# Patient Record
Sex: FEMALE | Race: WHITE | NOT HISPANIC OR LATINO | Employment: OTHER | URBAN - NONMETROPOLITAN AREA
[De-identification: names, ages, dates, MRNs, and addresses within clinical notes are randomized per-mention and may not be internally consistent; named-entity substitution may affect disease eponyms.]

---

## 2018-02-02 ENCOUNTER — DOCUMENTATION ONLY (OUTPATIENT)
Dept: FAMILY MEDICINE | Facility: OTHER | Age: 33
End: 2018-02-02

## 2018-02-02 RX ORDER — LORATADINE 10 MG/1
10 TABLET ORAL DAILY PRN
Status: ON HOLD | COMMUNITY
Start: 2014-10-14 | End: 2022-11-28

## 2018-02-02 RX ORDER — ALPRAZOLAM 0.5 MG
1 TABLET ORAL 2 TIMES DAILY PRN
COMMUNITY
Start: 2016-12-20 | End: 2018-04-06

## 2018-02-02 RX ORDER — BUPROPION HYDROCHLORIDE 150 MG/1
150 TABLET ORAL EVERY MORNING
COMMUNITY
Start: 2016-09-06 | End: 2018-04-06

## 2018-03-25 ENCOUNTER — HEALTH MAINTENANCE LETTER (OUTPATIENT)
Age: 33
End: 2018-03-25

## 2018-04-06 ENCOUNTER — OFFICE VISIT (OUTPATIENT)
Dept: FAMILY MEDICINE | Facility: OTHER | Age: 33
End: 2018-04-06
Attending: NURSE PRACTITIONER
Payer: COMMERCIAL

## 2018-04-06 VITALS
WEIGHT: 185.1 LBS | TEMPERATURE: 97.3 F | HEIGHT: 65 IN | BODY MASS INDEX: 30.84 KG/M2 | SYSTOLIC BLOOD PRESSURE: 112 MMHG | RESPIRATION RATE: 18 BRPM | DIASTOLIC BLOOD PRESSURE: 66 MMHG | HEART RATE: 70 BPM

## 2018-04-06 DIAGNOSIS — H60.393 INFECTIVE OTITIS EXTERNA, BILATERAL: Primary | ICD-10-CM

## 2018-04-06 PROCEDURE — G0463 HOSPITAL OUTPT CLINIC VISIT: HCPCS

## 2018-04-06 PROCEDURE — 99213 OFFICE O/P EST LOW 20 MIN: CPT | Performed by: NURSE PRACTITIONER

## 2018-04-06 RX ORDER — AZITHROMYCIN 250 MG/1
TABLET, FILM COATED ORAL
Qty: 6 TABLET | Refills: 0 | Status: SHIPPED | OUTPATIENT
Start: 2018-04-06 | End: 2019-06-09

## 2018-04-06 RX ORDER — OFLOXACIN 3 MG/ML
5 SOLUTION AURICULAR (OTIC) 2 TIMES DAILY
Qty: 4 ML | Refills: 0 | Status: SHIPPED | OUTPATIENT
Start: 2018-04-06 | End: 2018-04-13

## 2018-04-06 RX ORDER — DEXTROAMPHETAMINE SACCHARATE, AMPHETAMINE ASPARTATE, DEXTROAMPHETAMINE SULFATE AND AMPHETAMINE SULFATE 5; 5; 5; 5 MG/1; MG/1; MG/1; MG/1
TABLET ORAL
Refills: 0 | COMMUNITY
Start: 2018-03-11 | End: 2021-06-22 | Stop reason: DRUGHIGH

## 2018-04-06 ASSESSMENT — PAIN SCALES - GENERAL: PAINLEVEL: NO PAIN (0)

## 2018-04-06 NOTE — NURSING NOTE
Patient presents to the clinic for bilat ear pain. Started about 2 weeks ago and had greenish drainage from ears. Now, drainage is clear but patient states she feels like she is underwater. Has taken ibuprofen and hot packs for pain.   Katelyn Irizarry LPN............. April 6, 2018 12:23 PM

## 2018-04-06 NOTE — MR AVS SNAPSHOT
After Visit Summary   4/6/2018    Ilana Aguilar    MRN: [id number]           Patient Information     Date Of Birth          1985        Visit Information        Provider Department      4/6/2018 12:30 PM Meli Ye NP [facility]        Today's Diagnoses     Infective otitis externa, bilateral    -  1      Care Instructions      Otitis Media (Middle-Ear Infection) in Adults  Otitis media is another name for a middle-ear infection. It means an infection behind your eardrum. This kind of ear infection can happen after any condition that keeps fluid from draining from the middle ear. These conditions include allergies, a cold, a sore throat, or a respiratory infection.  Middle-ear infections are common in children, but they can also happen in adults. An ear infection in an adult may mean a more serious problem than in a child. So you may need additional tests. If you have an ear infection, you should see your health care provider for treatment.  What are the types of middle-ear infections?  Infections can affect the middle ear in several ways. They are:    Acute otitis media. This middle-ear infection occurs suddenly. It causes swelling and redness. Fluid and mucus become trapped inside the ear. You can have a fever and ear pain.    Otitis media with effusion. Fluid (effusion) and mucus build up in the middle ear after the infection goes away. You may feel like your middle ear is full. This can continue for months and may affect your hearing.    Chronic otitis media with effusion. Fluid (effusion) remains in the middle ear for a long time. Or it builds up again and again, even though there is no infection. This type of middle-ear infection may be hard to treat. It may also affect your hearing.  Who is more likely to get a middle-ear infection?  You are more likely to get an ear infection if you:    Smoke or are around someone who smokes    Have seasonal or year-round  allergy symptoms    Have a cold or other upper respiratory infection  What causes a middle-ear infection?  The middle ear connects to the throat by a canal called the eustachian tube. This tube helps even out the pressure between the outer ear and the inner ear. A cold or allergy can irritate the tube or cause the area around it to swell. This can keep fluid from draining from the middle ear. The fluid builds up behind the eardrum. Bacteria and viruses can grow in this fluid. The bacteria and viruses cause the middle-ear infection.  What are the symptoms of a middle-ear infection?  Common symptoms of a middle-ear infection in adults are:    Pain in 1 or both ears    Drainage from the ear    Muffled hearing    Sore throat   You may also have a fever. Rarely, your balance can be affected.  These symptoms may be the same as for other conditions. It s important to talk with your health care provider if you think you have a middle-ear infection. If you have a high fever, severe pain behind your ear, or paralysis in your face, see your provider as soon as you can.  How is a middle-ear infection diagnosed?  Your health care provider will take a medical history and do a physical exam. He or she will look at the outer ear and eardrum with an otoscope. The otoscope is a lighted tool that lets your provider see inside the ear. A pneumatic otoscope blows a puff of air into the ear to check how well your eardrum moves. If you eardrum doesn t move well, it may mean you have fluid behind it.  Your provider may also do a test called tympanometry. This test tells how well the middle ear is working. It can find any changes in pressure in the middle ear. Your provider may test your hearing with a tuning fork.  How is a middle-ear infection treated?  A middle-ear infection may be treated with:    Antibiotics, taken by mouth or as ear drops    Medication for pain    Decongestants, antihistamines, or nasal steroids  Your health care  provider may also have you try autoinsufflation. This helps adjust the air pressure in your ear. For this, you pinch your nose and gently exhale. This forces air back through the eustachian tube.  The exact treatment for your ear infection will depend on the type of infection you have. In general, if your symptoms don t get better in 48 to 72 hours, contact your health care provider.  Middle-ear infections can cause long-term problems if not treated. They can lead to:    Infection in other parts of the head    Permanent hearing loss    Paralysis of a nerve in your face  If you have a middle-ear infection that doesn t get better, you may need to see an ear, nose, and throat specialist (otolaryngologist). You may need a CT scan or MRI to check for head and neck cancer.  Ear tubes  Sometimes fluid stays in the middle ear even after you take antibiotics and the infection goes away. In this case, your health care provider may suggest that a small tube be placed in your ear. The tube is put at the opening of the eardrum. The tube keeps fluid from building up and relieves pressure in the middle ear. It can also help you hear better. This surgery is called myringotomy. It is not often done in adults.  The tubes usually fall out on their own after 6 months to a year.    4490-4158 [employer]. [address] [place] [address]. All rights reserved. This information is not intended as a substitute for professional medical care. Always follow your healthcare professional's instructions.                Follow-ups after your visit        Who to contact     If you have questions or need follow up information about today's clinic visit or your schedule please contact [facility] directly at [phone].  Normal or non-critical lab and imaging results will be communicated to you by MyChart, letter or phone within 4 business days after the clinic has received the results. If you do not hear  "from us within 7 days, please contact the clinic through "[employer] or phone. If you have a critical or abnormal lab result, we will notify you by phone as soon as possible.  Submit refill requests through "[employer] or call your pharmacy and they will forward the refill request to us. Please allow 3 business days for your refill to be completed.          Additional Information About Your Visit        [employer] Information     "[employer] lets you send messages to your doctor, view your test results, renew your prescriptions, schedule appointments and more. To sign up, go to [email or web address] . Click on \"Log in\" on the left side of the screen, which will take you to the Welcome page. Then click on \"Sign up Now\" on the right side of the page.     You will be asked to enter the access code listed below, as well as some personal information. Please follow the directions to create your username and password.     Your access code is: [email or web address]  Expires: 2018  1:07 PM     Your access code will  in 90 days. If you need help or a new code, please call your [place] clinic or [phone].        Care EveryWhere ID     This is your Care EveryWhere ID. This could be used by other organizations to access your [place] medical records  [id number]        Your Vitals Were     Pulse Temperature Respirations Height Last Period Breastfeeding?    70 97.3  F (36.3  C) (Tympanic) 18 5' 5\" (1.651 m) 2018 No    BMI (Body Mass Index)                   30.8 kg/m2            Blood Pressure from Last 3 Encounters:   18 112/66   16 110/80   16 118/78    Weight from Last 3 Encounters:   18 185 lb 1.6 oz (84 kg)   16 217 lb (98.4 kg)   16 215 lb (97.5 kg)              Today, you had the following     No orders found for display         Today's Medication Changes          These changes are accurate as of 18  1:07 PM.  If you have any questions, ask your nurse or doctor.               Start " taking these medicines.        Dose/Directions    azithromycin 250 MG tablet   Commonly known as:  ZITHROMAX   Used for:  Infective otitis externa, bilateral   Started by:  Meli Ye NP        Two tablets first day, then one tablet daily for four days.   Quantity:  6 tablet   Refills:  0       ofloxacin 0.3 % otic solution   Commonly known as:  FLOXIN   Used for:  Infective otitis externa, bilateral   Started by:  Meli Ye NP        Dose:  5 drop   Place 5 drops into both ears 2 times daily for 7 days   Quantity:  4 mL   Refills:  0            Where to get your medicines      These medications were sent to [id number] - [place], [place] - [address]  [address], [facility] [address]     Phone:  [phone]     azithromycin 250 MG tablet    ofloxacin 0.3 % otic solution                Primary Care Provider Office Phone # Fax #    Swati Miller [phone]       [address] [facility] [address]        Equal Access to Services     [facility]: Hadii aad ku hadasho Soomaali, waaxda luqadaha, qaybta kaalmada adeegyada, waxay idiin hayfredyn jamar ramirez . So [facility] [phone].    ATENCIÓN: Si habla español, tiene a huddleston disposición servicios gratuitos de asistencia lingüística. Llame al [phone].    We comply with applicable federal civil rights laws and [place] laws. We do not discriminate on the basis of race, color, national origin, age, disability, sex, sexual orientation, or gender identity.            Thank you!     Thank you for choosing [facility]  for your care. Our goal is always to provide you with excellent care. Hearing back from our patients is one way we can continue to improve our services. Please take a few minutes to complete the written survey that you may receive in the mail after your visit with us. Thank you!             Your Updated Medication List - Protect others  around you: Learn how to safely use, store and throw away your medicines at www.disposemymeds.org.          This list is accurate as of 4/6/18  1:07 PM.  Always use your most recent med list.                   Brand Name Dispense Instructions for use Diagnosis    amphetamine-dextroamphetamine 20 MG per tablet    ADDERALL     TK 1 T PO TID        azithromycin 250 MG tablet    ZITHROMAX    6 tablet    Two tablets first day, then one tablet daily for four days.    Infective otitis externa, bilateral       loratadine 10 MG tablet    CLARITIN     Take 10 mg by mouth daily        ofloxacin 0.3 % otic solution    FLOXIN    4 mL    Place 5 drops into both ears 2 times daily for 7 days    Infective otitis externa, bilateral

## 2018-04-06 NOTE — PATIENT INSTRUCTIONS
Otitis Media (Middle-Ear Infection) in Adults  Otitis media is another name for a middle-ear infection. It means an infection behind your eardrum. This kind of ear infection can happen after any condition that keeps fluid from draining from the middle ear. These conditions include allergies, a cold, a sore throat, or a respiratory infection.  Middle-ear infections are common in children, but they can also happen in adults. An ear infection in an adult may mean a more serious problem than in a child. So you may need additional tests. If you have an ear infection, you should see your health care provider for treatment.  What are the types of middle-ear infections?  Infections can affect the middle ear in several ways. They are:    Acute otitis media. This middle-ear infection occurs suddenly. It causes swelling and redness. Fluid and mucus become trapped inside the ear. You can have a fever and ear pain.    Otitis media with effusion. Fluid (effusion) and mucus build up in the middle ear after the infection goes away. You may feel like your middle ear is full. This can continue for months and may affect your hearing.    Chronic otitis media with effusion. Fluid (effusion) remains in the middle ear for a long time. Or it builds up again and again, even though there is no infection. This type of middle-ear infection may be hard to treat. It may also affect your hearing.  Who is more likely to get a middle-ear infection?  You are more likely to get an ear infection if you:    Smoke or are around someone who smokes    Have seasonal or year-round allergy symptoms    Have a cold or other upper respiratory infection  What causes a middle-ear infection?  The middle ear connects to the throat by a canal called the eustachian tube. This tube helps even out the pressure between the outer ear and the inner ear. A cold or allergy can irritate the tube or cause the area around it to swell. This can keep fluid from draining from  the middle ear. The fluid builds up behind the eardrum. Bacteria and viruses can grow in this fluid. The bacteria and viruses cause the middle-ear infection.  What are the symptoms of a middle-ear infection?  Common symptoms of a middle-ear infection in adults are:    Pain in 1 or both ears    Drainage from the ear    Muffled hearing    Sore throat   You may also have a fever. Rarely, your balance can be affected.  These symptoms may be the same as for other conditions. It s important to talk with your health care provider if you think you have a middle-ear infection. If you have a high fever, severe pain behind your ear, or paralysis in your face, see your provider as soon as you can.  How is a middle-ear infection diagnosed?  Your health care provider will take a medical history and do a physical exam. He or she will look at the outer ear and eardrum with an otoscope. The otoscope is a lighted tool that lets your provider see inside the ear. A pneumatic otoscope blows a puff of air into the ear to check how well your eardrum moves. If you eardrum doesn t move well, it may mean you have fluid behind it.  Your provider may also do a test called tympanometry. This test tells how well the middle ear is working. It can find any changes in pressure in the middle ear. Your provider may test your hearing with a tuning fork.  How is a middle-ear infection treated?  A middle-ear infection may be treated with:    Antibiotics, taken by mouth or as ear drops    Medication for pain    Decongestants, antihistamines, or nasal steroids  Your health care provider may also have you try autoinsufflation. This helps adjust the air pressure in your ear. For this, you pinch your nose and gently exhale. This forces air back through the eustachian tube.  The exact treatment for your ear infection will depend on the type of infection you have. In general, if your symptoms don t get better in 48 to 72 hours, contact your health care  provider.  Middle-ear infections can cause long-term problems if not treated. They can lead to:    Infection in other parts of the head    Permanent hearing loss    Paralysis of a nerve in your face  If you have a middle-ear infection that doesn t get better, you may need to see an ear, nose, and throat specialist (otolaryngologist). You may need a CT scan or MRI to check for head and neck cancer.  Ear tubes  Sometimes fluid stays in the middle ear even after you take antibiotics and the infection goes away. In this case, your health care provider may suggest that a small tube be placed in your ear. The tube is put at the opening of the eardrum. The tube keeps fluid from building up and relieves pressure in the middle ear. It can also help you hear better. This surgery is called myringotomy. It is not often done in adults.  The tubes usually fall out on their own after 6 months to a year.    9933-9310 [employer]. [address] [place] [address], [place] [address]. All rights reserved. This information is not intended as a substitute for professional medical care. Always follow your healthcare professional's instructions.

## 2018-04-06 NOTE — PROGRESS NOTES
"Nursing Notes:   Katelyn Irizarry, LPN  4/6/2018  1:05 PM  Signed  Patient presents to the clinic for bilat ear pain. Started about 2 weeks ago and had greenish drainage from ears. Now, drainage is clear but patient states she feels like she is underwater. Has taken ibuprofen and hot packs for pain.   Katelyn Irizarry LPN............. April 6, 2018 12:23 PM     SUBJECTIVE:   Ilana Aguilar is a 32 year old female who presents to clinic today for the following health issues:    Ear Concern:       Duration: 2 weeks    Description  ear pain bilateral, ear drainage, no fevers, chills, no cough, no Nasal congestion, no sore throat    Severity: severe    Accompanying signs and symptoms: None    History (predisposing factors):  history of recurrent otitis media    Precipitating or alleviating factors: None    Therapies tried and outcome:  rest and fluids        Problem list and histories reviewed & adjusted, as indicated.  Additional history: as documented    Current Outpatient Prescriptions   Medication Sig Dispense Refill     ofloxacin (FLOXIN) 0.3 % otic solution Place 5 drops into both ears 2 times daily for 7 days 4 mL 0     azithromycin (ZITHROMAX) 250 MG tablet Two tablets first day, then one tablet daily for four days. 6 tablet 0     loratadine (CLARITIN) 10 MG tablet Take 10 mg by mouth daily       amphetamine-dextroamphetamine (ADDERALL) 20 MG per tablet TK 1 T PO TID  0     No Known Allergies    Reviewed and updated as needed this visit by clinical staff  Tobacco  Allergies  Meds       Reviewed and updated as needed this visit by Provider         ROS:  A comprehensive 10 point ROS was obtained and documented for notable findings in the HPI.       OBJECTIVE:     /66 (BP Location: Left arm, Patient Position: Sitting, Cuff Size: Adult Regular)  Pulse 70  Temp 97.3  F (36.3  C) (Tympanic)  Resp 18  Ht 5' 5\" (1.651 m)  Wt 185 lb 1.6 oz (84 kg)  LMP 03/22/2018  Breastfeeding? No  BMI 30.8 " kg/m2  Body mass index is 30.8 kg/(m^2).  GENERAL: healthy, alert and no distress  EYES: Eyes grossly normal to inspection  HENT: normal cephalic/atraumatic, right ear: mucopurulent, Lime green in color effusion, left ear: mucopurulent, Lime green in color effusion, Ear canals not red, boggy, no d/c in the canals. Nose and mouth without ulcers or lesions, oropharynx clear, oral mucous membranes moist and sinuses: not tender  NECK: no adenopathy  RESP: With ease  SKIN: no suspicious lesions or rashes  PSYCH: mentation appears normal, affect normal/bright    Diagnostic Test Results:  none     ASSESSMENT/PLAN:     1. Infective otitis externa, bilateral  - ofloxacin (FLOXIN) 0.3 % otic solution; Place 5 drops into both ears 2 times daily for 7 days  Dispense: 4 mL; Refill: 0  - azithromycin (ZITHROMAX) 250 MG tablet; Two tablets first day, then one tablet daily for four days.  Dispense: 6 tablet; Refill: 0    Medical Decision Making:    Differential Diagnosis:  URI Adult/Peds:  Acute right otitis media and Acute left otitis media    Serious Comorbid Conditions:  Adult:  None    PLAN:    URI Adult:  Tylenol, Ibuprofen, Rest, OTC decongestant/antihistamine, Saline nasal spray and Vaporizer, Ear drops and oral abx. F/U with PCP as needed. Discussed ENT referral she declines at this time.     Followup:    If not improving or if condition worsens, follow up with your Primary Care Provider        Meli Ye NP, 4/6/2018 1:05 PM

## 2019-01-02 ENCOUNTER — OFFICE VISIT (OUTPATIENT)
Dept: FAMILY MEDICINE | Facility: OTHER | Age: 34
End: 2019-01-02
Payer: COMMERCIAL

## 2019-01-02 DIAGNOSIS — Z23 NEED FOR INFLUENZA VACCINATION: Primary | ICD-10-CM

## 2019-01-02 PROCEDURE — 90471 IMMUNIZATION ADMIN: CPT

## 2019-01-02 PROCEDURE — G0463 HOSPITAL OUTPT CLINIC VISIT: HCPCS

## 2019-01-02 PROCEDURE — 90686 IIV4 VACC NO PRSV 0.5 ML IM: CPT

## 2019-01-02 PROCEDURE — 96372 THER/PROPH/DIAG INJ SC/IM: CPT

## 2019-01-03 NOTE — NURSING NOTE
Patient presents to the clinic for influenza immunization.  Catarina POWELL CMA...1/2/2019 6:37 PM

## 2019-01-03 NOTE — PROGRESS NOTES
Patient presents to the clinic for influenza vaccination.  Catarina POWELL CMA...1/2/2019 6:39 PM

## 2019-06-09 ENCOUNTER — OFFICE VISIT (OUTPATIENT)
Dept: FAMILY MEDICINE | Facility: OTHER | Age: 34
End: 2019-06-09
Attending: NURSE PRACTITIONER
Payer: COMMERCIAL

## 2019-06-09 VITALS
DIASTOLIC BLOOD PRESSURE: 70 MMHG | TEMPERATURE: 98.1 F | SYSTOLIC BLOOD PRESSURE: 120 MMHG | BODY MASS INDEX: 32.4 KG/M2 | RESPIRATION RATE: 16 BRPM | OXYGEN SATURATION: 91 % | WEIGHT: 189.8 LBS | HEIGHT: 64 IN | HEART RATE: 88 BPM

## 2019-06-09 DIAGNOSIS — H69.93 DYSFUNCTION OF BOTH EUSTACHIAN TUBES: Primary | ICD-10-CM

## 2019-06-09 PROCEDURE — 99213 OFFICE O/P EST LOW 20 MIN: CPT | Performed by: NURSE PRACTITIONER

## 2019-06-09 PROCEDURE — G0463 HOSPITAL OUTPT CLINIC VISIT: HCPCS

## 2019-06-09 RX ORDER — PREDNISONE 20 MG/1
40 TABLET ORAL DAILY
Qty: 10 TABLET | Refills: 0 | Status: SHIPPED | OUTPATIENT
Start: 2019-06-09 | End: 2019-06-14

## 2019-06-09 ASSESSMENT — PAIN SCALES - GENERAL: PAINLEVEL: NO PAIN (0)

## 2019-06-09 ASSESSMENT — MIFFLIN-ST. JEOR: SCORE: 1560.93

## 2019-06-09 NOTE — PATIENT INSTRUCTIONS
Thank you for choosing [facility] and [facility] for your care.     You are advised to contact our office if there is no improvement or if there is worsening of conditions or symptoms, either come back or follow up with your primary care provider.     You were seen in the [facility]. This is for urgent care needs. If you have other questions or concerns please see your primary care provider.           Meli Ye RN, MSN, FNP  [facility]         Flonase or  Nasonex    [place] pot rinses    Claritin D for decongestant

## 2019-06-09 NOTE — NURSING NOTE
Chief Complaint   Patient presents with     Ear Problem       Medication Reconciliation: complete   Patient presents with bilateral ears feel plugged. Patient was on antibiotic 3 weeks ago and pain went away.  Carri Conrad LPN  ..................6/9/2019   2:01 PM

## 2019-06-09 NOTE — PROGRESS NOTES
Nursing Notes:   Carri Conrad LPN  2019  2:14 PM  Signed  Chief Complaint   Patient presents with     Ear Problem       Medication Reconciliation: complete   Patient presents with bilateral ears feel plugged. Patient was on antibiotic 3 weeks ago and pain went away.  Carri Conrad LPN  ..................2019   2:01 PM     SUBJECTIVE:   Ilana Aguilar is a 33 year old female who presents to clinic today for the following health issues:    Ear concerns:       Duration: 3 weeks    Description  ear pressure bilateral    Severity: moderate    Accompanying signs and symptoms: No fevers, chills, no cough, sob, wheezing, no nasal congestion.     History (predisposing factors):  Was treated for an ear infection 2 weeks ago, remains with ear pressure and sensation of hearing loss.     Precipitating or alleviating factors: None    Therapies tried and outcome:  rest and fluids oral decongestant antihistamine        Problem list and histories reviewed & adjusted, as indicated.  Additional history: as documented    Patient Active Problem List   Diagnosis     Cervical high risk human papillomavirus (HPV) DNA test positive     Generalized anxiety disorder     Panic disorder     Past Surgical History:   Procedure Laterality Date     OTHER SURGICAL HISTORY      [id number],COLPOSCOPY       Social History     Tobacco Use     Smoking status: Never Smoker     Smokeless tobacco: Never Used   Substance Use Topics     Alcohol use: No     Alcohol/week: 0.0 oz     Family History   Problem Relation Age of Onset     Other - See Comments Mother         with traumatic brain injury,     Other - See Comments Mother         Psychiatric illness,depression     Substance Abuse Mother         Alcohol/Drug,chemical dependency     Heart Disease Mother         Heart Disease,  of heart failure at age 40.     Unknown/Adopted Father         Unknown,Unknown to patient     Family History Negative Brother         Good Health      "Genetic Disorder No family hx of         Genetic,Denies any family history of cancer, MI or thyroid disorders.         Current Outpatient Medications   Medication Sig Dispense Refill     amphetamine-dextroamphetamine (ADDERALL) 20 MG per tablet TK 1 T PO TID  0     loratadine (CLARITIN) 10 MG tablet Take 10 mg by mouth daily       predniSONE (DELTASONE) 20 MG tablet Take 2 tablets (40 mg) by mouth daily for 5 days 10 tablet 0     No Known Allergies      ROS:  Notable findings in the HPI.       OBJECTIVE:     /70 (BP Location: Left arm, Patient Position: Sitting, Cuff Size: Adult Regular)   Pulse 88   Temp 98.1  F (36.7  C) (Tympanic)   Resp 16   Ht 1.626 m (5' 4\")   Wt 86.1 kg (189 lb 12.8 oz)   LMP 05/09/2019   SpO2 91%   Breastfeeding? No   BMI 32.58 kg/m    Body mass index is 32.58 kg/m .  GENERAL: healthy, alert and no distress  EYES: Eyes grossly normal to inspection  HENT: normal cephalic/atraumatic, right ear: clear effusion, left ear: clear effusion, nose and mouth without ulcers or lesions, oropharynx clear, oral mucous membranes moist and sinuses: not tender  NECK: no adenopathy  RESP: Without increased work of breathing  CV: regular rates and rhythm and no peripheral edema    Diagnostic Test Results:  none     ASSESSMENT/PLAN:     1. Dysfunction of both eustachian tubes  - predniSONE (DELTASONE) 20 MG tablet; Take 2 tablets (40 mg) by mouth daily for 5 days  Dispense: 10 tablet; Refill: 0      PLAN:    Ear adult:  OTC decongestant/antihistamine, Vaporizer and over-the-counter medications and home cares are discussed.  Follow-up if needed    Followup:    If not improving or if condition worsens, follow up with your Primary Care Provider    I explained my diagnostic considerations and recommendations to the patient, who voiced understanding and agreement with the treatment plan. All questions were answered. We discussed potential side effects of any prescribed or recommended therapies, as " well as expectations for response to treatments.  She was advised to contact our office if there is no improvement or worsening of conditions or symptoms.  If s/s worsen or persist, patient will either come back or follow up with PCP.    Disclaimer:  This note consists of words and symbols derived from keyboarding, dictation, or using voice recognition software. As a result, there may be errors in the script that have gone undetected. Please consider this when interpreting information found in this note.      Meli Ye NP, 6/9/2019 2:15 PM

## 2020-03-11 ENCOUNTER — HEALTH MAINTENANCE LETTER (OUTPATIENT)
Age: 35
End: 2020-03-11

## 2020-12-27 ENCOUNTER — HEALTH MAINTENANCE LETTER (OUTPATIENT)
Age: 35
End: 2020-12-27

## 2021-04-25 ENCOUNTER — HEALTH MAINTENANCE LETTER (OUTPATIENT)
Age: 36
End: 2021-04-25

## 2021-06-22 ENCOUNTER — OFFICE VISIT (OUTPATIENT)
Dept: FAMILY MEDICINE | Facility: OTHER | Age: 36
End: 2021-06-22
Payer: COMMERCIAL

## 2021-06-22 VITALS
HEART RATE: 92 BPM | DIASTOLIC BLOOD PRESSURE: 60 MMHG | SYSTOLIC BLOOD PRESSURE: 112 MMHG | RESPIRATION RATE: 20 BRPM | OXYGEN SATURATION: 99 % | TEMPERATURE: 99.2 F

## 2021-06-22 DIAGNOSIS — H60.393 INFECTIVE OTITIS EXTERNA, BILATERAL: Primary | ICD-10-CM

## 2021-06-22 PROCEDURE — 99213 OFFICE O/P EST LOW 20 MIN: CPT | Performed by: NURSE PRACTITIONER

## 2021-06-22 PROCEDURE — G0463 HOSPITAL OUTPT CLINIC VISIT: HCPCS

## 2021-06-22 RX ORDER — DEXTROAMPHETAMINE SACCHARATE, AMPHETAMINE ASPARTATE MONOHYDRATE, DEXTROAMPHETAMINE SULFATE AND AMPHETAMINE SULFATE 7.5; 7.5; 7.5; 7.5 MG/1; MG/1; MG/1; MG/1
30 CAPSULE, EXTENDED RELEASE ORAL EVERY MORNING
COMMUNITY
Start: 2021-05-27 | End: 2022-08-16

## 2021-06-22 RX ORDER — DEXTROAMPHETAMINE SACCHARATE, AMPHETAMINE ASPARTATE, DEXTROAMPHETAMINE SULFATE AND AMPHETAMINE SULFATE 7.5; 7.5; 7.5; 7.5 MG/1; MG/1; MG/1; MG/1
TABLET ORAL
COMMUNITY
Start: 2021-05-28 | End: 2022-08-16

## 2021-06-22 RX ORDER — OFLOXACIN 3 MG/ML
5 SOLUTION AURICULAR (OTIC) 2 TIMES DAILY
Qty: 4 ML | Refills: 0 | Status: SHIPPED | OUTPATIENT
Start: 2021-06-22 | End: 2021-06-29

## 2021-06-22 ASSESSMENT — PAIN SCALES - GENERAL: PAINLEVEL: MILD PAIN (3)

## 2021-06-22 NOTE — NURSING NOTE
Patient presenting with 1 week history of bilateral ear pain, chest heaviness, sinus pressure, and fever. She reports that her symptoms have gotten worse over the past week.  States that she did take Aleve 2 hours ago  Medication Reconciliation: complete    Mable Vidal LPN  6/22/2021 3:41 PM

## 2021-06-22 NOTE — PATIENT INSTRUCTIONS
Patient Education     External Ear Infection (Adult)    External otitis (also called  swimmer s ear ) is an infection in the ear canal. It's often caused by bacteria or fungus. It can occur a few days after water gets trapped in the ear canal (from swimming or bathing). It can also occur after cleaning too deeply in the ear canal with a cotton swab or other object. Sometimes, hair care products get into the ear canal and cause this problem.   Symptoms can include pain, fever, itching, redness, drainage, or swelling of the ear canal. Temporary hearing loss may also occur.   Home care    Don't try to clean the ear canal. This can push pus and bacteria deeper into the canal.    Use prescribed ear drops as directed. These help reduce swelling and fight the infection. If an ear wick was placed in the ear canal, apply drops right onto the end of the wick. The wick will draw the medicine into the ear canal even if it's swollen closed.    A cotton ball may be loosely placed in the outer ear to absorb any drainage.    You may use over-the-counter medicines to control pain as directed by the healthcare provider, unless another medicine was prescribed. Talk with your provider before using these medicines if you have chronic liver or kidney disease or ever had a stomach ulcer or digestive tract bleeding.    Don't allow water to get into your ear when bathing. Also don't swim until the infection has cleared.    Prevention    Keep your ears dry. This helps lower the risk of infection. Dry your ears with a towel or hair dryer after getting wet. Also, use ear plugs when swimming.    Don't stick any objects in the ear to remove wax.    Talk with your provider about using ear drops to prevent swimmer's ear in case you feel water trapped in your ear canal. You can get these drops over the counter at most drugstores. They work by removing water from the ear canal.    Follow-up care  Follow up with your healthcare provider in 1 week,  or as advised.   When to seek medical advice  Call your healthcare provider right away if any of these occur:     Ear pain becomes worse or doesn t improve after 3 days of treatment    Redness or swelling of the outer ear occurs or gets worse    Headache    Fever of 100.4 F (38 C) or higher, or as directed by your healthcare provider  Call 911  Call 911 or get immediate medical care if any of the following occur:     Seizure    Unusual drowsiness or confusion    Unusual painful or stiff neck    Aj last reviewed this educational content on 8/1/2020 2000-2021 [employer]. All rights reserved. This information is not intended as a substitute for professional medical care. Always follow your healthcare professional's instructions.

## 2021-06-22 NOTE — PROGRESS NOTES
ASSESSMENT/PLAN:  1. Infective otitis externa, bilateral    - ofloxacin (FLOXIN) 0.3 % otic solution; Place 5 drops into both ears 2 times daily for 7 days  Dispense: 4 mL; Refill: 0  - amoxicillin-clavulanate (AUGMENTIN) 875-125 MG tablet; Take 1 tablet by mouth 2 times daily for 10 days  Dispense: 20 tablet; Refill: 0    Discussed with the patient that based on her symptoms and physical exam findings she does appear to have otitis externa of bilateral auditory canals.  Patient states that when she has been treated for otitis externa previously she has been prescribed otic drops and an oral antibiotic.  Due to the fact that unable to visualize the bilateral TMs the patient was prescribed ofloxacin otic drops twice daily x7 days and Augmentin twice daily x10 days.    Avoid submerging the ears or getting water into the ears while showering, this can be avoided by placing cotton to the outer portion of the auditory canal while bathing or showering.     May use over-the-counter Tylenol or ibuprofen PRN    Discussed warning signs/symptoms indicative of need to f/u    Follow up if symptoms persist or worsen or concerns      I explained my diagnostic considerations and recommendations to the patient, who voiced understanding and agreement with the treatment plan. All questions were answered. We discussed potential side effects of any prescribed or recommended therapies, as well as expectations for response to treatments.        HPI:    Ilana Aguilar is a 35 year old female  who presents to [facility] today for ear pain bilaterally, nasal congestion, coughing up clear/white mucous, shortness of breath, chest tightness and sinus pressure. Symptoms started 1 week ago. Denies chest pain. Denies history of asthma or smoking. Temperature of 99.2 during today's visit. Fever of 100.5F, last night. States that she has taken aleve for her symptoms. Reports having increased fatigue. No known sick contacts. Reports a history of  otitis externa, states that this occurs almost every year around this time. Reports that she was swimming within the last couple of weeks.     Past Medical History:   Diagnosis Date     Acute vaginitis     History of bacterial vaginosis     Anogenital (venereal) warts     No Comments Provided     Encounter for insertion of intrauterine contraceptive device     14,Paragard Lot# [id number] Exp 2020     Injury of left ankle     Left ankle - denied per patient     Personal history of other medical treatment (CODE)     ,  ( one AB at age 20)     Personal history of urinary calculi     No Comments Provided     Personal history of urinary infection     No Comments Provided     Scoliosis     No Comments Provided     Social phobia     No Comments Provided     Past Surgical History:   Procedure Laterality Date     OTHER SURGICAL HISTORY      [id number],COLPOSCOPY     Social History     Tobacco Use     Smoking status: Never Smoker     Smokeless tobacco: Never Used   Substance Use Topics     Alcohol use: No     Alcohol/week: 0.0 standard drinks     Current Outpatient Medications   Medication Sig Dispense Refill     amphetamine-dextroamphetamine (ADDERALL XR) 30 MG 24 hr capsule Take 30 mg by mouth every morning       amphetamine-dextroamphetamine (ADDERALL) 30 MG tablet TAKE 1 TABLET BY MOUTH IN THE AFTERNOON       loratadine (CLARITIN) 10 MG tablet Take 10 mg by mouth daily       No Known Allergies      Past medical history, past surgical history, current medications and allergies reviewed and accurate to the best of my knowledge.        ROS:  Refer to HPI    /60   Pulse 92   Temp 99.2  F (37.3  C) (Tympanic)   Resp 20   LMP 2021   SpO2 99%   Breastfeeding No     EXAM:  General Appearance: Well appearing female, appropriate appearance for age. No acute distress  Ears: Left TM unable to visualize.  Right TM unable to visualize.  Left auditory canal erythema, swelling and green/yellow discharge  present.  Right auditory canal erythema, swelling and green/yellow discharge present.  Normal external ears, patient repots tenderness to bilateral external ears.  Orophayrnx: moist mucous membranes, posterior pharynx without erythema, tonsils without hypertrophy, no erythema, no exudates or petechiae, no post nasal drip seen, no trismus, voice clear.    Neck: supple without adenopathy  Respiratory: normal chest wall and respirations.  Normal effort.  Clear to auscultation bilaterally, no wheezing, crackles or rhonchi.  No increased work of breathing.  No cough appreciated.  Cardiac: RRR with no murmurs  Psychological: normal affect, alert, oriented, and pleasant.

## 2021-10-09 ENCOUNTER — HEALTH MAINTENANCE LETTER (OUTPATIENT)
Age: 36
End: 2021-10-09

## 2022-02-09 ENCOUNTER — OFFICE VISIT (OUTPATIENT)
Dept: FAMILY MEDICINE | Facility: OTHER | Age: 37
End: 2022-02-09
Attending: PHYSICIAN ASSISTANT
Payer: COMMERCIAL

## 2022-02-09 VITALS
SYSTOLIC BLOOD PRESSURE: 122 MMHG | TEMPERATURE: 98.3 F | OXYGEN SATURATION: 96 % | DIASTOLIC BLOOD PRESSURE: 83 MMHG | WEIGHT: 216.2 LBS | BODY MASS INDEX: 36.91 KG/M2 | RESPIRATION RATE: 18 BRPM | HEIGHT: 64 IN | HEART RATE: 110 BPM

## 2022-02-09 DIAGNOSIS — Z13.29 SCREENING FOR THYROID DISORDER: ICD-10-CM

## 2022-02-09 DIAGNOSIS — N63.15 BREAST LUMP ON RIGHT SIDE AT 12 O'CLOCK POSITION: ICD-10-CM

## 2022-02-09 DIAGNOSIS — B35.3 ATHLETE'S FOOT ON RIGHT: ICD-10-CM

## 2022-02-09 DIAGNOSIS — Z13.220 SCREENING CHOLESTEROL LEVEL: ICD-10-CM

## 2022-02-09 DIAGNOSIS — Z00.00 ROUTINE HISTORY AND PHYSICAL EXAMINATION OF ADULT: Primary | ICD-10-CM

## 2022-02-09 DIAGNOSIS — Z11.3 SCREEN FOR STD (SEXUALLY TRANSMITTED DISEASE): ICD-10-CM

## 2022-02-09 DIAGNOSIS — Z01.419 PAP TEST, AS PART OF ROUTINE GYNECOLOGICAL EXAMINATION: ICD-10-CM

## 2022-02-09 DIAGNOSIS — R59.0 ENLARGED LYMPH NODE IN NECK: ICD-10-CM

## 2022-02-09 LAB
ALBUMIN SERPL-MCNC: 4.7 G/DL (ref 3.5–5.7)
ALP SERPL-CCNC: 54 U/L (ref 34–104)
ALT SERPL W P-5'-P-CCNC: 19 U/L (ref 7–52)
ANION GAP SERPL CALCULATED.3IONS-SCNC: 11 MMOL/L (ref 3–14)
AST SERPL W P-5'-P-CCNC: 17 U/L (ref 13–39)
BASOPHILS # BLD AUTO: 0 10E3/UL (ref 0–0.2)
BASOPHILS NFR BLD AUTO: 0 %
BILIRUB SERPL-MCNC: 0.5 MG/DL (ref 0.3–1)
BUN SERPL-MCNC: 10 MG/DL (ref 7–25)
C TRACH DNA SPEC QL PROBE+SIG AMP: NEGATIVE
CALCIUM SERPL-MCNC: 9.6 MG/DL (ref 8.6–10.3)
CHLORIDE BLD-SCNC: 104 MMOL/L (ref 98–107)
CHOLEST SERPL-MCNC: 152 MG/DL
CLUE CELLS: PRESENT
CO2 SERPL-SCNC: 25 MMOL/L (ref 21–31)
CREAT SERPL-MCNC: 0.87 MG/DL (ref 0.6–1.2)
CRP SERPL-MCNC: 1.6 MG/L
EOSINOPHIL # BLD AUTO: 0 10E3/UL (ref 0–0.7)
EOSINOPHIL NFR BLD AUTO: 1 %
ERYTHROCYTE [DISTWIDTH] IN BLOOD BY AUTOMATED COUNT: 12.5 % (ref 10–15)
ERYTHROCYTE [SEDIMENTATION RATE] IN BLOOD BY WESTERGREN METHOD: 13 MM/HR (ref 0–20)
GFR SERPL CREATININE-BSD FRML MDRD: 89 ML/MIN/1.73M2
GLUCOSE BLD-MCNC: 96 MG/DL (ref 70–105)
HCT VFR BLD AUTO: 44.5 % (ref 35–47)
HDLC SERPL-MCNC: 49 MG/DL (ref 23–92)
HGB BLD-MCNC: 14.7 G/DL (ref 11.7–15.7)
IMM GRANULOCYTES # BLD: 0 10E3/UL
IMM GRANULOCYTES NFR BLD: 0 %
LDLC SERPL CALC-MCNC: 81 MG/DL
LYMPHOCYTES # BLD AUTO: 2 10E3/UL (ref 0.8–5.3)
LYMPHOCYTES NFR BLD AUTO: 22 %
MCH RBC QN AUTO: 29.6 PG (ref 26.5–33)
MCHC RBC AUTO-ENTMCNC: 33 G/DL (ref 31.5–36.5)
MCV RBC AUTO: 90 FL (ref 78–100)
MONOCYTES # BLD AUTO: 0.6 10E3/UL (ref 0–1.3)
MONOCYTES NFR BLD AUTO: 7 %
N GONORRHOEA DNA SPEC QL NAA+PROBE: NEGATIVE
NEUTROPHILS # BLD AUTO: 6.2 10E3/UL (ref 1.6–8.3)
NEUTROPHILS NFR BLD AUTO: 70 %
NONHDLC SERPL-MCNC: 103 MG/DL
NRBC # BLD AUTO: 0 10E3/UL
NRBC BLD AUTO-RTO: 0 /100
PLATELET # BLD AUTO: 388 10E3/UL (ref 150–450)
POTASSIUM BLD-SCNC: 3.7 MMOL/L (ref 3.5–5.1)
PROT SERPL-MCNC: 7.3 G/DL (ref 6.4–8.9)
RBC # BLD AUTO: 4.97 10E6/UL (ref 3.8–5.2)
SODIUM SERPL-SCNC: 140 MMOL/L (ref 134–144)
TRICHOMONAS, WET PREP: ABNORMAL
TRIGL SERPL-MCNC: 109 MG/DL
TSH SERPL DL<=0.005 MIU/L-ACNC: 1.99 MU/L (ref 0.4–4)
WBC # BLD AUTO: 8.9 10E3/UL (ref 4–11)
WBC'S/HIGH POWER FIELD, WET PREP: ABNORMAL
YEAST, WET PREP: ABNORMAL

## 2022-02-09 PROCEDURE — 87624 HPV HI-RISK TYP POOLED RSLT: CPT | Mod: ZL | Performed by: PHYSICIAN ASSISTANT

## 2022-02-09 PROCEDURE — 80053 COMPREHEN METABOLIC PANEL: CPT | Mod: ZL | Performed by: PHYSICIAN ASSISTANT

## 2022-02-09 PROCEDURE — 87591 N.GONORRHOEAE DNA AMP PROB: CPT | Mod: ZL | Performed by: PHYSICIAN ASSISTANT

## 2022-02-09 PROCEDURE — 87389 HIV-1 AG W/HIV-1&-2 AB AG IA: CPT | Mod: ZL | Performed by: PHYSICIAN ASSISTANT

## 2022-02-09 PROCEDURE — 86803 HEPATITIS C AB TEST: CPT | Mod: ZL | Performed by: PHYSICIAN ASSISTANT

## 2022-02-09 PROCEDURE — G0123 SCREEN CERV/VAG THIN LAYER: HCPCS | Performed by: PHYSICIAN ASSISTANT

## 2022-02-09 PROCEDURE — 86140 C-REACTIVE PROTEIN: CPT | Mod: ZL | Performed by: PHYSICIAN ASSISTANT

## 2022-02-09 PROCEDURE — 84443 ASSAY THYROID STIM HORMONE: CPT | Mod: ZL | Performed by: PHYSICIAN ASSISTANT

## 2022-02-09 PROCEDURE — 85025 COMPLETE CBC W/AUTO DIFF WBC: CPT | Mod: ZL | Performed by: PHYSICIAN ASSISTANT

## 2022-02-09 PROCEDURE — 87210 SMEAR WET MOUNT SALINE/INK: CPT | Mod: ZL | Performed by: PHYSICIAN ASSISTANT

## 2022-02-09 PROCEDURE — 85652 RBC SED RATE AUTOMATED: CPT | Mod: ZL | Performed by: PHYSICIAN ASSISTANT

## 2022-02-09 PROCEDURE — 36415 COLL VENOUS BLD VENIPUNCTURE: CPT | Mod: ZL | Performed by: PHYSICIAN ASSISTANT

## 2022-02-09 PROCEDURE — 87491 CHLMYD TRACH DNA AMP PROBE: CPT | Mod: ZL | Performed by: PHYSICIAN ASSISTANT

## 2022-02-09 PROCEDURE — 86706 HEP B SURFACE ANTIBODY: CPT | Mod: ZL | Performed by: PHYSICIAN ASSISTANT

## 2022-02-09 PROCEDURE — 87340 HEPATITIS B SURFACE AG IA: CPT | Mod: ZL | Performed by: PHYSICIAN ASSISTANT

## 2022-02-09 PROCEDURE — 99395 PREV VISIT EST AGE 18-39: CPT | Performed by: PHYSICIAN ASSISTANT

## 2022-02-09 PROCEDURE — 86780 TREPONEMA PALLIDUM: CPT | Mod: ZL | Performed by: PHYSICIAN ASSISTANT

## 2022-02-09 PROCEDURE — 80061 LIPID PANEL: CPT | Mod: ZL | Performed by: PHYSICIAN ASSISTANT

## 2022-02-09 RX ORDER — CLOTRIMAZOLE 1 %
CREAM (GRAM) TOPICAL 2 TIMES DAILY
Qty: 30 G | Refills: 3 | Status: SHIPPED | OUTPATIENT
Start: 2022-02-09 | End: 2022-08-16

## 2022-02-09 RX ORDER — FLUTICASONE PROPIONATE 50 MCG
SPRAY, SUSPENSION (ML) NASAL
COMMUNITY
Start: 2021-11-08 | End: 2022-08-16

## 2022-02-09 RX ORDER — DEXTROAMPHETAMINE SACCHARATE, AMPHETAMINE ASPARTATE, DEXTROAMPHETAMINE SULFATE AND AMPHETAMINE SULFATE 5; 5; 5; 5 MG/1; MG/1; MG/1; MG/1
20 TABLET ORAL 3 TIMES DAILY
Status: ON HOLD | COMMUNITY
Start: 2022-01-11 | End: 2022-08-22

## 2022-02-09 ASSESSMENT — ANXIETY QUESTIONNAIRES
GAD7 TOTAL SCORE: 7
1. FEELING NERVOUS, ANXIOUS, OR ON EDGE: SEVERAL DAYS
3. WORRYING TOO MUCH ABOUT DIFFERENT THINGS: SEVERAL DAYS
5. BEING SO RESTLESS THAT IT IS HARD TO SIT STILL: NOT AT ALL
6. BECOMING EASILY ANNOYED OR IRRITABLE: SEVERAL DAYS
7. FEELING AFRAID AS IF SOMETHING AWFUL MIGHT HAPPEN: SEVERAL DAYS
GAD7 TOTAL SCORE: 7
2. NOT BEING ABLE TO STOP OR CONTROL WORRYING: MORE THAN HALF THE DAYS
7. FEELING AFRAID AS IF SOMETHING AWFUL MIGHT HAPPEN: SEVERAL DAYS
4. TROUBLE RELAXING: SEVERAL DAYS
GAD7 TOTAL SCORE: 7

## 2022-02-09 ASSESSMENT — PATIENT HEALTH QUESTIONNAIRE - PHQ9
10. IF YOU CHECKED OFF ANY PROBLEMS, HOW DIFFICULT HAVE THESE PROBLEMS MADE IT FOR YOU TO DO YOUR WORK, TAKE CARE OF THINGS AT HOME, OR GET ALONG WITH OTHER PEOPLE: SOMEWHAT DIFFICULT
SUM OF ALL RESPONSES TO PHQ QUESTIONS 1-9: 8
SUM OF ALL RESPONSES TO PHQ QUESTIONS 1-9: 8

## 2022-02-09 ASSESSMENT — PAIN SCALES - GENERAL: PAINLEVEL: NO PAIN (0)

## 2022-02-09 ASSESSMENT — MIFFLIN-ST. JEOR: SCORE: 1657.74

## 2022-02-09 NOTE — NURSING NOTE
Pt presents to clinic today for a physical, pap smear, labs and medication check.   Patient states she just moved back from [place] and is requesting routine lab work.   FOOD SECURITY SCREENING QUESTIONS:    The next two questions are to help us understand your food security.  If you are feeling you need any assistance in this area, we have resources available to support you today.    Hunger Vital Signs:  Within the past 12 months we worried whether our food would run out before we got money to buy more. Never  Within the past 12 months the food we bought just didn't last and we didn't have money to get more. Never    Medication Reconciliation: complete  Sae Cano, DAPHNE,LPN on 2/9/2022 at 3:29 PM

## 2022-02-09 NOTE — PATIENT INSTRUCTIONS
"Healthy Strategies  1. Eat at least 3 meals a day and never skip breakfast.  2. Eat more slowly.  3. Decrease portion size.  4. Provide structure by using meal replacement bars or shakes, and/or low calorie frozen meals.  5. For good nutrition incorporate fruit, vegetables, whole grains, lean protein, and low-fat dairy.  6. Remove trigger foods from yourenvironment to avoid impulse eating.  7. Increase physical activity: get a pedometer and aim for 10,000 steps a day or 30-35 minutes of activity 5 days per week.  8. Weigh yourself daily or at least weekly.  9. Keep a record of what you eat and your activity.  10. Establish a support system such as afriend, group or program.    11. Read Obie Andres's \"Eat to Live\". Remember it is important to have a minimum of 1200 calories a day, okay to use olive oil, 40 grams of fiber daily. No more than two servings (the size of your palm) of red meat a week.     Please consider the following general health recommendations:    Eat a quality diet (generally, low in simple sugars, starches, cholesterol and saturated fat.)    Please get 1200 mg of calcium in divided doses with 800 units vitamin D in your diet daily. Take supplements as needed to obtain full recommended amounts.     Stay physically active. Regular walking or other exercise is one of the best ways to minimize pain of arthritis; maintain independence and mobility; maintain bone strength; maintain conditioning of your heart. Find something you enjoy and a friend to do it with you.    Maintain ideal weight. Your Body mass index is There is no height or weight on file to calculate BMI.. Generally a BMI of 20-25 is considered ideal. Overweight is defined as 25-30, obese is 30-35 and markedly obese is greater than 35.    Apply sun block (SPF 25 or greater) on exposed skin anytime you are out in the sun to prevent skin cancer.     Wear a seatbelt whenever you are in a car.    Obtain a flu shot every fall.    You should have " a tetanus booster at least once every 10 years.    Check blood sugar annually. Cholesterol annually unless you have had a normal level when last checked within 5 years.     I recommend that you have a general physical exam every year. You should have a pap test every 3 years between the ages of 21 and 30 and every 3-5 years between the ages of 30 and 65 depending on your test unless you have had previous abnormal pap smears, (in these cases the exams and PAP's should be done on a schedule as recommended by your primary care provider). If you have had hysterectomy in the past, your future Pap plan may be different.

## 2022-02-09 NOTE — PROGRESS NOTES
Nursing Notes:   Kindra Quevedo LPN  2/9/2022  3:32 PM  Signed  Pt presents to clinic today for a physical, pap smear, labs and medication check.   Patient states she just moved back from [place] and is requesting routine lab work.   FOOD SECURITY SCREENING QUESTIONS:    The next two questions are to help us understand your food security.  If you are feeling you need any assistance in this area, we have resources available to support you today.    Hunger Vital Signs:  Within the past 12 months we worried whether our food would run out before we got money to buy more. Never  Within the past 12 months the food we bought just didn't last and we didn't have money to get more. Never    Medication Reconciliation: complete  Sae Cano LPN,LPN on 2/9/2022 at 3:29 PM       ANNUAL PHYSICAL - FEMALE    HPI: Ilana Aguilar who presents for a yearly exam.  Concerns include: Patient is interested in having STD testing completed.  Her previous ex-boyfriend had hepatitis C.  Last exposure was approximately 6 months ago.  Interested in getting screened.    Patient has been struggling with swollen lymph nodes in her neck since last June 2021.  She has been seen several times for the issue.  To [place] recently and just came back.  Has been on several antibiotics for ear infections and throat concerns that they attributed the lymph nodes to.  Now she feels like she constantly has to clear her throat.  Feels like her lymph nodes are swelling underneath her chin.  Recently had a neck CT 1 month ago that was negative.  ENT recommended having labs completed.  Patient has occasional night sweats.  Patient has noticed possible athletes feet or some other rash on her right foot.  In between her toes.  Itchy at times.    Patient's last menstrual period was 02/02/2022 (exact date).   Contraception: have an IUD - placed 6/2017 - copper IUD  Risk for STI?: no concerns  Last pap: 8/6/2014 - normal pap needs to be repeated  Any  hx of abnormal paps:  none  FH of early CA?: none  Cholesterol/DM concerns/screening: none  Tobacco?: no  Calcium intake: drink almond milk, MTV  DEXA: na  Last mammo: None, no concerns  Colonoscopy: na  HIV/Hepatitis C screening: would like these checked   Immunizations: COVID-19, flu vaccine - declines    Answers for HPI/ROS submitted by the patient on 2022  If you checked off any problems, how difficult have these problems made it for you to do your work, take care of things at home, or get along with other people?: Somewhat difficult  PHQ9 TOTAL SCORE: 8  YASMANI 7 TOTAL SCORE: 7  Frequency of exercise:: None  Getting at least 3 servings of Calcium per day:: Yes  Diet:: Regular (no restrictions)  Taking medications regularly:: Yes  Medication side effects:: None  Bi-annual eye exam:: Yes  Dental care twice a year:: Yes  Sleep apnea or symptoms of sleep apnea:: None  Additional concerns today:: Yes        Patient Active Problem List    Diagnosis Date Noted     Generalized anxiety disorder 10/15/2014     Priority: Medium     Overview:   zoloft and effexor ineffective        Panic disorder 2010     Priority: Medium     Cervical high risk human papillomavirus (HPV) DNA test positive 2010     Priority: Medium       Past Medical History:   Diagnosis Date     Acute vaginitis     History of bacterial vaginosis     Anogenital (venereal) warts     No Comments Provided     Encounter for insertion of intrauterine contraceptive device     14,Paragard Lot# [id number] Exp 2020     Injury of left ankle     Left ankle - denied per patient     Personal history of other medical treatment (CODE)     ,  ( one AB at age 20)     Personal history of urinary calculi     No Comments Provided     Personal history of urinary infection     No Comments Provided     Scoliosis     No Comments Provided     Social phobia     No Comments Provided       Past Surgical History:   Procedure Laterality Date     OTHER SURGICAL  "HISTORY      [id number],COLPOSCOPY       Family History   Problem Relation Age of Onset     Other - See Comments Mother         with traumatic brain injury,/Psychiatric illness,depression     Substance Abuse Mother         Alcohol/Drug,chemical dependency     Heart Disease Mother         Heart Disease,  of heart failure at age 40.     Unknown/Adopted Father         Unknown,Unknown to patient     Family History Negative Brother         Good Health     Genetic Disorder No family hx of         Genetic,Denies any family history of cancer, MI or thyroid disorders.       Social History     Tobacco Use     Smoking status: Never Smoker     Smokeless tobacco: Never Used   Substance Use Topics     Alcohol use: No     Alcohol/week: 0.0 standard drinks     Comment: social       Current Outpatient Medications   Medication Sig Dispense Refill     amphetamine-dextroamphetamine (ADDERALL) 20 MG tablet        clotrimazole (LOTRIMIN) 1 % external cream Apply topically 2 times daily 30 g 3     loratadine (CLARITIN) 10 MG tablet Take 10 mg by mouth daily       amphetamine-dextroamphetamine (ADDERALL XR) 30 MG 24 hr capsule Take 30 mg by mouth every morning (Patient not taking: Reported on 2022)       amphetamine-dextroamphetamine (ADDERALL) 30 MG tablet TAKE 1 TABLET BY MOUTH IN THE AFTERNOON (Patient not taking: Reported on 2022)       fluticasone (FLONASE) 50 MCG/ACT nasal spray  (Patient not taking: Reported on 2022)       metroNIDAZOLE (FLAGYL) 500 MG tablet Take 1 tablet (500 mg) by mouth 2 times daily for 7 days Do not drink alcohol with the medication! 14 tablet 0       No Known Allergies    REVIEW OF SYSTEMS:  Refer to HPI.    PHYSICAL EXAM:  /83 (BP Location: Right arm, Patient Position: Sitting, Cuff Size: Adult Regular)   Pulse 110   Temp 98.3  F (36.8  C) (Tympanic)   Resp 18   Ht 1.613 m (5' 3.5\")   Wt 98.1 kg (216 lb 3.2 oz)   LMP 2022 (Exact Date)   SpO2 96%   Breastfeeding No   BMI " 37.70 kg/m    CONSTITUTIONAL:  Alert,cooperative, NAD.  EYES: No scleral icterus.  PERRLA.  Conjunctiva clear.  ENT/MOUTH: External ears and nose normal.  TMs normal.  Moist mucous membranes. Oropharynx clear.    ENDO: No thyromegaly or thyroidnodules.  LYMPH:  No cervical or supraclavicular LA.    BREASTS: No skin abnormalities, no erythema.  No discrete masses.  No nipple discharge, no axillary, supra- or infraclavicular LA.   Right breast lump and pain at 12:00 approx 3 cm away from nipple, approx 1x1 cm in diameter.   CARDIOVASCULAR: Regular,S1, S2.  No S3 or S4.  No murmur/gallop/rub.  No peripheral edema.  RESPIRATORY: CTA bilaterally, no wheezes, rhonchi or rales.  GI: Bowel sounds wnl.  Soft, nontender, nondistended.  No masses or HSM.  No rebound or guarding.  : Vulva: normal, no lesions or discharge  Urethral meatus: normal size and location, no lesions or discharge  Urethra: no tenderness or masses  Bladder: no fullness or tenderness  Vagina: normal appearance, no abnormal discharge, no lesions.  No evidence of cystocele or rectocele.  Cervix: normal appearance, no lesions, no abnormal discharge, no cervical motion tenderness.  IUD strings appreciated  Uterus: normal size and position, mobile, non-tender  Adnexa: no palpable masses bilaterally. No cervical motion tenderness.  Pap smear obtained: yes  MSKEL: Grossly normal ROM.  No clubbing.  INTEGUMENTARY:  Warm, dry.  No rash noted on exposed skin.  NEUROLOGIC: Facies symmetric.  Grossly normal movement and tone.  No tremor.  PSYCHIATRIC: Affect normal.  Speech fluent.      PHQ Depression Screen  PHQ-9 SCORE 8/6/2014 4/6/2016 2/9/2022   PHQ-9 Total Score MyChart - - 8 (Mild depression)   PHQ-9 Total Score 4 15 8       Labs:  Results for orders placed or performed in visit on 02/09/22   HIV Antigen Antibody Combo     Status: Normal   Result Value Ref Range    HIV Antigen Antibody Combo Nonreactive Nonreactive   Treponema Ab w Reflex to RPR and Titer      Status: Normal   Result Value Ref Range    Treponema Antibody Total Nonreactive Nonreactive   Hepatitis C Screen Reflex to HCV RNA Quant and Genotype     Status: Normal   Result Value Ref Range    Hepatitis C Antibody Nonreactive Nonreactive    Narrative    Assay performance characteristics have not been established for newborns, infants, and children.   Hepatitis B Surface Antibody     Status: Normal   Result Value Ref Range    Hepatitis B Surface Antibody 84.75 >=12.00 m[IU]/mL   Hepatitis B Surface Antigen     Status: Normal   Result Value Ref Range    Hepatitis B Surface Antigen Nonreactive Nonreactive   Comprehensive Metabolic Panel     Status: Normal   Result Value Ref Range    Sodium 140 134 - 144 mmol/L    Potassium 3.7 3.5 - 5.1 mmol/L    Chloride 104 98 - 107 mmol/L    Carbon Dioxide (CO2) 25 21 - 31 mmol/L    Anion Gap 11 3 - 14 mmol/L    Urea Nitrogen 10 7 - 25 mg/dL    Creatinine 0.87 0.60 - 1.20 mg/dL    Calcium 9.6 8.6 - 10.3 mg/dL    Glucose 96 70 - 105 mg/dL    Alkaline Phosphatase 54 34 - 104 U/L    AST 17 13 - 39 U/L    ALT 19 7 - 52 U/L    Protein Total 7.3 6.4 - 8.9 g/dL    Albumin 4.7 3.5 - 5.7 g/dL    Bilirubin Total 0.5 0.3 - 1.0 mg/dL    GFR Estimate 89 >60 mL/min/1.73m2   Sedimentation Rate (ESR)     Status: Normal   Result Value Ref Range    Erythrocyte Sedimentation Rate 13 0 - 20 mm/hr   CRP inflammation     Status: Normal   Result Value Ref Range    CRP Inflammation 1.6 <10.0 mg/L   TSH Reflex GH     Status: Normal   Result Value Ref Range    TSH 1.99 0.40 - 4.00 mU/L   CBC with platelets and differential     Status: None   Result Value Ref Range    WBC Count 8.9 4.0 - 11.0 10e3/uL    RBC Count 4.97 3.80 - 5.20 10e6/uL    Hemoglobin 14.7 11.7 - 15.7 g/dL    Hematocrit 44.5 35.0 - 47.0 %    MCV 90 78 - 100 fL    MCH 29.6 26.5 - 33.0 pg    MCHC 33.0 31.5 - 36.5 g/dL    RDW 12.5 10.0 - 15.0 %    Platelet Count 388 150 - 450 10e3/uL    % Neutrophils 70 %    % Lymphocytes 22 %    % Monocytes  7 %    % Eosinophils 1 %    % Basophils 0 %    % Immature Granulocytes 0 %    NRBCs per 100 WBC 0 <1 /100    Absolute Neutrophils 6.2 1.6 - 8.3 10e3/uL    Absolute Lymphocytes 2.0 0.8 - 5.3 10e3/uL    Absolute Monocytes 0.6 0.0 - 1.3 10e3/uL    Absolute Eosinophils 0.0 0.0 - 0.7 10e3/uL    Absolute Basophils 0.0 0.0 - 0.2 10e3/uL    Absolute Immature Granulocytes 0.0 <=0.4 10e3/uL    Absolute NRBCs 0.0 10e3/uL   Lipid Panel     Status: Normal   Result Value Ref Range    Cholesterol 152 <200 mg/dL    Triglycerides 109 <150 mg/dL    Direct Measure HDL 49 23 - 92 mg/dL    LDL Cholesterol Calculated 81 <=100 mg/dL    Non HDL Cholesterol 103 <130 mg/dL    Narrative    Cholesterol  Desirable:  <200 mg/dL    Triglycerides  Normal:  Less than 150 mg/dL  Borderline High:  150-199 mg/dL  High:  200-499 mg/dL  Very High:  Greater than or equal to 500 mg/dL    Direct Measure HDL  Female:  Greater than or equal to 50 mg/dL   Male:  Greater than or equal to 40 mg/dL    LDL Cholesterol  Desirable:  <100mg/dL  Above Desirable:  100-129 mg/dL   Borderline High:  130-159 mg/dL   High:  160-189 mg/dL   Very High:  >= 190 mg/dL    Non HDL Cholesterol  Desirable:  130 mg/dL  Above Desirable:  130-159 mg/dL  Borderline High:  160-189 mg/dL  High:  190-219 mg/dL  Very High:  Greater than or equal to 220 mg/dL   GC/Chlamydia by PCR     Status: Normal    Specimen: Vagina; Swab   Result Value Ref Range    Chlamydia Trachomatis Negative Negative    Neisseria gonorrhoeae Negative Negative    Narrative    Assay performed using [employer] real-time, reverse-transcriptase PCR.   Wet Prep, Genital     Status: Abnormal    Specimen: Vagina; Swab   Result Value Ref Range    Trichomonas Absent Absent    Yeast Absent Absent    Clue Cells Present (A) Absent    WBCs/high power field 2+ (A) None   CBC and Differential     Status: None    Narrative    The following orders were created for panel order CBC and Differential.  Procedure                                Abnormality         Status                     ---------                               -----------         ------                     CBC with platelets and d...[[id number]]                      Final result                 Please view results for these tests on the individual orders.       ASSESSMENT AND PLAN:      ICD-10-CM    1. Routine history and physical examination of adult  Z00.00    2. Pap test, as part of routine gynecological examination  Z01.419 Pap Screen with HPV - recommended age 30 - 65 years     HPV High Risk Types DNA Cervical   3. Screen for STD (sexually transmitted disease)  Z11.3 HIV Antigen Antibody Combo     Treponema Ab w Reflex to RPR and Titer     Hepatitis C Screen Reflex to HCV RNA Quant and Genotype     Hepatitis B Surface Antibody     Hepatitis B Surface Antigen     GC/Chlamydia by PCR     Wet Prep, Genital     HIV Antigen Antibody Combo     Treponema Ab w Reflex to RPR and Titer     Hepatitis C Screen Reflex to HCV RNA Quant and Genotype     Hepatitis B Surface Antibody     Hepatitis B Surface Antigen   4. Enlarged lymph node in neck  R59.0 CBC and Differential     Comprehensive Metabolic Panel     Sedimentation Rate (ESR)     CRP inflammation     CBC and Differential     Comprehensive Metabolic Panel     Sedimentation Rate (ESR)     CRP inflammation   5. Screening for thyroid disorder  Z13.29 TSH Reflex GH     TSH Reflex GH   6. Breast lump on right side at 12 o'clock position  N63.15 MA Diagnostic Bilateral w/Martin     US Breast Right Limited 1-3 Quadrants   7. Athlete's foot on right  B35.3 clotrimazole (LOTRIMIN) 1 % external cream   8. Screening cholesterol level  Z13.220 Lipid Panel         Completed Pap and HPV for cervical cancer screening.  Results are pending.    STD screen: Complete HIV, syphilis, hepatitis B and C, gonorrhea, chlamydia, and vaginal wet prep to rule out infection concerns.  Also completed lipid profile for cholesterol screening.  Completed TSH for thyroid  "screening.  With lymph node concerns in the neck also completed ESR, CRP, CBC, CMP.  Discussed her recent CT imaging.  Patient states that she will send me the results of her neck CT for review to discuss further steps.  No ear or throat infection concerns are appreciated at this time.    Patient was positive for clue cells which indicates bacterial vaginosis.  Bacterial vaginosis - Given metronidazole for treatment.  Do NOT drink alcohol while taking the medication.  Return in 1-2 weeks with persistent symptoms after treatment as needed.    Ordered a mammogram and breast ultrasound to rule out concerns with a lump in the right breast.  Encourage close follow-up.    Athletes feet: Started on clotrimazole cream.  Encouraged close follow-up if symptoms are not improving or worsening.  Encouraged to keep feet clean and dry.  Recheck as needed.    Relevant cancer screening discussed.    Counseled on healthy diet, Calcium and vitamin D intake, and exercise.    Patient Instructions   Healthy Strategies  1. Eat at least 3 meals a day and never skip breakfast.  2. Eat more slowly.  3. Decrease portion size.  4. Provide structure by using meal replacement bars or shakes, and/or low calorie frozen meals.  5. For good nutrition incorporate fruit, vegetables, whole grains, lean protein, and low-fat dairy.  6. Remove trigger foods from yourenvironment to avoid impulse eating.  7. Increase physical activity: get a pedometer and aim for 10,000 steps a day or 30-35 minutes of activity 5 days per week.  8. Weigh yourself daily or at least weekly.  9. Keep a record of what you eat and your activity.  10. Establish a support system such as afriend, group or program.    11. Read Obie Andres's \"Eat to Live\". Remember it is important to have a minimum of 1200 calories a day, okay to use olive oil, 40 grams of fiber daily. No more than two servings (the size of your palm) of red meat a week.     Please consider the following general " health recommendations:    Eat a quality diet (generally, low in simple sugars, starches, cholesterol and saturated fat.)    Please get 1200 mg of calcium in divided doses with 800 units vitamin D in your diet daily. Take supplements as needed to obtain full recommended amounts.     Stay physically active. Regular walking or other exercise is one of the best ways to minimize pain of arthritis; maintain independence and mobility; maintain bone strength; maintain conditioning of your heart. Find something you enjoy and a friend to do it with you.    Maintain ideal weight. Your Body mass index is There is no height or weight on file to calculate BMI.. Generally a BMI of 20-25 is considered ideal. Overweight is defined as 25-30, obese is 30-35 and markedly obese is greater than 35.    Apply sun block (SPF 25 or greater) on exposed skin anytime you are out in the sun to prevent skin cancer.     Wear a seatbelt whenever you are in a car.    Obtain a flu shot every fall.    You should have a tetanus booster at least once every 10 years.    Check blood sugar annually. Cholesterol annually unless you have had a normal level when last checked within 5 years.     I recommend that you have a general physical exam every year. You should have a pap test every 3 years between the ages of 21 and 30 and every 3-5 years between the ages of 30 and 65 depending on your test unless you have had previous abnormal pap smears, (in these cases the exams and PAP's should be done on a schedule as recommended by your primary care provider). If you have had hysterectomy in the past, your future Pap plan may be different.            Savannah Alcaraz PA-C ..................2/9/2022 3:03 PM

## 2022-02-10 ENCOUNTER — TELEPHONE (OUTPATIENT)
Dept: FAMILY MEDICINE | Facility: OTHER | Age: 37
End: 2022-02-10
Payer: COMMERCIAL

## 2022-02-10 DIAGNOSIS — N76.0 BV (BACTERIAL VAGINOSIS): Primary | ICD-10-CM

## 2022-02-10 DIAGNOSIS — B96.89 BV (BACTERIAL VAGINOSIS): Primary | ICD-10-CM

## 2022-02-10 LAB
HBV SURFACE AB SERPL IA-ACNC: 84.75 M[IU]/ML
HBV SURFACE AG SERPL QL IA: NONREACTIVE
HCV AB SERPL QL IA: NONREACTIVE
HIV 1+2 AB+HIV1 P24 AG SERPL QL IA: NONREACTIVE

## 2022-02-10 RX ORDER — METRONIDAZOLE 500 MG/1
500 TABLET ORAL 2 TIMES DAILY
Qty: 14 TABLET | Refills: 0 | Status: SHIPPED | OUTPATIENT
Start: 2022-02-10 | End: 2022-02-17

## 2022-02-10 ASSESSMENT — ANXIETY QUESTIONNAIRES: GAD7 TOTAL SCORE: 7

## 2022-02-10 NOTE — TELEPHONE ENCOUNTER
Your white blood cell count, hemoglobin, electrolytes, kidney function, liver function, blood sugar, ESR and CRP which are general inflammatory markers, thyroid, cholesterol, gonorrhea and Chlamydia STD test are normal.    Your vaginal wet prep was positive for clue cells.  This indicates that you have bacterial vaginosis.  This is an overgrowth of bacteria in the vagina.  This is not an STD.  I sent metronidazole to the pharmacy for treatment.  Please do not drink alcohol with this medication and for 48 hours afterwards.  Savannah Alcaraz PA-C.......... 2/10/2022 11:42 AM

## 2022-02-10 NOTE — TELEPHONE ENCOUNTER
Called, no answer, no voicemail set up, will try again later.  Sae Cano, LPN on 2/10/2022 at 1:01 PM

## 2022-02-11 LAB — T PALLIDUM AB SER QL: NONREACTIVE

## 2022-02-14 ENCOUNTER — HOSPITAL ENCOUNTER (OUTPATIENT)
Dept: ULTRASOUND IMAGING | Facility: OTHER | Age: 37
End: 2022-02-14
Attending: PHYSICIAN ASSISTANT
Payer: COMMERCIAL

## 2022-02-14 ENCOUNTER — HOSPITAL ENCOUNTER (OUTPATIENT)
Dept: MAMMOGRAPHY | Facility: OTHER | Age: 37
End: 2022-02-14
Attending: PHYSICIAN ASSISTANT
Payer: COMMERCIAL

## 2022-02-14 DIAGNOSIS — N63.15 BREAST LUMP ON RIGHT SIDE AT 12 O'CLOCK POSITION: ICD-10-CM

## 2022-02-14 PROCEDURE — 77066 DX MAMMO INCL CAD BI: CPT

## 2022-02-14 PROCEDURE — 76642 ULTRASOUND BREAST LIMITED: CPT | Mod: RT

## 2022-02-16 LAB
BKR LAB AP GYN ADEQUACY: NORMAL
BKR LAB AP GYN INTERPRETATION: NORMAL
BKR LAB AP HPV REFLEX: NORMAL
BKR LAB AP LMP: NORMAL
BKR LAB AP PREVIOUS ABNORMAL: NORMAL
PATH REPORT.COMMENTS IMP SPEC: NORMAL
PATH REPORT.COMMENTS IMP SPEC: NORMAL
PATH REPORT.RELEVANT HX SPEC: NORMAL

## 2022-02-17 LAB
HUMAN PAPILLOMA VIRUS 16 DNA: POSITIVE
HUMAN PAPILLOMA VIRUS 18 DNA: NEGATIVE
HUMAN PAPILLOMA VIRUS FINAL DIAGNOSIS: ABNORMAL
HUMAN PAPILLOMA VIRUS OTHER HR: NEGATIVE

## 2022-02-18 ENCOUNTER — TELEPHONE (OUTPATIENT)
Dept: FAMILY MEDICINE | Facility: OTHER | Age: 37
End: 2022-02-18
Payer: COMMERCIAL

## 2022-02-18 DIAGNOSIS — R87.810 CERVICAL HIGH RISK HPV (HUMAN PAPILLOMAVIRUS) TEST POSITIVE: Primary | ICD-10-CM

## 2022-02-18 NOTE — TELEPHONE ENCOUNTER
Pap test was normal however her HPV #16 test came back positive.  This is high risk HPV that can cause cervical concerns.  I would recommend having a colposcopy completed which is biopsy of your cervix to rule out concerns.  I have placed a referral to OB/GYN for consult.  I would recommend taking 800 mg of ibuprofen 1 hour prior to the procedure to help with discomfort.  Please let me know if you have any questions or concerns.  Savannah Alcaraz PA-C.......... 2/18/2022 1:04 PM

## 2022-07-06 ENCOUNTER — TRANSFERRED RECORDS (OUTPATIENT)
Dept: HEALTH INFORMATION MANAGEMENT | Facility: CLINIC | Age: 37
End: 2022-07-06

## 2022-08-09 ENCOUNTER — TRANSFERRED RECORDS (OUTPATIENT)
Dept: HEALTH INFORMATION MANAGEMENT | Facility: CLINIC | Age: 37
End: 2022-08-09

## 2022-08-15 ENCOUNTER — ANESTHESIA (OUTPATIENT)
Dept: EMERGENCY MEDICINE | Facility: OTHER | Age: 37
End: 2022-08-15
Payer: COMMERCIAL

## 2022-08-15 ENCOUNTER — ANESTHESIA EVENT (OUTPATIENT)
Dept: EMERGENCY MEDICINE | Facility: OTHER | Age: 37
End: 2022-08-15
Payer: COMMERCIAL

## 2022-08-15 ENCOUNTER — HOSPITAL ENCOUNTER (EMERGENCY)
Facility: OTHER | Age: 37
Discharge: PSYCHIATRIC OR SUBSTANCE USE FACILITY | End: 2022-08-16
Attending: STUDENT IN AN ORGANIZED HEALTH CARE EDUCATION/TRAINING PROGRAM | Admitting: STUDENT IN AN ORGANIZED HEALTH CARE EDUCATION/TRAINING PROGRAM
Payer: COMMERCIAL

## 2022-08-15 ENCOUNTER — APPOINTMENT (OUTPATIENT)
Dept: CT IMAGING | Facility: OTHER | Age: 37
End: 2022-08-15
Attending: STUDENT IN AN ORGANIZED HEALTH CARE EDUCATION/TRAINING PROGRAM
Payer: COMMERCIAL

## 2022-08-15 DIAGNOSIS — T50.902A INTENTIONAL DRUG OVERDOSE, INITIAL ENCOUNTER (H): ICD-10-CM

## 2022-08-15 DIAGNOSIS — R45.851 SUICIDAL IDEATION: ICD-10-CM

## 2022-08-15 DIAGNOSIS — F15.10 METHAMPHETAMINE ABUSE (H): ICD-10-CM

## 2022-08-15 LAB
ALBUMIN SERPL-MCNC: 3.9 G/DL (ref 3.5–5.7)
ALP SERPL-CCNC: 47 U/L (ref 34–104)
ALT SERPL W P-5'-P-CCNC: 15 U/L (ref 7–52)
AMMONIA PLAS-SCNC: 45 UMOL/L (ref 16–53)
AMPHETAMINES UR QL: ABNORMAL
ANION GAP SERPL CALCULATED.3IONS-SCNC: 9 MMOL/L (ref 3–14)
AST SERPL W P-5'-P-CCNC: 15 U/L (ref 13–39)
BARBITURATES UR QL SCN: NOT DETECTED
BASE EXCESS BLDV CALC-SCNC: 0.8 MMOL/L (ref -7.7–1.9)
BASOPHILS # BLD AUTO: 0.1 10E3/UL (ref 0–0.2)
BASOPHILS NFR BLD AUTO: 1 %
BENZODIAZ UR QL SCN: NOT DETECTED
BILIRUB SERPL-MCNC: 0.4 MG/DL (ref 0.3–1)
BUN SERPL-MCNC: 8 MG/DL (ref 7–25)
BUPRENORPHINE UR QL: NOT DETECTED
CALCIUM SERPL-MCNC: 8.8 MG/DL (ref 8.6–10.3)
CANNABINOIDS UR QL: NOT DETECTED
CHLORIDE BLD-SCNC: 103 MMOL/L (ref 98–107)
CO2 SERPL-SCNC: 26 MMOL/L (ref 21–31)
COCAINE UR QL SCN: NOT DETECTED
CREAT SERPL-MCNC: 0.94 MG/DL (ref 0.6–1.2)
D-METHAMPHET UR QL: ABNORMAL
EOSINOPHIL # BLD AUTO: 0.1 10E3/UL (ref 0–0.7)
EOSINOPHIL NFR BLD AUTO: 1 %
ERYTHROCYTE [DISTWIDTH] IN BLOOD BY AUTOMATED COUNT: 13.2 % (ref 10–15)
GFR SERPL CREATININE-BSD FRML MDRD: 81 ML/MIN/1.73M2
GLUCOSE BLD-MCNC: 114 MG/DL (ref 70–105)
HCG UR QL: NEGATIVE
HCO3 BLDV-SCNC: 26 MMOL/L (ref 21–28)
HCT VFR BLD AUTO: 40.1 % (ref 35–47)
HGB BLD-MCNC: 13.5 G/DL (ref 11.7–15.7)
IMM GRANULOCYTES # BLD: 0 10E3/UL
IMM GRANULOCYTES NFR BLD: 0 %
LACTATE SERPL-SCNC: 0.8 MMOL/L (ref 0.7–2)
LYMPHOCYTES # BLD AUTO: 1.7 10E3/UL (ref 0.8–5.3)
LYMPHOCYTES NFR BLD AUTO: 22 %
MCH RBC QN AUTO: 29.3 PG (ref 26.5–33)
MCHC RBC AUTO-ENTMCNC: 33.7 G/DL (ref 31.5–36.5)
MCV RBC AUTO: 87 FL (ref 78–100)
METHADONE UR QL SCN: NOT DETECTED
MONOCYTES # BLD AUTO: 0.7 10E3/UL (ref 0–1.3)
MONOCYTES NFR BLD AUTO: 9 %
NEUTROPHILS # BLD AUTO: 5.1 10E3/UL (ref 1.6–8.3)
NEUTROPHILS NFR BLD AUTO: 67 %
NRBC # BLD AUTO: 0 10E3/UL
NRBC BLD AUTO-RTO: 0 /100
O2/TOTAL GAS SETTING VFR VENT: 28 %
OPIATES UR QL SCN: NOT DETECTED
OXYCODONE UR QL SCN: NOT DETECTED
OXYHGB MFR BLDV: 94 % (ref 70–75)
PCO2 BLDV: 42 MM HG (ref 40–50)
PCP UR QL SCN: NOT DETECTED
PH BLDV: 7.4 [PH] (ref 7.32–7.43)
PLATELET # BLD AUTO: 357 10E3/UL (ref 150–450)
PO2 BLDV: 119 MM HG (ref 25–47)
POTASSIUM BLD-SCNC: 3.5 MMOL/L (ref 3.5–5.1)
PROPOXYPH UR QL: NOT DETECTED
PROT SERPL-MCNC: 6.7 G/DL (ref 6.4–8.9)
RBC # BLD AUTO: 4.6 10E6/UL (ref 3.8–5.2)
SODIUM SERPL-SCNC: 138 MMOL/L (ref 134–144)
TRICYCLICS UR QL SCN: NOT DETECTED
WBC # BLD AUTO: 7.6 10E3/UL (ref 4–11)

## 2022-08-15 PROCEDURE — 62270 DX LMBR SPI PNXR: CPT | Performed by: NURSE ANESTHETIST, CERTIFIED REGISTERED

## 2022-08-15 PROCEDURE — 82077 ASSAY SPEC XCP UR&BREATH IA: CPT | Performed by: FAMILY MEDICINE

## 2022-08-15 PROCEDURE — 93005 ELECTROCARDIOGRAM TRACING: CPT | Mod: XU | Performed by: STUDENT IN AN ORGANIZED HEALTH CARE EDUCATION/TRAINING PROGRAM

## 2022-08-15 PROCEDURE — 83605 ASSAY OF LACTIC ACID: CPT | Performed by: STUDENT IN AN ORGANIZED HEALTH CARE EDUCATION/TRAINING PROGRAM

## 2022-08-15 PROCEDURE — 70496 CT ANGIOGRAPHY HEAD: CPT | Mod: TC

## 2022-08-15 PROCEDURE — 70450 CT HEAD/BRAIN W/O DYE: CPT | Mod: TC,XU

## 2022-08-15 PROCEDURE — 87040 BLOOD CULTURE FOR BACTERIA: CPT | Mod: 91 | Performed by: STUDENT IN AN ORGANIZED HEALTH CARE EDUCATION/TRAINING PROGRAM

## 2022-08-15 PROCEDURE — 80306 DRUG TEST PRSMV INSTRMNT: CPT | Performed by: STUDENT IN AN ORGANIZED HEALTH CARE EDUCATION/TRAINING PROGRAM

## 2022-08-15 PROCEDURE — 81025 URINE PREGNANCY TEST: CPT | Performed by: STUDENT IN AN ORGANIZED HEALTH CARE EDUCATION/TRAINING PROGRAM

## 2022-08-15 PROCEDURE — 96375 TX/PRO/DX INJ NEW DRUG ADDON: CPT | Mod: XU | Performed by: STUDENT IN AN ORGANIZED HEALTH CARE EDUCATION/TRAINING PROGRAM

## 2022-08-15 PROCEDURE — 96368 THER/DIAG CONCURRENT INF: CPT | Mod: XU | Performed by: STUDENT IN AN ORGANIZED HEALTH CARE EDUCATION/TRAINING PROGRAM

## 2022-08-15 PROCEDURE — 250N000011 HC RX IP 250 OP 636: Performed by: STUDENT IN AN ORGANIZED HEALTH CARE EDUCATION/TRAINING PROGRAM

## 2022-08-15 PROCEDURE — 96365 THER/PROPH/DIAG IV INF INIT: CPT | Performed by: STUDENT IN AN ORGANIZED HEALTH CARE EDUCATION/TRAINING PROGRAM

## 2022-08-15 PROCEDURE — 96366 THER/PROPH/DIAG IV INF ADDON: CPT | Mod: XU | Performed by: STUDENT IN AN ORGANIZED HEALTH CARE EDUCATION/TRAINING PROGRAM

## 2022-08-15 PROCEDURE — 99285 EMERGENCY DEPT VISIT HI MDM: CPT | Performed by: STUDENT IN AN ORGANIZED HEALTH CARE EDUCATION/TRAINING PROGRAM

## 2022-08-15 PROCEDURE — 99285 EMERGENCY DEPT VISIT HI MDM: CPT | Mod: 25 | Performed by: STUDENT IN AN ORGANIZED HEALTH CARE EDUCATION/TRAINING PROGRAM

## 2022-08-15 PROCEDURE — 82140 ASSAY OF AMMONIA: CPT | Performed by: STUDENT IN AN ORGANIZED HEALTH CARE EDUCATION/TRAINING PROGRAM

## 2022-08-15 PROCEDURE — 36415 COLL VENOUS BLD VENIPUNCTURE: CPT | Performed by: STUDENT IN AN ORGANIZED HEALTH CARE EDUCATION/TRAINING PROGRAM

## 2022-08-15 PROCEDURE — 80053 COMPREHEN METABOLIC PANEL: CPT | Performed by: STUDENT IN AN ORGANIZED HEALTH CARE EDUCATION/TRAINING PROGRAM

## 2022-08-15 PROCEDURE — 93010 ELECTROCARDIOGRAM REPORT: CPT | Performed by: INTERNAL MEDICINE

## 2022-08-15 PROCEDURE — 250N000009 HC RX 250: Performed by: NURSE ANESTHETIST, CERTIFIED REGISTERED

## 2022-08-15 PROCEDURE — 62270 DX LMBR SPI PNXR: CPT | Performed by: STUDENT IN AN ORGANIZED HEALTH CARE EDUCATION/TRAINING PROGRAM

## 2022-08-15 PROCEDURE — 82805 BLOOD GASES W/O2 SATURATION: CPT | Performed by: STUDENT IN AN ORGANIZED HEALTH CARE EDUCATION/TRAINING PROGRAM

## 2022-08-15 PROCEDURE — C9803 HOPD COVID-19 SPEC COLLECT: HCPCS | Performed by: STUDENT IN AN ORGANIZED HEALTH CARE EDUCATION/TRAINING PROGRAM

## 2022-08-15 PROCEDURE — 85025 COMPLETE CBC W/AUTO DIFF WBC: CPT | Performed by: STUDENT IN AN ORGANIZED HEALTH CARE EDUCATION/TRAINING PROGRAM

## 2022-08-15 RX ORDER — NALOXONE HYDROCHLORIDE 0.4 MG/ML
0.4 INJECTION, SOLUTION INTRAMUSCULAR; INTRAVENOUS; SUBCUTANEOUS ONCE
Status: COMPLETED | OUTPATIENT
Start: 2022-08-15 | End: 2022-08-15

## 2022-08-15 RX ORDER — IOPAMIDOL 755 MG/ML
100 INJECTION, SOLUTION INTRAVASCULAR ONCE
Status: COMPLETED | OUTPATIENT
Start: 2022-08-15 | End: 2022-08-16

## 2022-08-15 RX ADMIN — LIDOCAINE HYDROCHLORIDE 5 ML: 10 INJECTION, SOLUTION INFILTRATION; PERINEURAL at 23:56

## 2022-08-15 RX ADMIN — NALOXONE HYDROCHLORIDE 0.4 MG: 0.4 INJECTION, SOLUTION INTRAMUSCULAR; INTRAVENOUS; SUBCUTANEOUS at 21:05

## 2022-08-15 ASSESSMENT — ACTIVITIES OF DAILY LIVING (ADL)
ADLS_ACUITY_SCORE: 33
ADLS_ACUITY_SCORE: 35

## 2022-08-16 ENCOUNTER — TELEPHONE (OUTPATIENT)
Dept: BEHAVIORAL HEALTH | Facility: CLINIC | Age: 37
End: 2022-08-16

## 2022-08-16 ENCOUNTER — HOSPITAL ENCOUNTER (INPATIENT)
Facility: HOSPITAL | Age: 37
LOS: 6 days | Discharge: HOME | End: 2022-08-22
Attending: STUDENT IN AN ORGANIZED HEALTH CARE EDUCATION/TRAINING PROGRAM | Admitting: STUDENT IN AN ORGANIZED HEALTH CARE EDUCATION/TRAINING PROGRAM
Payer: COMMERCIAL

## 2022-08-16 VITALS
TEMPERATURE: 96.5 F | BODY MASS INDEX: 37.49 KG/M2 | HEART RATE: 89 BPM | WEIGHT: 215 LBS | OXYGEN SATURATION: 95 % | RESPIRATION RATE: 13 BRPM | DIASTOLIC BLOOD PRESSURE: 66 MMHG | SYSTOLIC BLOOD PRESSURE: 99 MMHG

## 2022-08-16 DIAGNOSIS — F33.1 MODERATE EPISODE OF RECURRENT MAJOR DEPRESSIVE DISORDER (H): ICD-10-CM

## 2022-08-16 DIAGNOSIS — F41.1 GENERALIZED ANXIETY DISORDER: Primary | ICD-10-CM

## 2022-08-16 PROBLEM — R45.851 SUICIDAL IDEATION: Status: ACTIVE | Noted: 2022-08-16

## 2022-08-16 LAB
APPEARANCE CSF: CLEAR
ATRIAL RATE - MUSE: 88 BPM
C GATTII+NEOFOR DNA CSF QL NAA+NON-PROBE: NEGATIVE
CMV DNA CSF QL NAA+NON-PROBE: NEGATIVE
COLOR CSF: COLORLESS
DIASTOLIC BLOOD PRESSURE - MUSE: NORMAL MMHG
E COLI K1 AG CSF QL: NEGATIVE
ETHANOL SERPL-MCNC: <0.01 G/DL
EV RNA SPEC QL NAA+PROBE: NEGATIVE
FLUAV RNA SPEC QL NAA+PROBE: NEGATIVE
FLUBV RNA RESP QL NAA+PROBE: NEGATIVE
GLUCOSE CSF-MCNC: 69 MG/DL (ref 40–80)
GP B STREP DNA CSF QL NAA+NON-PROBE: NEGATIVE
GRAM STAIN RESULT: NORMAL
GRAM STAIN RESULT: NORMAL
HAEM INFLU DNA CSF QL NAA+NON-PROBE: NEGATIVE
HHV6 DNA CSF QL NAA+NON-PROBE: NEGATIVE
HSV1 DNA CSF QL NAA+NON-PROBE: NEGATIVE
HSV1 DNA CSF QL NAA+PROBE: NOT DETECTED
HSV2 DNA CSF QL NAA+NON-PROBE: NEGATIVE
HSV2 DNA CSF QL NAA+PROBE: NOT DETECTED
INTERPRETATION ECG - MUSE: NORMAL
L MONOCYTOG DNA CSF QL NAA+NON-PROBE: NEGATIVE
N MEN DNA CSF QL NAA+NON-PROBE: NEGATIVE
P AXIS - MUSE: 38 DEGREES
PARECHOVIRUS A RNA CSF QL NAA+NON-PROBE: NEGATIVE
PR INTERVAL - MUSE: 140 MS
PROT CSF-MCNC: 44 MG/DL (ref 15–45)
QRS DURATION - MUSE: 78 MS
QT - MUSE: 388 MS
QTC - MUSE: 469 MS
R AXIS - MUSE: 3 DEGREES
RBC # CSF MANUAL: 1 /UL (ref 0–2)
RSV RNA SPEC NAA+PROBE: NEGATIVE
S PNEUM DNA CSF QL NAA+NON-PROBE: NEGATIVE
SARS-COV-2 RNA RESP QL NAA+PROBE: NEGATIVE
SYSTOLIC BLOOD PRESSURE - MUSE: NORMAL MMHG
T AXIS - MUSE: 55 DEGREES
TUBE # CSF: 4
VENTRICULAR RATE- MUSE: 88 BPM
VZV DNA CSF QL NAA+NON-PROBE: NEGATIVE
WBC # CSF MANUAL: 2 /UL (ref 0–5)

## 2022-08-16 PROCEDURE — 250N000011 HC RX IP 250 OP 636: Performed by: STUDENT IN AN ORGANIZED HEALTH CARE EDUCATION/TRAINING PROGRAM

## 2022-08-16 PROCEDURE — 82945 GLUCOSE OTHER FLUID: CPT | Performed by: STUDENT IN AN ORGANIZED HEALTH CARE EDUCATION/TRAINING PROGRAM

## 2022-08-16 PROCEDURE — 124N000001 HC R&B MH

## 2022-08-16 PROCEDURE — 99223 1ST HOSP IP/OBS HIGH 75: CPT | Mod: AI | Performed by: NURSE PRACTITIONER

## 2022-08-16 PROCEDURE — 87205 SMEAR GRAM STAIN: CPT | Performed by: STUDENT IN AN ORGANIZED HEALTH CARE EDUCATION/TRAINING PROGRAM

## 2022-08-16 PROCEDURE — 99221 1ST HOSP IP/OBS SF/LOW 40: CPT | Performed by: NURSE PRACTITIONER

## 2022-08-16 PROCEDURE — 89050 BODY FLUID CELL COUNT: CPT | Performed by: STUDENT IN AN ORGANIZED HEALTH CARE EDUCATION/TRAINING PROGRAM

## 2022-08-16 PROCEDURE — 250N000013 HC RX MED GY IP 250 OP 250 PS 637: Performed by: STUDENT IN AN ORGANIZED HEALTH CARE EDUCATION/TRAINING PROGRAM

## 2022-08-16 PROCEDURE — 87637 SARSCOV2&INF A&B&RSV AMP PRB: CPT | Performed by: FAMILY MEDICINE

## 2022-08-16 PROCEDURE — 258N000003 HC RX IP 258 OP 636: Performed by: STUDENT IN AN ORGANIZED HEALTH CARE EDUCATION/TRAINING PROGRAM

## 2022-08-16 PROCEDURE — 84157 ASSAY OF PROTEIN OTHER: CPT | Performed by: STUDENT IN AN ORGANIZED HEALTH CARE EDUCATION/TRAINING PROGRAM

## 2022-08-16 PROCEDURE — 250N000013 HC RX MED GY IP 250 OP 250 PS 637: Performed by: NURSE PRACTITIONER

## 2022-08-16 PROCEDURE — 87529 HSV DNA AMP PROBE: CPT | Performed by: STUDENT IN AN ORGANIZED HEALTH CARE EDUCATION/TRAINING PROGRAM

## 2022-08-16 PROCEDURE — 87483 CNS DNA AMP PROBE TYPE 12-25: CPT | Performed by: STUDENT IN AN ORGANIZED HEALTH CARE EDUCATION/TRAINING PROGRAM

## 2022-08-16 PROCEDURE — 87070 CULTURE OTHR SPECIMN AEROBIC: CPT | Performed by: STUDENT IN AN ORGANIZED HEALTH CARE EDUCATION/TRAINING PROGRAM

## 2022-08-16 RX ORDER — CALCIUM CARBONATE 500 MG/1
4 TABLET, CHEWABLE ORAL 4 TIMES DAILY PRN
Status: ON HOLD | COMMUNITY
End: 2022-11-28

## 2022-08-16 RX ORDER — CALCIUM CARBONATE 500 MG/1
2000 TABLET, CHEWABLE ORAL 4 TIMES DAILY PRN
Status: DISCONTINUED | OUTPATIENT
Start: 2022-08-16 | End: 2022-08-22 | Stop reason: HOSPADM

## 2022-08-16 RX ORDER — MAGNESIUM HYDROXIDE/ALUMINUM HYDROXICE/SIMETHICONE 120; 1200; 1200 MG/30ML; MG/30ML; MG/30ML
30 SUSPENSION ORAL EVERY 4 HOURS PRN
Status: DISCONTINUED | OUTPATIENT
Start: 2022-08-16 | End: 2022-08-22 | Stop reason: HOSPADM

## 2022-08-16 RX ORDER — OLANZAPINE 10 MG/2ML
10 INJECTION, POWDER, FOR SOLUTION INTRAMUSCULAR 3 TIMES DAILY PRN
Status: DISCONTINUED | OUTPATIENT
Start: 2022-08-16 | End: 2022-08-22 | Stop reason: HOSPADM

## 2022-08-16 RX ORDER — CEFAZOLIN SODIUM 1 G/50ML
2000 SOLUTION INTRAVENOUS ONCE
Status: COMPLETED | OUTPATIENT
Start: 2022-08-16 | End: 2022-08-16

## 2022-08-16 RX ORDER — LORAZEPAM 1 MG/1
1 TABLET ORAL ONCE
Status: COMPLETED | OUTPATIENT
Start: 2022-08-16 | End: 2022-08-16

## 2022-08-16 RX ORDER — LORATADINE 10 MG/1
10 TABLET ORAL DAILY
Status: DISCONTINUED | OUTPATIENT
Start: 2022-08-16 | End: 2022-08-22 | Stop reason: HOSPADM

## 2022-08-16 RX ORDER — OLANZAPINE 2.5 MG/1
5 TABLET, FILM COATED ORAL ONCE
Status: COMPLETED | OUTPATIENT
Start: 2022-08-16 | End: 2022-08-16

## 2022-08-16 RX ORDER — ESCITALOPRAM OXALATE 10 MG/1
20 TABLET ORAL DAILY
Status: DISCONTINUED | OUTPATIENT
Start: 2022-08-16 | End: 2022-08-22 | Stop reason: HOSPADM

## 2022-08-16 RX ORDER — ACETAMINOPHEN 325 MG/1
650 TABLET ORAL EVERY 4 HOURS PRN
Status: DISCONTINUED | OUTPATIENT
Start: 2022-08-16 | End: 2022-08-22 | Stop reason: HOSPADM

## 2022-08-16 RX ORDER — OLANZAPINE 10 MG/1
10 TABLET ORAL 3 TIMES DAILY PRN
Status: DISCONTINUED | OUTPATIENT
Start: 2022-08-16 | End: 2022-08-22 | Stop reason: HOSPADM

## 2022-08-16 RX ORDER — LANOLIN ALCOHOL/MO/W.PET/CERES
3 CREAM (GRAM) TOPICAL
Status: DISCONTINUED | OUTPATIENT
Start: 2022-08-16 | End: 2022-08-22 | Stop reason: HOSPADM

## 2022-08-16 RX ORDER — ALPRAZOLAM 0.5 MG
.25-.5 TABLET ORAL
Status: ON HOLD | COMMUNITY
End: 2022-08-22

## 2022-08-16 RX ORDER — AMOXICILLIN 250 MG
1 CAPSULE ORAL 2 TIMES DAILY PRN
Status: DISCONTINUED | OUTPATIENT
Start: 2022-08-16 | End: 2022-08-22 | Stop reason: HOSPADM

## 2022-08-16 RX ORDER — CEFTRIAXONE SODIUM 2 G/50ML
2 INJECTION, SOLUTION INTRAVENOUS ONCE
Status: COMPLETED | OUTPATIENT
Start: 2022-08-16 | End: 2022-08-16

## 2022-08-16 RX ORDER — ZOLPIDEM TARTRATE 5 MG/1
5 TABLET ORAL AT BEDTIME
Status: ON HOLD | COMMUNITY
End: 2022-08-22

## 2022-08-16 RX ORDER — FLUTICASONE PROPIONATE 50 MCG
2 SPRAY, SUSPENSION (ML) NASAL DAILY PRN
Status: DISCONTINUED | OUTPATIENT
Start: 2022-08-16 | End: 2022-08-19

## 2022-08-16 RX ORDER — ESCITALOPRAM OXALATE 20 MG/1
20 TABLET ORAL DAILY
Status: ON HOLD | COMMUNITY
End: 2022-08-22

## 2022-08-16 RX ORDER — FLUTICASONE PROPIONATE 50 MCG
2 SPRAY, SUSPENSION (ML) NASAL DAILY PRN
Status: ON HOLD | COMMUNITY
End: 2022-11-28

## 2022-08-16 RX ORDER — HYDROXYZINE HYDROCHLORIDE 25 MG/1
25 TABLET, FILM COATED ORAL EVERY 4 HOURS PRN
Status: DISCONTINUED | OUTPATIENT
Start: 2022-08-16 | End: 2022-08-22 | Stop reason: HOSPADM

## 2022-08-16 RX ORDER — ACETAMINOPHEN 500 MG
1000 TABLET ORAL DAILY PRN
Status: ON HOLD | COMMUNITY
End: 2022-11-28

## 2022-08-16 RX ORDER — LIDOCAINE HYDROCHLORIDE 10 MG/ML
INJECTION, SOLUTION INFILTRATION; PERINEURAL PRN
Status: DISCONTINUED | OUTPATIENT
Start: 2022-08-15 | End: 2022-08-16

## 2022-08-16 RX ADMIN — OLANZAPINE 5 MG: 2.5 TABLET ORAL at 04:30

## 2022-08-16 RX ADMIN — CEFTRIAXONE SODIUM 2 G: 2 INJECTION, SOLUTION INTRAVENOUS at 00:57

## 2022-08-16 RX ADMIN — LORATADINE 10 MG: 10 TABLET ORAL at 22:20

## 2022-08-16 RX ADMIN — IOPAMIDOL 100 ML: 755 INJECTION, SOLUTION INTRAVENOUS at 00:17

## 2022-08-16 RX ADMIN — VANCOMYCIN HYDROCHLORIDE 2000 MG: 10 INJECTION, POWDER, LYOPHILIZED, FOR SOLUTION INTRAVENOUS at 02:41

## 2022-08-16 RX ADMIN — ACYCLOVIR SODIUM 700 MG: 50 INJECTION, SOLUTION INTRAVENOUS at 01:30

## 2022-08-16 RX ADMIN — OLANZAPINE 10 MG: 10 TABLET ORAL at 13:55

## 2022-08-16 RX ADMIN — LORAZEPAM 1 MG: 1 TABLET ORAL at 06:18

## 2022-08-16 RX ADMIN — ACYCLOVIR SODIUM 700 MG: 50 INJECTION, SOLUTION INTRAVENOUS at 09:23

## 2022-08-16 RX ADMIN — ESCITALOPRAM OXALATE 20 MG: 10 TABLET ORAL at 22:20

## 2022-08-16 ASSESSMENT — ACTIVITIES OF DAILY LIVING (ADL)
WALKING_OR_CLIMBING_STAIRS_DIFFICULTY: NO
ADLS_ACUITY_SCORE: 35
ADLS_ACUITY_SCORE: 41
ADLS_ACUITY_SCORE: 41
ADLS_ACUITY_SCORE: 35
DRESSING/BATHING_DIFFICULTY: NO
DIFFICULTY_EATING/SWALLOWING: NO
ADLS_ACUITY_SCORE: 35
ADLS_ACUITY_SCORE: 41
ADLS_ACUITY_SCORE: 35
ADLS_ACUITY_SCORE: 28
WEAR_GLASSES_OR_BLIND: NO
ADLS_ACUITY_SCORE: 41
ADLS_ACUITY_SCORE: 35
FALL_HISTORY_WITHIN_LAST_SIX_MONTHS: NO
ADLS_ACUITY_SCORE: 35
DOING_ERRANDS_INDEPENDENTLY_DIFFICULTY: NO
CONCENTRATING,_REMEMBERING_OR_MAKING_DECISIONS_DIFFICULTY: NO
CHANGE_IN_FUNCTIONAL_STATUS_SINCE_ONSET_OF_CURRENT_ILLNESS/INJURY: NO
TOILETING_ISSUES: NO

## 2022-08-16 NOTE — ED PROVIDER NOTES
History     Chief Complaint   Patient presents with     Drug Overdose       Ilana Aguilar is a 36 year old female who presents with obtunded status.  Patient brought by family to the ED for possible drug overdose.  Patient minimally responds to sternal rub by shaking her head and withdrawing slightly to sternal rub.  Per family she may have taken too many sleep medications.  No further history is obtained.      No Known Allergies    Patient Active Problem List    Diagnosis Date Noted     Generalized anxiety disorder 10/15/2014     Priority: Medium     Overview:   zoloft and effexor ineffective        Panic disorder 2010     Priority: Medium     Cervical high risk human papillomavirus (HPV) DNA test positive 2010     Priority: Medium       Past Medical History:   Diagnosis Date     Acute vaginitis      Anogenital (venereal) warts      Encounter for insertion of intrauterine contraceptive device      Injury of left ankle      Personal history of other medical treatment (CODE)      Personal history of urinary calculi      Personal history of urinary infection      Scoliosis      Social phobia        Past Surgical History:   Procedure Laterality Date     OTHER SURGICAL HISTORY      [id number],COLPOSCOPY       Family History   Problem Relation Age of Onset     Other - See Comments Mother         with traumatic brain injury,/Psychiatric illness,depression     Substance Abuse Mother         Alcohol/Drug,chemical dependency     Heart Disease Mother         Heart Disease,  of heart failure at age 40.     Unknown/Adopted Father         Unknown,Unknown to patient     Family History Negative Brother         Good Health     Genetic Disorder No family hx of         Genetic,Denies any family history of cancer, MI or thyroid disorders.       Social History     Tobacco Use     Smoking status: Never Smoker     Smokeless tobacco: Never Used   Vaping Use     Vaping Use: Never used   Substance Use Topics     Alcohol  "use: No     Alcohol/week: 0.0 standard drinks     Comment: social     Drug use: Never       Medications:    amphetamine-dextroamphetamine (ADDERALL XR) 30 MG 24 hr capsule  amphetamine-dextroamphetamine (ADDERALL) 20 MG tablet  amphetamine-dextroamphetamine (ADDERALL) 30 MG tablet  clotrimazole (LOTRIMIN) 1 % external cream  fluticasone (FLONASE) 50 MCG/ACT nasal spray  loratadine (CLARITIN) 10 MG tablet        Review of Systems: See HPI for pertinent negatives and positives. All other systems reviewed and found to be negative.    Physical Exam   /76   Pulse 91   Temp (!) 96.5  F (35.8  C) (Temporal)   Resp 19   Wt 97.5 kg (215 lb)   SpO2 95%   BMI 37.49 kg/m       General: obtunded  HEENT: atraumatic, normal symmetric pupil diameter, PERRL, no hemotympanum, no nasal drainage  Respiratory: normal effort, clear to auscultation bilaterally  Cardiovascular: regular rate and rhythm, no murmurs  Abdomen: soft, nondistended, nontender  Extremities: no deformities, edema, or tenderness  Skin: warm, dry, no rashes, no ecchymosis, no skin breaks no signs of nonmedical IV track marks or access sites  Neuro: Obtunded, does not respond to painful stimuli    ED Course      ED Course as of 08/16/22 0727   Mon Aug 15, 2022   2144 EKG: NSR, no evidence of acute ischemic with no ST abnormalities, LBBB, I/II/V3-6 TWI.     2144 Per  patient was prescribed and filled zolpidem 5 mg x 30 tabs on 8/9/2022, alprazolam 0.5 mg x 12 tabs and 7/28/2022.   Tue Aug 16, 2022   0605 Patient has woken up overnight and reports taking 28 ambien pills in hopes of falling asleep to escape auditory hallucinations which she has had for \"a long-time\". She also feels quite anxious.       Results for orders placed or performed during the hospital encounter of 08/15/22 (from the past 24 hour(s))   Urine Drugs of Abuse Screen    Narrative    The following orders were created for panel order Urine Drugs of Abuse Screen.  Procedure                "                Abnormality         Status                     ---------                               -----------         ------                     Urine Drugs of Abuse Scr...[[id number]]  Abnormal            Final result                 Please view results for these tests on the individual orders.   HCG qualitative urine   Result Value Ref Range    hCG Urine Qualitative Negative Negative   Urine Drugs of Abuse Screen Panel 13   Result Value Ref Range    Cannabinoids (02-ada-3-carboxy-9-THC) Not Detected Not Detected    Phencyclidine Not Detected Not Detected    Cocaine (Benzoylecgonine) Not Detected Not Detected    Methamphetamine (d-Methamphetamine) Presumptive positive-Unconfirmed result (A) Not Detected    Opiates (Morphine) Not Detected Not Detected    Amphetamine (d-Amphetamine) Presumptive positive-Unconfirmed result (A) Not Detected    Benzodiazepines (Nordiazepam) Not Detected Not Detected    Tricyclic Antidepressants (Desipramine) Not Detected Not Detected    Methadone Not Detected Not Detected    Barbiturates (Butalbital) Not Detected Not Detected    Oxycodone Not Detected Not Detected    Propoxyphene (Norpropoxyphene) Not Detected Not Detected    Buprenorphine Not Detected Not Detected   Comprehensive metabolic panel   Result Value Ref Range    Sodium 138 134 - 144 mmol/L    Potassium 3.5 3.5 - 5.1 mmol/L    Chloride 103 98 - 107 mmol/L    Carbon Dioxide (CO2) 26 21 - 31 mmol/L    Anion Gap 9 3 - 14 mmol/L    Urea Nitrogen 8 7 - 25 mg/dL    Creatinine 0.94 0.60 - 1.20 mg/dL    Calcium 8.8 8.6 - 10.3 mg/dL    Glucose 114 (H) 70 - 105 mg/dL    Alkaline Phosphatase 47 34 - 104 U/L    AST 15 13 - 39 U/L    ALT 15 7 - 52 U/L    Protein Total 6.7 6.4 - 8.9 g/dL    Albumin 3.9 3.5 - 5.7 g/dL    Bilirubin Total 0.4 0.3 - 1.0 mg/dL    GFR Estimate 81 >60 mL/min/1.73m2   CBC with platelets differential    Narrative    The following orders were created for panel order CBC with platelets differential.  Procedure                                Abnormality         Status                     ---------                               -----------         ------                     CBC with platelets and d...[[id number]]                      Final result                 Please view results for these tests on the individual orders.   Blood gas venous and oxyhgb   Result Value Ref Range    pH Venous 7.40 7.32 - 7.43    pCO2 Venous 42 40 - 50 mm Hg    pO2 Venous 119 (H) 25 - 47 mm Hg    Bicarbonate Venous 26 21 - 28 mmol/L    FIO2 28     Oxyhemoglobin Venous 94 (H) 70 - 75 %    Base Excess/Deficit (+/-) 0.8 -7.7 - 1.9 mmol/L   Ammonia (on ice)   Result Value Ref Range    Ammonia 45 16 - 53 umol/L   CBC with platelets and differential   Result Value Ref Range    WBC Count 7.6 4.0 - 11.0 10e3/uL    RBC Count 4.60 3.80 - 5.20 10e6/uL    Hemoglobin 13.5 11.7 - 15.7 g/dL    Hematocrit 40.1 35.0 - 47.0 %    MCV 87 78 - 100 fL    MCH 29.3 26.5 - 33.0 pg    MCHC 33.7 31.5 - 36.5 g/dL    RDW 13.2 10.0 - 15.0 %    Platelet Count 357 150 - 450 10e3/uL    % Neutrophils 67 %    % Lymphocytes 22 %    % Monocytes 9 %    % Eosinophils 1 %    % Basophils 1 %    % Immature Granulocytes 0 %    NRBCs per 100 WBC 0 <1 /100    Absolute Neutrophils 5.1 1.6 - 8.3 10e3/uL    Absolute Lymphocytes 1.7 0.8 - 5.3 10e3/uL    Absolute Monocytes 0.7 0.0 - 1.3 10e3/uL    Absolute Eosinophils 0.1 0.0 - 0.7 10e3/uL    Absolute Basophils 0.1 0.0 - 0.2 10e3/uL    Absolute Immature Granulocytes 0.0 <=0.4 10e3/uL    Absolute NRBCs 0.0 10e3/uL   CT Head w/o Contrast    Addendum: 8/16/2022    Addendum created by Xiomy Thomas MD on 8/16/2022 1:07:26 AM CDT:  Findings were discussed with Dr. Elijah Garza on 8/15/2022 10:58 PM CDT.      Addendum created by Xiomy Thomas MD on 8/15/2022 10:50:38 PM CDT:  THIS REPORT CONTAINS FINDINGS THAT MAY BE CRITICAL TO PATIENT CARE. The   findings were verbally communicated by Dr. Xiomy Thomas to Dr. Jones   via telephone conference  at 10:49 PM CDT on 8/15/2022. The findings were   acknowledged and understood.        Addendum: 8/15/2022    Addendum created by Xiomy Thomas MD on 8/15/2022 10:50:38 PM CDT:  THIS REPORT CONTAINS FINDINGS THAT MAY BE CRITICAL TO PATIENT CARE. The   findings were verbally communicated by Dr. Xiomy Thomas to Dr. Jones   via telephone conference at 10:49 PM CDT on 8/15/2022. The findings were   acknowledged and understood.        Narrative    Initial report created on 8/15/2022 10:49:36 PM CDT:    PROCEDURE INFORMATION:   Exam: CT Head Without Contrast   Exam date and time: 8/15/2022 9:38 PM   Age: 36 years old   Clinical indication: Altered mental status/memory loss; Confusion or   disorientation; Patient HX: PT brought to ED by family for possible drug   overdose. PT uncooperative and not answering triage questions. Could have   possible taken too many sleep meds     TECHNIQUE:   Imaging protocol: Computed tomography of the head without contrast.   Radiation optimization: All CT scans at this facility use at least one of these   dose optimization techniques: automated exposure control; mA and/or kV   adjustment per patient size (includes targeted exams where dose is matched to   clinical indication); or iterative reconstruction.     COMPARISON:   No relevant prior studies available.     FINDINGS:   Brain: Relatively symmetric blurring of gray-white matter differentiation in   the bilateral anterior frontal lobes. No sulcal effacement, significant mass   effect or midline shift. No acute intracranial hemorrhage. No intra- or   extra-axial fluid collection.   Cerebral ventricles: No hydrocephalus.   Paranasal sinuses: No air fluid levels in the visualized paranasal sinuses.   Mastoid air cells: Visualized mastoid air cells are well aerated.     Bones/joints: No acute calvarial or skull base fracture.    Soft tissues: Unremarkable.       Impression    IMPRESSION:   Relatively symmetric blurring of gray-white  matter differentiation in the   bilateral anterior frontal lobes. Findings are nonspecific, but can be seen   with acute concussive type traumatic brain injury, viral encephalitis (i.e.   herpes encephalitis), metabolic and/or drug-induced neurotoxicity, infiltrative   neoplasm, cerebral venous thrombosis with potential subacute stroke. Please   correlate with patient history and physical exam.     THIS DOCUMENT HAS BEEN ELECTRONICALLY SIGNED BY ERIN RIVERA MD   Lactic acid whole blood   Result Value Ref Range    Lactic Acid 0.8 0.7 - 2.0 mmol/L   Glucose CSF:   Result Value Ref Range    Glucose CSF 69 40 - 80 mg/dL    Narrative    CSF glucose concentrations are about 60 percent of normal plasma glucose.  CSF glucose concentrations are about 60 percent of normal plasma glucose.   Protein total CSF:   Result Value Ref Range    Protein total CSF 44 15 - 45 mg/dL    Narrative    This is a lab developed test. It has not been cleared or approved by the FDA.   Gram stain    Specimen: Lumbar Puncture; Cerebrospinal fluid   Result Value Ref Range    Gram Stain Result No PMNs seen     Gram Stain Result No organisms seen    CSF Cell Count with Differential:    Narrative    The following orders were created for panel order CSF Cell Count with Differential:.  Procedure                               Abnormality         Status                     ---------                               -----------         ------                     Cell Count CSF[[id number]]                                   Final result                 Please view results for these tests on the individual orders.   Cell Count CSF   Result Value Ref Range    Tube Number 4     Color Colorless Colorless    Clarity Clear Clear    Total Nucleated Cells 2 0 - 5 /uL    RBC Count 1 0 - 2 /uL   CTA Head with Contrast    Narrative    PROCEDURE INFORMATION:   Exam: CTA Head With Contrast, Venography   Exam date and time: 8/15/2022 11:17 PM   Age: 36 years old   Clinical  indication: Other: AMS     TECHNIQUE:   Imaging protocol: Computed tomography angiography of the head with contrast.   Exam focused on the veins.   3D rendering (Not supervised by radiologist): MIP and/or 3D reconstructed   images were created by the technologist.   Radiation optimization: All CT scans at this facility use at least one of these   dose optimization techniques: automated exposure control; mA and/or kV   adjustment per patient size (includes targeted exams where dose is matched to   clinical indication); or iterative reconstruction.   Contrast material: ISOVUE; Contrast volume: 100 ml; Contrast route: INTRAVENOUS   (IV);      COMPARISON:   CT HEAD W/O CONTRAST 8/15/2022 9:38 PM     FINDINGS:   Limitations: Suboptimal opacification of dural venous sinuses due to poor   intravenous contrast bolus timing.     Superior sagittal sinus: Patent.   Straight sinus: Patent.   Transverse sinuses:  Minimal opacification of the right transverse sinus, which   may be due to poor contrast bolus timing.  Diminutive left transverse sinus,   compatible with congenital variant anatomy.   Sigmoid sinuses: Bilateral sigmoid sinuses are not well opacified, which may be   due to poor contrast bolus timing.   Internal jugular veins:  Bilateral internal jugular veins are not opacified,   most likely on account of poor contrast bolus timing.   Arteries: Intracranial cerebral arteries all appear patent without evidence of   significant stenosis.   Brain: No abnormally enhancing brain lesions. No mass effect or midline shift.   Intracranial cerebral arteries are all patent without evidence of significant   stenosis.   Cerebral ventricles: No hydrocephalus.     Soft tissues: Unremarkable.       Impression    IMPRESSION:   No evidence of dural venous sinus thrombosis within the limits of this exam. Of   note, the superior sagittal sinus does appear to be widely patent.    THIS DOCUMENT HAS BEEN ELECTRONICALLY SIGNED BY ERIN  MD MIGUEL       Medications   acyclovir (ZOVIRAX) 700 mg in sodium chloride 0.9 % 250 mL intermittent infusion (700 mg Intravenous New Bag 8/16/22 0130)   naloxone (NARCAN) injection 0.4 mg (0.4 mg Intravenous Given 8/15/22 2105)   iopamidol (ISOVUE-370) solution 100 mL (100 mLs Intravenous Given 8/16/22 0017)   cefTRIAXone IN D5W (ROCEPHIN) intermittent infusion 2 g (0 g Intravenous Stopped 8/16/22 0707)   vancomycin (VANCOCIN) 2,000 mg in sodium chloride 0.9 % 500 mL intermittent infusion (0 mg Intravenous Stopped 8/16/22 0707)   OLANZapine (zyPREXA) tablet 5 mg (5 mg Oral Given 8/16/22 0430)   LORazepam (ATIVAN) tablet 1 mg (1 mg Oral Given 8/16/22 0618)       Assessments & Plan (with Medical Decision Making)     I have reviewed the nursing notes.    36 year old female evaluated for obtunded state with initial concern for drug overdose.  Patient with minimal response to sternal rub.  Pupils normal diameter.  Narcan 0.4 mg x 2 were given with no response.  Labs and head CT remarkable for abnormal head CT findings with broad differential per radiology.  Discussion with radiology led to pursue head CTA which was unremarkable.  With concern for possible infectious etiology per initial head CT without contrast, LP was performed with unremarkable labs.  Patient was started prophylactically on broad-spectrum antibiotics and acyclovir for possible CNS infection.  Urine drug screen positive for amphetamines consistent with her prescribed stimulants.  [place]  reviewed with recent prescriptions for zolpidem and alprazolam.  Patient remained vitally stable on room air managing her airway.  She eventually woke and stated that she has been having auditory hallucinations that have been disturbing and took 28 Ambien to help sleep and put the hallucinations out of her mind.  Per DEC evaluation patient reports suicidal ideation.  She will need inpatient psych evaluation and management, and behavioral health admission placement  is currently pending.  She was given Zyprexa and Ativan to help with hallucinations and some anxiety.  His 72-hour hold was placed.  Case signed out to dayshift provider Dr. Mendieta.    New Prescriptions    No medications on file       Final diagnoses:   Intentional drug overdose, initial encounter (H) - zolpidem   Suicidal ideation       8/15/2022   [facility]     Elijah Garza MD  08/16/22 0703

## 2022-08-16 NOTE — ANESTHESIA POSTPROCEDURE EVALUATION
Patient: Ilana Aguilar    Procedure: * No procedures listed *       Anesthesia Type:  Spinal    Note:  Disposition: Inpatient   Postop Pain Control: Uneventful            Sign Out: Well controlled pain   PONV: No   Neuro/Psych: Uneventful            Sign Out: Acceptable/Baseline neuro status   Airway/Respiratory: Uneventful            Sign Out: Acceptable/Baseline resp. status   CV/Hemodynamics: Uneventful            Sign Out: Acceptable CV status   Other NRE: NONE   DID A NON-ROUTINE EVENT OCCUR? No           Last vitals:  Vitals:    08/15/22 2300 08/15/22 2315 08/15/22 2330   BP: 115/81  108/80   Pulse: 94 85 89   Resp: (!) 57 17 18   Temp:      SpO2: 100% 100% 100%       Electronically Signed By: SUNIL May CRNA  August 16, 2022  12:16 AM

## 2022-08-16 NOTE — DISCHARGE INSTRUCTIONS
Behavioral Discharge Planning and Instructions    Summary:  The patient is a 36 year old female that was admitted for SI and attempt by overdose.     Main Diagnosis: SI     Health Care Follow-up:     [facility]  Appointment: Had 8/17/2022 @ 2:00 pm Rule 25  [address]  [address]   [facility] [address]   Phone: [phone]  Fax: [phone]     [facility]  Appointment: 10/17/2022 @ 9:00 am for Med Management - Shannan Poe -   Appointment: 8/29/2022 @ 3:00 pm for Individual Therapy   [address] [place] [address]  Phone: [phone]  Fax: [phone]     [facility]   [address] [place] [address]  Appointment: 09/12/2022 @ 11:20 am with Savannah Alcaraz for After Hosptal Follow up.   Make an Appointment:  [phone]    NUMBERS TO CALL IN CRISIS    National Suicide Prevention Lifeline ([facility])  1-693.893.2204    Crisis Text Line ([place])  Text  HOME  to 740660    Mental Health Crisis Line ([place], [place])  652.563.6454    [facility] Mobile Crisis ([place], [place])   [phone]      If you are feeling very unsafe, call 911 or bring yourself to the Emergency Room        Counseling in [place]:  Andres Ramos           [phone]  [facility]    [facility]      [phone]  [facility]     Ivette Walker [facility] (young kids)    [phone]   Susan Gallegos [facility] (older kids & adults)  [phone]   Slava Pittman ANDREW      [phone]  [facility]       [phone]   Abhilash Jimenez MS, LP   Lizette Min MA, LPC   Juan Villalobos MS psychotherapist   Karen Onofre MS, LPC   Robina Castanon ANDREW, counselor   Nguyen Jimenez ANDREW, counselor  David        [phone]  Regis Ceja, counseling  Dr Frances Sesay, psychiatry  Nicol Oneill, counseling  [place], counseling  Myriam Mix, counseling  Denise Gomez, psychiatry   [facility]       [phone]   Savannah Gutierrez  MA, LMFT, RPFS   Alicia Vargas MSW, [facility]          [phone]  [facility]      [phone]   Nguyen Pelletier MS, [facility]          [phone]        Smiley Diez MSW, LIC , C-MI   Brianne Fabiano MSW, LGSW                                                    Braulio Marinok [facility]      Amber Risisabel MS, [facility]                [phone]      Ofe Lujan PsyD     Temitope Hernández PsyD, owner      Alyssa Kyree PsyD    Ruth Ann Frey MPAS, PAEVAN Sotelo PhD   Nighat Pal MSW, LICSW [facility]       [phone]   Merissa Sims [facility]   Pritesh Vidal APRN, CNP,     Mello Christianson MSW, [facility] (adolescents)   Jackie Grinnel APRN, CNP (Hib via telehealth; adolescents)   Rosemarie ESTRADA, CNP (Hib via telehealth)   Heraclio Mccloud MA, LPC, BCIA (Hib via telehealth)   Robyn Caraballo [facility] MSW, SW   Lindsay Hollis [facility]   Brent Fernández DNP, APRN, CNP   Violeta Zaman RN, BSN   Mago Ortega RN-BC, BSN (Hib via telehealth)  [facility]         [phone]   Shannan Amin, MSN, [facility]           [phone]   Dennis Young MA, LMFT   Mable Dillard MS RN PM NP   Nguyen Tabor, [facility]         [phone]  [facility]       [phone]   [facility]   Jolly Jacome (to be Psychiatric)   Dominick Gentile (to be Psychiatric)      Counseling in [place]:  [facility]       [phone]   mental health & CD counseling  Heraclio Grigsby       [phone]  [facility]       [phone]  [facility]        [phone]   Savannah Leon  [facility]       [facility]              [phone]   can do weekends and evenings   DeLleonila Soto, MSW, LICSW, LCSW, CCTP    Quinton Finnegan MA, LMFT, LICSW  [place] [facility]       evenings, weekends  [phone]      Counseling in [place]:  [facility]          [phone]   Shannan Amin, MSN, PMHNP-BC   Mira More MA, [facility]      [phone]  [facility]       [phone]   Madiha [facility]  [facility]       Shilpa Sahu       [phone]  [facility]    [address]     [facility]      [phone]  [facility]     Hill Mullins MS Ed, LMFT    [phone]    (kids) denotes providers who also see kids     Attend all scheduled appointments with your outpatient providers. Call at least 24 hours in advance if you need to reschedule an appointment to ensure continued access to your outpatient providers.     Major Treatments, Procedures and Findings:  You were provided with: a psychiatric assessment, assessed for medical stability, medication evaluation and/or management, group therapy, family therapy, individual therapy, CD evaluation/assessment, milieu management, and medical interventions    Symptoms to Report: feeling more aggressive, increased confusion, losing more sleep, mood getting worse, or thoughts of suicide    Early warning signs can include: increased depression or anxiety sleep disturbances increased thoughts or behaviors of suicide or self-harm  increased unusual thinking, such as paranoia or hearing voices      Resources:   Crisis Intervention: 407.224.5919 or 807-725-1813 (TTY: 658.893.7183).  Call anytime for help.  National [place] on Mental Illness (www.mn.duc.org): 967.232.1415 or 264-606-0362.  [place] Association for Children's Mental Health (www.macmh.org): 571.499.8017.  Alcoholics Anonymous (www.alcoholics-anonymous.org): Check your phone book for your local chapter.  Suicide Awareness Voices of Education (SAVE) (www.save.org): 701-840-OTIV (9378)  National Suicide Prevention Line (www.mentalhealthmn.org): 442-141-NAGM (1178)  Mental Health Consumer/Survivor Network of [place] (www.mhcsn.net): 469.426.5780 or  "225-373-4538  Mental Health Association of [place] (www.mentalhealth.org): 737.795.5417 or 821-732-2477  Self- Management and Recovery Training., SMART-- Toll free: 910.229.9563  NGI[employer]  Text 4 Life: txt \"LIFE\" to 41451 for immediate support and crisis intervention  Crisis text line: Text \"[place]\" to 303828. Free, confidential, 24/7.  Crisis Intervention: 180.544.8868 or 555-729-7299. Call anytime for help.     General Medication Instructions:   See your medication sheet(s) for instructions.   Take all medicines as directed.  Make no changes unless your doctor suggests them.   Go to all your doctor visits.  Be sure to have all your required lab tests. This way, your medicines can be refilled on time.  Do not use any drugs not prescribed by your doctor.  Avoid alcohol.    Advance Directives:   Scanned document on file with [employer]?    Is document scanned?    Honoring Choices Your Rights Handout:    Was more information offered?      The Treatment team has appreciated the opportunity to work with you. If you have any questions or concerns about your recent admission, you can contact the unit which can receive your call 24 hours a day, 7 days a week. They will be able to get in touch with a Provider if needed. The unit number is [phone] .    [facility] Area:  [facility], [facility] stabilization [facility]- [phone]  [facility] Crisis Line: 1-683.247.1053  Advocates For Family Peace: 964-2631  Sexual Assault Program [facility]: 370.762.2596 or 1-206.833.2335  [place] [facility] Battered Women's Program: 4-710-378-4447 Ext: 279       Calls answered Mon-Fri-8:00 am--4:30 pm    [place]:  Advocates for Family Peace: 5-021-959-6587  [facility] - 1-885.332.7414  [facility] first call for help: 2-383-491-2015  Located within [facility] Crisis Center:  ([phone]) [phone]    [place] Area:  Warm Line: 1-723.505.9793       Calls answered Tuesday--Saturday 4:00 pm--10:00 pm  [place] Crisis Line - " "128-139-8288  [place] [facility]abilization 116-008-9414    [place]wide:  [place] Crisis and Referral Services: 7-065-504-6175  National Suicide Prevention Lifeline: 1-445-356-TALK (6247)   - fvj9oogh- Text \"Life\" to 15870  First Call for Help: 2-1-1  CHRIS Helpline- 7-750-KPRD-HELP   Crisis Text Line: Text  [place]  to 252580     [place] Area:  [facility], Crisis stabilization housing- 270.291.1096  Community Health Crisis Line: 4-764-308-0302  Advocates For Family Peace: 347-7124  Sexual Assault Program [facility]: 507.155.3320 or 1-671.736.7430  [facility] Battered Women's Program: 9-401-499-5287 Ext: 279       Calls answered Mon-Fri-8:00 am--4:30 pm    [place]:  Advocates for Family Peace: 3-194-780-3699  [facility] - 9-732-195-6353  [facility] first call for help: 6-255-392-2704  [facility] Crisis Center:  (360) 732-5185    [place] Area:  Warm Line: 1-873.949.8598       Calls answered Tuesday--Saturday 4:00 pm--10:00 pm  [place] Crisis Line - 535.249.8584  [place] [facility] 617-358-3795    [place]:  [place] Crisis and Referral Services: 6-691-594-5293  National Suicide Prevention Lifeline: 5-471-878-TALK (4404)   - jhq3veso- Text \"Life\" to 22802  First Call for Help: 2-1-1  CHRIS Helpline- 8-393-WXSM-HELP   Crisis Text Line: Text  [place]  to 684669   "

## 2022-08-16 NOTE — ED NOTES
/85   Pulse 96   Temp (!) 96.5  F (35.8  C) (Temporal)   Resp 21   Wt 97.5 kg (215 lb)   SpO2 98%   BMI 37.49 kg/m      Pt sleeping, sitter at bedside  Brabara Merino RN.............................8/16/2022 5:19 AM

## 2022-08-16 NOTE — LETTER
[address] [address]  Phone: [phone]  Fax: [phone]            08/22/22        Ilana Aguilar  [address]  [place], located within [facility] [address]          To whom it may concern:       Ms Ilana Aguilar has been under our care in hospital from 8/15/2022 through 8/22/2022 and discharged today in stable condition.      Sincerely,            Mable Paris, APRN, CNP

## 2022-08-16 NOTE — PHARMACY-ADMISSION MEDICATION HISTORY
Pharmacy -- Admission Medication Reconciliation    Prior to admission (PTA) medications were reviewed and the patient's PTA medication list was updated.    Sources Consulted: Patient, [facility] for medications prescribed by Shannan Pastor [phone], , Sure Scripts    The reliability of this Medication Reconciliation is: Reliability: Reliable    The following significant changes were made:  Clarified formulation/dose/directions of Adderall  Removed clotrimazole per patient  Clarified directions of Flonase per patient PRN  Added escitalopram  Added zolpidem   Added alprazolam  Added Tylenol PRN  Added Tums PRN (patient takes on average twice daily)    In addition, the patient's allergies were reviewed with the patient and updated as follows:   Allergies: Patient has no known allergies.    The pharmacist has reviewed with the patient that all personal medications should be removed from the building or locked in the belongings safe.  Patient shall only take medications ordered by the physician and administered by the nursing staff.       Medication barriers identified: none noted   Medication adherence concerns: none noted   Understanding of emergency medications: BRITANY Taylor [facility], 8/16/2022,  12:18 PM

## 2022-08-16 NOTE — ANESTHESIA CARE TRANSFER NOTE
Patient: Ilana Aguilar    Procedure: * No procedures listed *       Diagnosis: * No pre-op diagnosis entered *  Diagnosis Additional Information: No value filed.    Anesthesia Type:   Spinal     Note:    Oropharynx: oropharynx clear of all foreign objects  Level of consciousness: somnolence as baseline.  Oxygen Supplementation: nasal cannula (per nursing)    Independent Airway: airway patency satisfactory and stable  Dentition: dentition unchanged  Vital Signs Stable: post-procedure vital signs reviewed and stable  Report to RN Given: handoff report given  Patient transferred to: Emergency Department    Handoff Report: Identifed the Patient, Identified the Reponsible Provider, Reviewed the pertinent medical history, Discussed the surgical course, Reviewed Intra-OP anesthesia mangement and issues during anesthesia, Set expectations for post-procedure period and Allowed opportunity for questions and acknowledgement of understanding      Vitals:  Vitals Value Taken Time   /71 08/16/22 0004   Temp     Pulse 84 08/16/22 0014   Resp 19 08/16/22 0014   SpO2 97 % 08/16/22 0014   Vitals shown include unvalidated device data.    Electronically Signed By: SUNIL May CRNA  August 16, 2022  12:15 AM

## 2022-08-16 NOTE — PROGRESS NOTES
08/16/22 1413   Patient Belongings   Did you bring any home meds/supplements to the hospital?  No   Patient Belongings locker;remains with patient   Patient Belongings Remaining with Patient other (see comments)  (2x grey metal rings)   Patient Belongings Put in Hospital Secure Location (Security or Locker, etc.) other (see comments)  (gray sweatpants, x2 black t-shirts, white tank top, jeans, underwear, bra)   Belongings Search Yes   Clothing Search Yes   Second Staff .   Comment  --    List items sent to safe:N/A  All other belongings put in assigned cubby in belongings room.   I have reviewed my belongings list on admission and verify that it is correct.     Patient signature_______________________________    Second staff witness (if patient unable to sign) ______________________________       I have received all my belongings at discharge.    Patient signature________________________________    ADRIAN Timmons  8/16/2022  2:24 PM

## 2022-08-16 NOTE — ED NOTES
Pt opens eyes spontaneously, able to give name and birthday, following direction appropriately.  Was confused on when and how she came to ED.    Barbara Merino RN.............................8/16/2022 2:57 AM

## 2022-08-16 NOTE — PROGRESS NOTES
"    Social Service Psychosocial Assessment  Presenting Problem:   The patient is a 36 year old female that presented to the ED due to a SI attempt by overdose.   Patient is her own guardian.  Marital Status:      Spouse / Children:    Not  / Has children - Mentioned they were taken away. Patient did not go into great detail of why her  Children were taken away, but she blames her family.   Psychiatric TX HX:   Goes to [facility] for Medication Management and Therapy - Patient denies past inpatient hospitalizations.   Suicide Risk Assessment:  The patient was SI with a overdose attempt, has has past SI, and endorses SI today \" I just want to die.\"   Access to Lethal Means (explain):   The patient denies access to lethal means.   Family Psych HX:   The patient shared her mom dies by SI.   A & Ox:   X 3  Medication Adherence:   Unknown, please refer to H&P   Medical Issues:   Unknown, please refer to H&P   Visual -Motor Functioning:   Good, no issues noticed at this time.   Communication Skills /Needs:   Good, no issues noticed at this time.   Ethnicity:   White     Spirituality/Muslim Affiliation:   Non denomination Sikhism    Clergy Request:   No   History:  None  Living Situation:   Lives with her dad and plans on going back there at discharge.   ADL s:  Independently   Education:  Graduated Highs School, no other education or training   Financial Situation:   Pay from employer   Occupation: [employer] (NOW)  -  in [facility] Ilana Aguilar   [phone]  [email or web address]  Leisure & Recreation:  Art, music, and reading.   Childhood History:   The patient said, \" Very Good.\"   Trauma Abuse HX:   Patient denies trauma or abuse   Relationship / Sexuality:   Heterosexual - not in a current relationship   Substance Use/ Abuse:  Patient indicates she uses methamphetamine and alcohol.   Patient's utox was " "methamphetamine positive, positive for amphetamine and pateint is prscribed Adderall  Chemical Dependency Treatment HX:     Patient indicates she uses methamphetamine and alcohol.   Patient's utox was methamphetamine positive, positive for amphetamine and pateint is prscribed Adderall  Legal Issues:   Patient answered, \" Not that I know of.\"   Significant Life Events:   Graduating High School, getting a divorce, and having her children.   Strengths:   Has current services, is in a safe place, and has a place to live.   Challenges /Limitation:   Current MH, SI, current stressors in her life in relation to having her kids taken away.   Patient Support Contact (Include name, relationship, number, and summary of conversation):   NO MIRELLA signed at this time   Interventions:        Vulnerable Adult/Child Report NA     Community-Based Programs [facility]     Medical/Dental Care - [facility] [place] - in [facility]     Home Care - NA   CD Evaluation/Rule 25/Aftercare - The patient would benefit - The patient wanted to talk about options later. Utox was methamphetamine positive, positive for amphetamine and pateint is prscribed Adderall.    Medication Management - Psychiatrist:Shannan Amin, MSN, PMHNP-BC [employer]    Individual Therapy - Therapist: Dr. Aracelis Casey, Ph.D, L.P. [email or web address]    Clergy Request NA     Housing/Placement - Lives with her dad     Case Management - Would benefit   Insurance Coverage - BCBS/BCBS OUT OF STATE, and  COMMERCIAL/IMCARE PMAP    Financial Assistance Might benefit     Commit/Rock Screening ???  72 hr hold. Hold start date/time: 8/16/22    Suicide Risk Assessment - The patient was SI with a overdose attempt, has has past SI, and endorses SI today \" I just want to die.\"     High Risk Safety Plan Talk to supports; Call crisis lines; Go to local ER if feeling suicidal.  LORETA Moreno  8/16/2022  1:55 PM    "

## 2022-08-16 NOTE — ED NOTES
Pt resting in room.  Arouses to gentle shaking.  Barbara Merino RN.............................8/15/2022 10:39 PM

## 2022-08-16 NOTE — ED PROVIDER NOTES
08/16/22   7:00 AM    I am accepting the care of this patient from Dr Garza at change of shift.  Ilana Aguilar is a 35 yo female who arrived obtunded. She had just filled a Rx for Ambien and alprazolam.  Labs show CBC normal, CMP with glucose 114, VBG with pO2 119, 4 Plex negative, lactic acid normal, UPT negative, UDS positive for meth, ammonia normal, 4 Plex negative, BC x 2 pending.  She was given Narcan with no change.  CT of the head shows many possible findings.  CTA head unremarkable.  LP unremarkable.  She was given vancomycin and Rocephin and acyclovir.  She did wake up and admitted to taking 28 Ambien and having auditory hallucinations.  She was seen by DEC and they recommend inpatient. She is on a 72 Hour Hold.  There is a bed at [employer] [facility] [employer]. They have been here now 11 hours.     ED Course    She is more stable and has been moved to room 10.  Labs: ethanol  zero    Diagnosis    (T50.902A) Intentional drug overdose, initial encounter (H)  Comment: zolpidem    (R45.851) Suicidal ideation    (F15.10) Methamphetamine abuse (H)        Plan     She has been accepted to [facility]  08/16/22 3530

## 2022-08-16 NOTE — ED NOTES
/80   Pulse 81   Temp (!) 96.5  F (35.8  C) (Temporal)   Resp 16   Wt 97.5 kg (215 lb)   SpO2 95%   BMI 37.49 kg/m      VS.  Obtunded, will arouse to sternal rub.  Unable to follow with eyes or other commands.  Responds to pain.  Grinding teeth and unable to swallow all secretions.  Clear, normal looking saliva noted upon suction.  Pt sleeping in room.  Barbara Merino RN.............................8/16/2022 1:53 AM

## 2022-08-16 NOTE — PROGRESS NOTES
Called to Bedside to assess Patient For possible Intubation. Intubation tube Set up and ready to use at bedside if needed will continue to Monitor Patient. Patient Currently Respiratory lares stable.    RT Chantale on 8/15/2022 at 11:39 PM

## 2022-08-16 NOTE — CONSULTS
"Diagnostic Evaluation Consultation  Crisis Assessment    Patient was assessed: Enrico  Patient location: [facility] ED  Was a release of information signed: No. Reason: patient refusal to sign release      Referral Data and Chief Complaint  Ilana is a 36 year old, who uses she/her pronouns, and presents to the ED with family/friends. Patient is referred to the ED by family/friends. Patient is presenting to the ED for the following concerns: suicide attempt by overdose of pills. Patient currently reports not wanting to live and feeling as though \"everyone is better off without me.\"      Informed Consent and Assessment Methods     Patient is her own guardian. Writer met with patient and explained the crisis assessment process, including applicable information disclosures and limits to confidentiality, assessed understanding of the process, and obtained consent to proceed with the assessment. Patient was observed to be able to participate in the assessment as evidenced by able to answer questions and refused to sign release. Assessment methods included conducting a formal interview with patient, review of medical records, collaboration with medical staff, and obtaining relevant collateral information from family and community providers when available..     Over the course of this crisis assessment offered validation, engaged patient in problem solving and disposition planning, worked with patient on brief, therapeutic activity: MI, asked open ended questions and assisted in processing patient's thoughts and feeling relating to desire to die and anger towards family.. Patient's response to interventions was agitated and did not want to answer questions. Stated too much and overwhelmed with others talking and noises in ED.     Summary of Patient Situation       Patient attempted suicide by ingestion of medication. Patient reports that her family and others were in on her losing custody of children today. She reports " "that her children are better off with her dead. She indicate she uses methamphetamine and alcohol. Methamphetamine is on board and states she used alcohol daily. Patient reports she is in therapy every other week and finds it and medication to be helpful. Patient currently endorses desire to die and indicates \"Everyone being in on it and no one saying anything\" as the reason she no longer has custody of her children who are with her grandparents. \"I just ruin people's lives.\" Patient is tearful, variable eye contact, agitation, and quick short answers, and not fully cooperative as stated due to irritability \"sounds, noises from everywhere.\"    Brief Psychosocial History       Patient was not willing to divulge information she stated there was too much noise, voices going on for her to communicate and concentrate.    Significant Clinical History       Patient is prescribed adderall, lexapro and ambien. She reports she uses methamphetamine and alcohol. She reports that she has had one suicide attempt before. She reports self medicating as she is (alone, angry, and sad). Does not divulge dx but states, \"I am batshit crazy\" Denies prior hospitalization or prior mental health therapy. History in chart there is documentation in regards to YASMANI and panic disorder.     Collateral Information  Patient refusal for cross communication with any other family or providers.  Review of EMR Hx of recent return to [place] from [place].     Risk Assessment  ESS-6  1.a. Over the past 2 weeks, have you had thoughts of killing yourself? Yes  1.b. Have you ever attempted to kill yourself and, if yes, when did this last happen? Yes today and 2 years ago   2. Recent or current suicide plan? Yes desire to die by overdose   3. Recent or current intent to act on ideation? Yes  4. Lifetime psychiatric hospitalization? No  5. Pattern of excessive substance use? Yes  6. Current irritability, agitation, or aggression? Yes  Scoring note: BOTH 1a " and 1b must be yes for it to score 1 point, if both are not yes it is zero. All others are 1 point per number. If all questions 1a/1b - 6 are no, risk is negligible. If one of 1a/1b is yes, then risk is mild. If either question 2 or 3, but not both, is yes, then risk is automatically moderate regardless of total score. If both 2 and 3 are yes, risk is automatically high regardless of total score.      Score: 5, high risk      Does the patient have access to lethal means? Yes - describe access to pills and illicit substances     Does the patient engage in non-suicidal self-injurious behavior (NSSI/SIB)? no     Does the patient have thoughts of harming others? No     Is the patient engaging in sexually inappropriate behavior?  no        Current Substance Abuse     Is there recent substance abuse? alcohol daily use and methamphetamine 1xevery 2 weeks     Was a urine drug screen or blood alcohol level obtained: Yes methamphetamine positive, positive for amphetamine and pateint is prscribed Adderall       Mental Status Exam     Affect: Constricted   Appearance: Disheveled    Attention Span/Concentration: Inattentive  Eye Contact: Avoidant/variable  Fund of Knowledge: Appropriate    Language /Speech Content: Fluent   Language /Speech Volume: Soft and Normal    Language /Speech Rate/Productions: Minimally Responsive and Normal    Recent Memory: Intact   Remote Memory: Intact   Mood: Anxious, Depressed and Irritable    Orientation to Person: Yes    Orientation to Place: Yes   Orientation to Time of Day: Yes    Orientation to Date: Yes    Situation (Do they understand why they are here?): Yes    Psychomotor Behavior: Agitated    Thought Content: Suicidal   Thought Form: Goal Directed and Intact      History of commitment: No           Medication    Psychotropic medications: Yes. Pt is currently taking Ambien, Adderall, Lexapro. Medication compliant: Yes. Recent medication changes: No  Medication changes made in the last two  "weeks: No       Current Care Team    Primary Care Provider: No  Psychiatrist:Shannan Amin, MSN, PMHNP-BC [place]  Therapist: Dr. Aracelis Casey, Ph.D, L.P. rosetta [place]  :      CTSS or ARMHS:   ACT Team:   Other:       Diagnosis    Substance-Related & Addictive Disorders 292.89 Stimulant Intoxication,  292.89 Amphetamine or other stimulant, Without perceptual disturbances:  (F15.929) Without use disorder   Substance-Related & Addictive Disorders Alcohol Use Disorder   303.90 (F10.20) Moderate without perceptual disturbances - by history   300.02 (F41.1) Generalized Anxiety Disorder - primary     311 (F32.9) Unspecified Depressive Disorder , provisional;        301.9 (F60.9) Unspecified Personality Disorder, provisional;             Clinical Summary and Substantiation of Recommendations    Patient was brought to the ED by father after a suspected overdose. Patient reports she took pills in order to die. Patient endorses suicidal ideation with desire, intent and capability to not live. She is tearful at times and states that \"everyone would be better without me.\" Patient states these thoughts have been present for a long time. Patient has a prior attempt and has no hx of hospitalization. She reports substance use of daily alcohol and methamphetamine use once every couple weeks. Patient is agitated and irritable. Does not want to participate in session and states that there are too many distractions. Patient reports no longer wanting to live, has therapy and psychiatry, and her children were removed from her custody. Reports using to self medicate feeling \"alone, angry, and sad.\" While she thinks therapy has been helpful she still claims she wants to die. Patient takes medications for mental helath symptoms and also is using substances which can interfere with symptoms, judgment and ability to use effective coping mechanisms. Due to imminent risk she is to be placed inpatient.   Will " not contact [facility] as patient refused for us to contact and would not sign release for documentation to go to providers.    Disposition    Recommended disposition: Inpatient Mental Health       Reviewed case and recommendations with attending provider. Attending Name: Dr Garza        Attending concurs with disposition: Yes       Patient concurs with disposition: No: patient does not want to live       Guardian concurs with disposition: NA      Final disposition: Inpatient mental health .     Inpatient Details (if applicable):  Is patient admitted voluntarily:No, 72 hr hold. Hold start date/time: 8/16/22      Patient aware of potential for transfer if there is not appropriate placement? No       Patient is willing to travel outside of the [facility] for placement? No      Behavioral Intake Notified? Yes: Date: 8/16/22 Time: 6:15am.         Assessment Details    Patient interview started at: 5:54am and completed at: 6:13am.     Total duration spent on the patient case in minutes: .75 hrs      CPT code(s) utilized: 55376 - Psychotherapy for Crisis - 60 (30-74*) min       Shanice Marvin LPCC, MS, LPCC, LADC  DEC - Triage & Transition Services

## 2022-08-16 NOTE — H&P
[facility]    History and Physical  Medical Services       Date of Admission:  8/16/2022  Date of Service (when I saw the patient): 08/16/22    Assessment & Plan     Principal Problem:    Suicidal ideation    ED course- Head CT showed Relatively symmetric blurring of gray-white matter differentiation in the bilateral anterior frontal lobes. Findings are nonspecific, but can be seen with acute concussive type traumatic brain injury, viral encephalitis (i.e. herpes encephalitis), metabolic and/or drug-induced neurotoxicity, infiltrative  neoplasm, cerebral venous thrombosis with potential subacute stroke. Please correlate with patient history and physical exam. Pt was prophylactically on broad-spectrum antibiotics and acyclovir for possible CNS infection. LP was performed and unremarkable. Some labs are still in process. Blood cultures preliminary shows no growth.   UDS positive for methamphetamine. CMP unremarkable. CBC wnl. HCG negative.     Pt medically stable, no acute medical concerns. Chronic medical problems stable. Will sign off. Please consult for any new medical issues or concerns.       Code Status: Full Code    Lindsay Mcmahon CNP    Primary Care Physician   Physician No Ref-Primary    Chief Complaint   Psych evaluation     History is obtained from the patient and medical chart     History of Present Illness   (per ED) Ilana Aguilar is a 36 year old female who presents with obtunded status.  Patient brought by family to the ED for possible drug overdose.  Patient minimally responds to sternal rub by shaking her head and withdrawing slightly to sternal rub.  Per family she may have taken too many sleep medications.  No further history is obtained.      Past Medical History    I have reviewed this patient's medical history and updated it with pertinent information if needed.   Past Medical History:   Diagnosis Date     Acute vaginitis     History of bacterial vaginosis     Anogenital (venereal)  warts     No Comments Provided     Encounter for insertion of intrauterine contraceptive device     14,Paragard Lot# [id number] Exp 2020     Injury of left ankle     Left ankle - denied per patient     Personal history of other medical treatment (CODE)     ,  ( one AB at age 20)     Personal history of urinary calculi     No Comments Provided     Personal history of urinary infection     No Comments Provided     Scoliosis     No Comments Provided     Social phobia     No Comments Provided       Past Surgical History   I have reviewed this patient's surgical history and updated it with pertinent information if needed.  Past Surgical History:   Procedure Laterality Date     OTHER SURGICAL HISTORY      [id number],COLPOSCOPY       Prior to Admission Medications   Prior to Admission Medications   Prescriptions Last Dose Informant Patient Reported? Taking?   ALPRAZolam (XANAX) 0.5 MG tablet  Self Yes No   Sig: Take 0.25-0.5 mg by mouth three times a week - as needed for panic attack   acetaminophen (TYLENOL) 500 MG tablet  Self Yes No   Sig: Take 1,000 mg by mouth daily as needed (headache)   amphetamine-dextroamphetamine (ADDERALL) 20 MG tablet  Self Yes No   Sig: Take 20 mg by mouth 3 times daily   calcium carbonate (TUMS) 500 MG chewable tablet  Self Yes No   Sig: Take 4 chew tab by mouth 4 times daily as needed for heartburn   escitalopram (LEXAPRO) 20 MG tablet  Self Yes No   Sig: Take 20 mg by mouth daily   fluticasone (FLONASE) 50 MCG/ACT nasal spray  Self Yes No   Sig: Spray 2 sprays into both nostrils daily as needed for allergies   loratadine (CLARITIN) 10 MG tablet  Self Yes No   Sig: Take 10 mg by mouth daily   zolpidem (AMBIEN) 5 MG tablet  Self Yes No   Sig: Take 5 mg by mouth At Bedtime      Facility-Administered Medications: None     Allergies   No Known Allergies    Social History   I have reviewed this patient's social history and updated it with pertinent information if needed. Ilana Aguilar   reports that she has never smoked. She has never used smokeless tobacco. She reports that she does not drink alcohol and does not use drugs.    Family History   I have reviewed this patient's family history and updated it with pertinent information if needed.   Family History   Problem Relation Age of Onset     Other - See Comments Mother         with traumatic brain injury,/Psychiatric illness,depression     Substance Abuse Mother         Alcohol/Drug,chemical dependency     Heart Disease Mother         Heart Disease,  of heart failure at age 40.     Unknown/Adopted Father         Unknown,Unknown to patient     Family History Negative Brother         Good Health     Genetic Disorder No family hx of         Genetic,Denies any family history of cancer, MI or thyroid disorders.       Review of Systems   CONSTITUTIONAL:  negative  EYES:  negative  HEENT:  negative  RESPIRATORY:  negative  CARDIOVASCULAR:  negative  GASTROINTESTINAL:  negative  GENITOURINARY:  negative  INTEGUMENT/BREAST:  negative  HEMATOLOGIC/LYMPHATIC:  negative  ALLERGIC/IMMUNOLOGIC:  negative  ENDOCRINE:  negative  MUSCULOSKELETAL:  negative  NEUROLOGICAL:  negative    Physical Exam                      Vital Signs with Ranges  Temp:  [96.5  F (35.8  C)] 96.5  F (35.8  C)  Pulse:  [] 89  Resp:  [6-57] 13  BP: ()/() 99/66  SpO2:  [94 %-100 %] 95 %  0 lbs 0 oz    Constitutional: awake, alert, cooperative, no apparent distress, and appears stated age, vitals stable   Eyes: Lids and lashes normal, pupils equal, round and reactive to light, extra ocular muscles intact, sclera clear, conjunctiva normal  ENT: Normocephalic, without obvious abnormality, atraumatic, external ears without lesions, oral pharynx with moist mucous membranes, no erythema or exudates  Hematologic / Lymphatic: no cervical lymphadenopathy  Respiratory: No increased work of breathing, good air exchange, clear to auscultation bilaterally, no crackles or  wheezing  Cardiovascular: Normal apical impulse, regular rate and rhythm, normal S1 and S2, no S3 or S4, and no murmur noted  GI: No scars, normal bowel sounds, soft, non-distended, non-tender, no masses palpated, no hepatosplenomegally  Genitounirinary: deferred  Skin: normal skin color, texture, turgor, no redness, warmth, or swelling and no rashes  Musculoskeletal: There is no redness, warmth, or swelling of the joints.  Full range of motion noted.  Motor strength is 5 out of 5 all extremities bilaterally.  Tone is normal.  Neurologic: Awake, alert, oriented to name, place and time.  Cranial nerves II-XII are grossly intact.  Motor is 5 out of 5 bilaterally.  Cerebellar finger to nose, heel to shin intact.  Sensory is intact.  Babinski down going, Romberg negative, and gait is normal.  Neuropsychiatric: General: tearful and normal eye contact    Data   Data reviewed today:   Recent Labs   Lab 08/15/22  2117   WBC 7.6   HGB 13.5   MCV 87         POTASSIUM 3.5   CHLORIDE 103   CO2 26   BUN 8   CR 0.94   ANIONGAP 9   COOPER 8.8   *   ALBUMIN 3.9   PROTTOTAL 6.7   BILITOTAL 0.4   ALKPHOS 47   ALT 15   AST 15       Recent Results (from the past 24 hour(s))   CT Head w/o Contrast    Addendum: 8/16/2022    Addendum created by Xiomy Thomas MD on 8/16/2022 1:07:26 AM CDT:  Findings were discussed with Dr. Elijah Garza on 8/15/2022 10:58 PM CDT.      Addendum created by Xiomy Thomas MD on 8/15/2022 10:50:38 PM CDT:  THIS REPORT CONTAINS FINDINGS THAT MAY BE CRITICAL TO PATIENT CARE. The   findings were verbally communicated by Dr. Xiomy Thomas to Dr. Jones   via telephone conference at 10:49 PM CDT on 8/15/2022. The findings were   acknowledged and understood.        Addendum: 8/15/2022    Addendum created by Xiomy Thomas MD on 8/15/2022 10:50:38 PM CDT:  THIS REPORT CONTAINS FINDINGS THAT MAY BE CRITICAL TO PATIENT CARE. The   findings were verbally communicated by Dr. Xiomy Thomas  to Dr. Jones   via telephone conference at 10:49 PM CDT on 8/15/2022. The findings were   acknowledged and understood.        Narrative    Initial report created on 8/15/2022 10:49:36 PM CDT:    PROCEDURE INFORMATION:   Exam: CT Head Without Contrast   Exam date and time: 8/15/2022 9:38 PM   Age: 36 years old   Clinical indication: Altered mental status/memory loss; Confusion or   disorientation; Patient HX: PT brought to ED by family for possible drug   overdose. PT uncooperative and not answering triage questions. Could have   possible taken too many sleep meds     TECHNIQUE:   Imaging protocol: Computed tomography of the head without contrast.   Radiation optimization: All CT scans at this facility use at least one of these   dose optimization techniques: automated exposure control; mA and/or kV   adjustment per patient size (includes targeted exams where dose is matched to   clinical indication); or iterative reconstruction.     COMPARISON:   No relevant prior studies available.     FINDINGS:   Brain: Relatively symmetric blurring of gray-white matter differentiation in   the bilateral anterior frontal lobes. No sulcal effacement, significant mass   effect or midline shift. No acute intracranial hemorrhage. No intra- or   extra-axial fluid collection.   Cerebral ventricles: No hydrocephalus.   Paranasal sinuses: No air fluid levels in the visualized paranasal sinuses.   Mastoid air cells: Visualized mastoid air cells are well aerated.     Bones/joints: No acute calvarial or skull base fracture.    Soft tissues: Unremarkable.       Impression    IMPRESSION:   Relatively symmetric blurring of gray-white matter differentiation in the   bilateral anterior frontal lobes. Findings are nonspecific, but can be seen   with acute concussive type traumatic brain injury, viral encephalitis (i.e.   herpes encephalitis), metabolic and/or drug-induced neurotoxicity, infiltrative   neoplasm, cerebral venous thrombosis with  potential subacute stroke. Please   correlate with patient history and physical exam.     THIS DOCUMENT HAS BEEN ELECTRONICALLY SIGNED BY ERIN RIVERA MD   CTA Head with Contrast    Narrative    PROCEDURE INFORMATION:   Exam: CTA Head With Contrast, Venography   Exam date and time: 8/15/2022 11:17 PM   Age: 36 years old   Clinical indication: Other: AMS     TECHNIQUE:   Imaging protocol: Computed tomography angiography of the head with contrast.   Exam focused on the veins.   3D rendering (Not supervised by radiologist): MIP and/or 3D reconstructed   images were created by the technologist.   Radiation optimization: All CT scans at this facility use at least one of these   dose optimization techniques: automated exposure control; mA and/or kV   adjustment per patient size (includes targeted exams where dose is matched to   clinical indication); or iterative reconstruction.   Contrast material: ISOVUE; Contrast volume: 100 ml; Contrast route: INTRAVENOUS   (IV);      COMPARISON:   CT HEAD W/O CONTRAST 8/15/2022 9:38 PM     FINDINGS:   Limitations: Suboptimal opacification of dural venous sinuses due to poor   intravenous contrast bolus timing.     Superior sagittal sinus: Patent.   Straight sinus: Patent.   Transverse sinuses:  Minimal opacification of the right transverse sinus, which   may be due to poor contrast bolus timing.  Diminutive left transverse sinus,   compatible with congenital variant anatomy.   Sigmoid sinuses: Bilateral sigmoid sinuses are not well opacified, which may be   due to poor contrast bolus timing.   Internal jugular veins:  Bilateral internal jugular veins are not opacified,   most likely on account of poor contrast bolus timing.   Arteries: Intracranial cerebral arteries all appear patent without evidence of   significant stenosis.   Brain: No abnormally enhancing brain lesions. No mass effect or midline shift.   Intracranial cerebral arteries are all patent without evidence of  significant   stenosis.   Cerebral ventricles: No hydrocephalus.     Soft tissues: Unremarkable.       Impression    IMPRESSION:   No evidence of dural venous sinus thrombosis within the limits of this exam. Of   note, the superior sagittal sinus does appear to be widely patent.    THIS DOCUMENT HAS BEEN ELECTRONICALLY SIGNED BY ERIN RIVERA MD

## 2022-08-16 NOTE — PROGRESS NOTES
Patient Taken to CT with Nurse and RT Patient . Patient Transport to CT and Back Without Incident. Patient remained Stable.

## 2022-08-16 NOTE — ED NOTES
Contacted poison control for potential benzo/SSRI/sleep aid overdose. Recommendations include telemetry/close monitoring for 6 hours, supportive care as needed.

## 2022-08-16 NOTE — ANESTHESIA PROCEDURE NOTES
Lumbar puncture Procedure Note    Pre-Procedure   Staff -        CRNA: Iain Kat APRN CRNA       Performed By: CRNA       Location: ED       Procedure Start/Stop Times: 8/15/2022 11:50 PM and 8/16/2022 12:12 PM       Pre-Anesthestic Checklist: patient identified, IV checked, risks and benefits discussed, informed consent, monitors and equipment checked, pre-op evaluation, at physician/surgeon's request and post-op pain management  Timeout:       Correct Patient: Yes        Correct Procedure: Yes        Correct Site: Yes        Correct Position: Yes   Procedure Documentation  Procedure: lumbar puncture       Diagnosis: somnolence       Patient Position: LLD       Patient Prep/Sterile Barriers: sterile gloves, mask       Skin prep: Chloraprep       Insertion Site: L3-4.       Needle Gauge: 25.        Needle Length (Inches): 3.5        Spinal Needle Type: Pencan       Introducer used       Introducer: 20 G       # of attempts: 2 and  # of redirects:     Assessment/Narrative         Paresthesias: No.       CSF fluid: clear.       Opening pressure was cmH2O while  Lateral.      Medication(s) Administered   Medication Administration Time: 8/15/2022 11:50 PM

## 2022-08-16 NOTE — H&P
"[facility] PSYCHIATRY   HISTORY AND PHYSICAL     ADMISSION DATA     Ilana Aguilar MRN# [id number]   Age: 36 year old YOB: 1985     Date of Admission: 8/16/2022  Primary Physician: No Ref-Primary, Physician        CHIEF COMPLAINT   \"I took all my sleeping pills.\"      HISTORY OF PRESENT ILLNESS     Per ED: Ilana Aguilar is a 36 year old female who presents with obtunded status.  Patient brought by family to the ED for possible drug overdose.  Patient minimally responds to sternal rub by shaking her head and withdrawing slightly to sternal rub.  Per family she may have taken too many sleep medications.  No further history is obtained.      Per DEC: Patient attempted suicide by ingestion of medication. Patient reports that her family and others were in on her losing custody of children today. She reports that her children are better off with her dead. She indicate she uses methamphetamine and alcohol. Methamphetamine is on board and states she used alcohol daily. Patient reports she is in therapy every other week and finds it and medication to be helpful. Patient currently endorses desire to die and indicates \"Everyone being in on it and no one saying anything\" as the reason she no longer has custody of her children who are with her grandparents. \"I just ruin people's lives.\" Patient is tearful, variable eye contact, agitation, and quick short answers, and not fully cooperative as stated due to irritability \"sounds, noises from everywhere.\"    Per patient on admission: Patient notes worsening depression, anxiety and SI over the last 6 months. For the past 2 months she reports worsening auditory hallucinations, which sound more like critical inner voices that tell her she is stupid, ugly, etc. Reports having missed a lot of work recently due to depressive symptoms. Admits to a multitude of stressors including living with her father, child custody issues and recent divorce.  Patient reports " "overdosing on Ambien and admits that it was a suicide attempt. Continues to endorse SI stating she wishes she weren't here.  Agrees to safety plan while she is on the unit. Reports history of methamphetamine and ETOH use which is intermittent.  Denies daily use.  Last reported methamphetamine use: 3 days ago.  Last reported ETOH use: 1 week ago. Denies history of alcohol withdrawal. Denies history of manic episodes. Patient had received a PRN dose of olanzapine 10 mg for auditory hallucinations that were visibly upsetting to patient upon arrival and was noted to be dozing off during this assessment. States \"are we almost done yet? I need to sleep.\" Agreeable to restarting PTA Lexapro and is requesting help for chemical dependency.         PSYCHIATRIC HISTORY     Goes to [facility] for Medication Management and Therapy. Patient is prescribed adderall, lexapro and ambien. Patient reports one prior suicide attempt. Patient denies past inpatient hospitalizations.        SUBSTANCE USE HISTORY     Patient indicates she uses methamphetamine and alcohol.   Patient's utox was methamphetamine positive, positive for amphetamine and patient is prescribed Adderall.        SOCIAL HISTORY     Patient is recently . Has children, but mentioned they are taken away and did not go into detail of why. Lives with her dad and plans on going back there at discharge. Works as  in [place]. Graduated high school, no other education or training. Denies history or abuse and trauma and states she had a good childhood.        FAMILY HISTORY   Family History   Problem Relation Age of Onset     Other - See Comments Mother         with traumatic brain injury,/Psychiatric illness,depression     Substance Abuse Mother         Alcohol/Drug,chemical dependency     Heart Disease Mother         Heart Disease,  of heart failure at age 40.     Unknown/Adopted Father         Unknown,Unknown to patient     Family " History Negative Brother         Good Health     Genetic Disorder No family hx of         Genetic,Denies any family history of cancer, MI or thyroid disorders.      The patient shared her mom dies by SI.      PAST MEDICAL HISTORY   Past Medical History:   Diagnosis Date     Acute vaginitis     History of bacterial vaginosis     Anogenital (venereal) warts     No Comments Provided     Encounter for insertion of intrauterine contraceptive device     14,Paragard Lot# [id number] Exp 2020     Injury of left ankle     Left ankle - denied per patient     Personal history of other medical treatment (CODE)     ,  ( one AB at age 20)     Personal history of urinary calculi     No Comments Provided     Personal history of urinary infection     No Comments Provided     Scoliosis     No Comments Provided     Social phobia     No Comments Provided       Past Surgical History:   Procedure Laterality Date     OTHER SURGICAL HISTORY      [id number],COLPOSCOPY       Patient has no known allergies.     MEDICATIONS   Prior to Admission medications    Medication Sig Start Date End Date Taking? Authorizing Provider   acetaminophen (TYLENOL) 500 MG tablet Take 1,000 mg by mouth daily as needed (headache)   Yes Unknown, Entered By History   ALPRAZolam (XANAX) 0.5 MG tablet Take 0.25-0.5 mg by mouth three times a week - as needed for panic attack   Yes Unknown, Entered By History   amphetamine-dextroamphetamine (ADDERALL) 20 MG tablet Take 20 mg by mouth 3 times daily 22  Yes Reported, Patient   calcium carbonate (TUMS) 500 MG chewable tablet Take 4 chew tab by mouth 4 times daily as needed for heartburn   Yes Unknown, Entered By History   escitalopram (LEXAPRO) 20 MG tablet Take 20 mg by mouth daily   Yes Unknown, Entered By History   fluticasone (FLONASE) 50 MCG/ACT nasal spray Spray 2 sprays into both nostrils daily as needed for allergies   Yes Unknown, Entered By History   loratadine (CLARITIN) 10 MG tablet Take 10 mg by  mouth daily as needed 10/14/14  Yes Reported, Patient   zolpidem (AMBIEN) 5 MG tablet Take 5 mg by mouth At Bedtime    Unknown, Entered By History        PHYSICAL EXAM/ROS     I have reviewed the physical exam as documented by the medical team and agree with findings and assessment and have no additional findings to add at this time. The review of systems is negative other than noted in the HPI.       LABS   Recent Results (from the past 24 hour(s))   HCG qualitative urine    Collection Time: 08/15/22  9:10 PM   Result Value Ref Range    hCG Urine Qualitative Negative Negative   Urine Drugs of Abuse Screen Panel 13    Collection Time: 08/15/22  9:10 PM   Result Value Ref Range    Cannabinoids (41-fpo-0-carboxy-9-THC) Not Detected Not Detected    Phencyclidine Not Detected Not Detected    Cocaine (Benzoylecgonine) Not Detected Not Detected    Methamphetamine (d-Methamphetamine) Presumptive positive-Unconfirmed result (A) Not Detected    Opiates (Morphine) Not Detected Not Detected    Amphetamine (d-Amphetamine) Presumptive positive-Unconfirmed result (A) Not Detected    Benzodiazepines (Nordiazepam) Not Detected Not Detected    Tricyclic Antidepressants (Desipramine) Not Detected Not Detected    Methadone Not Detected Not Detected    Barbiturates (Butalbital) Not Detected Not Detected    Oxycodone Not Detected Not Detected    Propoxyphene (Norpropoxyphene) Not Detected Not Detected    Buprenorphine Not Detected Not Detected   Comprehensive metabolic panel    Collection Time: 08/15/22  9:17 PM   Result Value Ref Range    Sodium 138 134 - 144 mmol/L    Potassium 3.5 3.5 - 5.1 mmol/L    Chloride 103 98 - 107 mmol/L    Carbon Dioxide (CO2) 26 21 - 31 mmol/L    Anion Gap 9 3 - 14 mmol/L    Urea Nitrogen 8 7 - 25 mg/dL    Creatinine 0.94 0.60 - 1.20 mg/dL    Calcium 8.8 8.6 - 10.3 mg/dL    Glucose 114 (H) 70 - 105 mg/dL    Alkaline Phosphatase 47 34 - 104 U/L    AST 15 13 - 39 U/L    ALT 15 7 - 52 U/L    Protein Total 6.7  6.4 - 8.9 g/dL    Albumin 3.9 3.5 - 5.7 g/dL    Bilirubin Total 0.4 0.3 - 1.0 mg/dL    GFR Estimate 81 >60 mL/min/1.73m2   Blood gas venous and oxyhgb    Collection Time: 08/15/22  9:17 PM   Result Value Ref Range    pH Venous 7.40 7.32 - 7.43    pCO2 Venous 42 40 - 50 mm Hg    pO2 Venous 119 (H) 25 - 47 mm Hg    Bicarbonate Venous 26 21 - 28 mmol/L    FIO2 28     Oxyhemoglobin Venous 94 (H) 70 - 75 %    Base Excess/Deficit (+/-) 0.8 -7.7 - 1.9 mmol/L   Ammonia (on ice)    Collection Time: 08/15/22  9:17 PM   Result Value Ref Range    Ammonia 45 16 - 53 umol/L   CBC with platelets and differential    Collection Time: 08/15/22  9:17 PM   Result Value Ref Range    WBC Count 7.6 4.0 - 11.0 10e3/uL    RBC Count 4.60 3.80 - 5.20 10e6/uL    Hemoglobin 13.5 11.7 - 15.7 g/dL    Hematocrit 40.1 35.0 - 47.0 %    MCV 87 78 - 100 fL    MCH 29.3 26.5 - 33.0 pg    MCHC 33.7 31.5 - 36.5 g/dL    RDW 13.2 10.0 - 15.0 %    Platelet Count 357 150 - 450 10e3/uL    % Neutrophils 67 %    % Lymphocytes 22 %    % Monocytes 9 %    % Eosinophils 1 %    % Basophils 1 %    % Immature Granulocytes 0 %    NRBCs per 100 WBC 0 <1 /100    Absolute Neutrophils 5.1 1.6 - 8.3 10e3/uL    Absolute Lymphocytes 1.7 0.8 - 5.3 10e3/uL    Absolute Monocytes 0.7 0.0 - 1.3 10e3/uL    Absolute Eosinophils 0.1 0.0 - 0.7 10e3/uL    Absolute Basophils 0.1 0.0 - 0.2 10e3/uL    Absolute Immature Granulocytes 0.0 <=0.4 10e3/uL    Absolute NRBCs 0.0 10e3/uL   Ethanol GH    Collection Time: 08/15/22  9:17 PM   Result Value Ref Range    Alcohol ethyl <0.01 <=0.01 g/dL   Blood Culture Arm, Right    Collection Time: 08/15/22 11:21 PM    Specimen: Arm, Right; Blood   Result Value Ref Range    Culture No growth after 12 hours    Blood Culture Arm, Right    Collection Time: 08/15/22 11:21 PM    Specimen: Arm, Right; Blood   Result Value Ref Range    Culture No growth after 12 hours    Lactic acid whole blood    Collection Time: 08/15/22 11:21 PM   Result Value Ref Range     "Lactic Acid 0.8 0.7 - 2.0 mmol/L   Glucose CSF:    Collection Time: 08/16/22 12:10 AM   Result Value Ref Range    Glucose CSF 69 40 - 80 mg/dL   Protein total CSF:    Collection Time: 08/16/22 12:10 AM   Result Value Ref Range    Protein total CSF 44 15 - 45 mg/dL   Gram stain    Collection Time: 08/16/22 12:10 AM    Specimen: Lumbar Puncture; Cerebrospinal fluid   Result Value Ref Range    Gram Stain Result No PMNs seen     Gram Stain Result No organisms seen    Cell Count CSF    Collection Time: 08/16/22 12:10 AM   Result Value Ref Range    Tube Number 4     Color Colorless Colorless    Clarity Clear Clear    Total Nucleated Cells 2 0 - 5 /uL    RBC Count 1 0 - 2 /uL   Asymptomatic Influenza A/B & SARS-CoV2 (COVID-19) Virus PCR Multiplex Nose    Collection Time: 08/16/22  7:06 AM    Specimen: Nose; Swab   Result Value Ref Range    Influenza A PCR Negative Negative    Influenza B PCR Negative Negative    RSV PCR Negative Negative    SARS CoV2 PCR Negative Negative         MENTAL STATUS EXAM   Vitals: /78 (BP Location: Right arm)   Pulse 86   Temp 98.7  F (37.1  C) (Temporal)   Resp 16   Ht 1.626 m (5' 4\")   Wt 89.2 kg (196 lb 9.6 oz)   SpO2 98%   BMI 33.75 kg/m      Appearance:  dressed in hospital scrubs, somnolent though easily aroused by voice  Attitude:  guarded  Eye Contact:  fair  Mood:  angry, anxious, sad  and depressed  Affect:  sad  Speech:  clear, coherent  Psychomotor Behavior:  no evidence of tardive dyskinesia, dystonia, or tics  Thought Process:  logical  Associations:  no loose associations  Thought Content:  active suicidal ideation present and auditory hallucinations present  Insight:  fair  Judgment:  limited  Oriented to:  time, person, and place  Attention Span and Concentration:  limited  Recent and Remote Memory:  fair  Language: Able to name objects, Able to repeat phrases and Able to read and write  Fund of Knowledge: appropriate  Muscle Strength and Tone: normal  Gait and " Station: Normal       ASSESSMENT     Patient presents with worsening depression and suicidal thoughts over the last 6 months which led to a suicide attempt yesterday when she overdosed on Ambien. Admits to a multitude of stressors including living with her father, child custody issues and recent divorce, but it sounds like losing custody of her children and feeling teamed up on by her family is what ultimately precipitated her suicide attempt. Symptoms further exacerbated by methamphetamine and alcohol abuse. Continues to endorse SI stating she wishes she weren't here. Agreeable to restarting PTA Lexapro and is requesting help for chemical dependency.         DIAGNOSIS     1. Unspecified depressive disorder  2. Rule out major depressive disorder  3. Rule out substance induced mood disorder  4. Methamphetamine use disorder  5. Alcohol use disorder  5. Generalized anxiety disorder and panic disorder by history        PLAN     Location: Unit 5  Legal Status: Orders Placed This Encounter      Legal status 72 Hour Hold    Safety Assessment:    Behavioral Orders   Procedures     Code 1 - Restrict to Unit     Routine Programming     As clinically indicated     Status 15     Every 15 minutes.      PTA medications held:     -Ambien  -Xanax    PTA medications continued/changed:     -Lexapro 20 mg daily continued    New medications initiated:     -Standard unit PRNs for safety and comfort     Programming: Patient will be treated in a therapeutic milieu with appropriate individual and group therapies. Education will be provided on diagnoses, medications, and treatments.     Medical diagnoses:  Per medicine    Consult/Referrals: Referral for CD assessment  Tests: None    Anticipated LOS: 3-5 days  Disposition: Back home with father        ATTESTATION      La Porter NP

## 2022-08-16 NOTE — ED NOTES
"Pt awake spontaneously.  Reports taking zolpidem.  Pt states \"I wish I would have \".  Pt reports hearing voices of her ex in-laws, threatening her.  PRN medication administered.    Barbara Merino RN.............................2022 4:35 AM    "

## 2022-08-16 NOTE — TELEPHONE ENCOUNTER
Patient cleared and ready for behavioral bed placement: Yes     S: 37 y/o female presented to the [facility] ED after an intentional overdose.    B: Hx substance abuse, ADHD.  Pt has made suicidal statements since being in the ED.  Pt is suspected to have overdosed on sleep medication but it is currently unknown what she took, how much and when she took it.  Pt has not been very communicative with the .  No reported HI or psychosis.  Hx of using meth and alcohol.   reported there are no concerns for withdrawal.      A: Voluntary.   reported pt will be placed on a 72 HH.  Hold has not been ordered as of 0623.   reported pt has been medically cleared.  Per chart review, Poison Control recommended 6 hrs of monitoring.  COVID has not been ordered.  Drug screen positive for methamphetamine, amphetamines.  HCG negative  CBC results within normal range    R: 0655 COVID swab requested.  Pt is not currently on a hold yet.  Case will be passed to days to track at 0700.  Placed on wait list pending bed availability and negative COVID result.

## 2022-08-16 NOTE — ANESTHESIA PREPROCEDURE EVALUATION
Anesthesia Pre-Procedure Evaluation    Patient: Ilana Aguilar   MRN: [id number] : 1985        Procedure : * No procedures listed *          Past Medical History:   Diagnosis Date     Acute vaginitis     History of bacterial vaginosis     Anogenital (venereal) warts     No Comments Provided     Encounter for insertion of intrauterine contraceptive device     14,Paragard Lot# [id number] Exp 2020     Injury of left ankle     Left ankle - denied per patient     Personal history of other medical treatment (CODE)     ,  ( one AB at age 20)     Personal history of urinary calculi     No Comments Provided     Personal history of urinary infection     No Comments Provided     Scoliosis     No Comments Provided     Social phobia     No Comments Provided      Past Surgical History:   Procedure Laterality Date     OTHER SURGICAL HISTORY      [id number],COLPOSCOPY      No Known Allergies   Social History     Tobacco Use     Smoking status: Never Smoker     Smokeless tobacco: Never Used   Substance Use Topics     Alcohol use: No     Alcohol/week: 0.0 standard drinks     Comment: social      Wt Readings from Last 1 Encounters:   08/15/22 97.5 kg (215 lb)        Anesthesia Evaluation            ROS/MED HX  ENT/Pulmonary:       Neurologic:       Cardiovascular:       METS/Exercise Tolerance:     Hematologic:       Musculoskeletal:       GI/Hepatic:       Renal/Genitourinary:       Endo:       Psychiatric/Substance Use: Comment: Panic disorder    (+) psychiatric history anxiety     Infectious Disease:       Malignancy:       Other:     (-) Any chance pregnant          OUTSIDE LABS:  CBC:   Lab Results   Component Value Date    WBC 7.6 08/15/2022    WBC 8.9 2022    HGB 13.5 08/15/2022    HGB 14.7 2022    HCT 40.1 08/15/2022    HCT 44.5 2022     08/15/2022     2022     BMP:   Lab Results   Component Value Date     08/15/2022     2022    POTASSIUM 3.5 08/15/2022     POTASSIUM 3.7 02/09/2022    CHLORIDE 103 08/15/2022    CHLORIDE 104 02/09/2022    CO2 26 08/15/2022    CO2 25 02/09/2022    BUN 8 08/15/2022    BUN 10 02/09/2022    CR 0.94 08/15/2022    CR 0.87 02/09/2022     (H) 08/15/2022    GLC 96 02/09/2022     COAGS: No results found for: PTT, INR, FIBR  POC:   Lab Results   Component Value Date    HCG Negative 08/15/2022     HEPATIC:   Lab Results   Component Value Date    ALBUMIN 3.9 08/15/2022    PROTTOTAL 6.7 08/15/2022    ALT 15 08/15/2022    AST 15 08/15/2022    ALKPHOS 47 08/15/2022    BILITOTAL 0.4 08/15/2022    BLANCA 45 08/15/2022     OTHER:   Lab Results   Component Value Date    LACT 0.8 08/15/2022    COOPER 8.8 08/15/2022    TSH 1.99 02/09/2022    CRP 1.6 02/09/2022    SED 13 02/09/2022       Anesthesia Plan    ASA Status:  2      Anesthesia Type: Spinal.              Consents            Postoperative Care            Comments:    Other Comments: Hx taken from chart as patient is unable to provide ROS/Med Hx d/t somnolence. Awakes to touch but does not follow commands. Unable to obtain formal consent from patient or family. Dr. Garza rules LP as emergent and medically necessary for plan of care.             SUNIL May CRNA

## 2022-08-16 NOTE — ED TRIAGE NOTES
Pt brought to ED by family for possible drug overdose.   Pt uncooperative and not answering triage questions.  Could have possible taken too many sleep meds.

## 2022-08-16 NOTE — SAFE
Ilana Aguilar  August 16, 2022  SAFE Note    Critical Safety Issues: Patient attempted suicide by overdose and has a desire to die. Reports everyone would be better off and states wanting to die for a long time now. Patient is irritable and agitated. On 1:1 as she is unable to remain safe alone.      Current Suicidal Ideation/Self-Injurious Concerns/Methods: Ingestion pills      Current or Historical Inappropriate Sexual Behavior: No      Current or Historical Aggression/Homicidal Ideation: None - N/A      Triggers: substance use, poor self worth, negativistic     Guardianship Status: [facility] Guardian: is her own guardian.. Guardianship paperwork is not required.    This patient is a child/adolescent: No    This patient has additional special visitor precautions: No    Updated care team: Yes: Dr Garza    For additional details see full [facility] assessment.       KAY Arteaga    \

## 2022-08-16 NOTE — PLAN OF CARE
"ADMISSION NOTE    Reason for admission:  Suicide attempt by intentional overdose.  Safety concerns:  Suicide risk.  Risk for or history of violence: none noted at time of admission.   Full skin assessment: Left and right antecubital areas reddened and have bandages from IV sight and lab draws. When BP was attempted on left arm, the sight that IV was removed from PTA started to bleed.  Arm elevated and pressure applied.  Bleeding soon stopped. New cotton ball and tape applied to both left and right antecubital areas.     Patient arrived on unit from [facility] emergency room accompanied by secure transport and security on 8/16/2022  1:17 PM.   Status on arrival: Cooperative, tearful, anxious.  Pulse 86   Temp 98.7  F (37.1  C) (Temporal)   Resp 16   Ht 1.626 m (5' 4\")   Wt 89.2 kg (196 lb 9.6 oz)   SpO2 98%   BMI 33.75 kg/m   manual BP right arm 117/78  Patient given tour of unit and Welcome to  unit papers given to patient, wanding completed, belongings inventoried, and admission assessment completed.   Patient's legal status on arrival is 72 hour hold. Appropriate legal rights discussed with and copy given to patient. Patient Bill of Rights discussed with and copy given to patient.   Patient denies SI, HI, and thoughts of self harm and contracts for safety while on unit.      Meli Jo RN  8/16/2022 @ 1:17 PM    Patient reports taking prescription medications as a suicide attempt.  She reports inability to handle auditory hallucinations.  \"They tell me everything I'm doing wrong. They tell me I'm a bad person.  I can't always tell if they are real or not.\" She reports auditory hallucinations, anxiety, and stress have worsened in the last year.  Patient states \"I don't regret what I did. I wish I weren't here.\"  Patient agrees to safety plan while she is on the unit.  Patient is tearful and stares off into the distance at times during admission. Reports history of methamphetamine and ETOH use " which is intermittent.  Denies daily use.  Last reported methamphetamine use: 3 days ago.  Last reported ETOH use: 1 week ago. Denies history of withdrawal seizures.   MIRELLA signed for and faxed to Nancy Gillette (medication provider) in [place].   1355:  PRN olanzapine 10 mg po administered r/t auditory hallucinations that were visibly upsetting to patient.  She received PRN olanzapine in the emergency room this morning which she found helpful.  She agreed to PRN olanzapine at this time.

## 2022-08-16 NOTE — PLAN OF CARE
Face to face end of shift report received from Meli CONTRERAS RN. Rounding completed and patient observed lying in bed with eyes closed, breathing regular and unlabored.     Goal Outcome Evaluation: Patient slept almost the entire shift. She woke up for vital signs but did not eat dinner. She was noted to change positions multiple times. She appeared to sleep comfortably and showed no distress in her facial expressions or body postioning. At one point patient was prompted by staff to wake up and drink fluids. She was educated that she needed to increase her fluid intake because she had a CT scan with contrast. She reported she didn't have any recollection of having a CT scan. Patient was observed to drink a large glass of ice water and ate a snack. She was also brought extra juices. After this she laid down and went back to sleep.     Face to face end of shift report communicated to oncoming RN.      Problem: Behavioral Health Plan of Care  Goal: Adheres to Safety Considerations for Self and Others  Outcome: Ongoing, Not Progressing     Problem: Thought Process Alteration  Goal: Optimal Thought Clarity  Outcome: Ongoing, Not Progressing     Problem: Suicide Risk  Goal: Absence of Self-Harm  Outcome: Ongoing, Not Progressing     Problem: Suicidal Behavior  Goal: Suicidal Behavior is Absent or Managed  Description: Patient will contract for safety if having suicidal ideations while on the unit.   Patient will report absence of suicidal ideations prior to discharge.   Outcome: Ongoing, Progressing

## 2022-08-16 NOTE — TELEPHONE ENCOUNTER
Inpatient Bed Call Log 8/16/2022 at 7:30a  Adults:                 [facility] is posting 0 beds.                  [facility] is posting 0 beds.                 [facility] is posting 0 beds.                 [facility] is posting 0 beds.                 [facility] is posting 0 beds.                 [facility] is posting 0 beds.                 [facility] is posting 0 beds.                 [facility] is posting 0 beds.                    [facility] is posting 0 beds. Mixed unit 12+. Low acuity only.                  [facility] is positing 0 beds. No aggression.                  [facility] is posting 0 beds.                  [facility] is posting 0 beds. Low acuity only.                 [facility] is posting 0 beds.                 [facility] is posting 0 beds. Low acuity.                  [facility] is posting 1 bed. 72 hr hold required.                  [facility] is posting 0 beds.                     [facility] is posting 0 beds. Vol only, No Hx of aggression, violence or assault. No sexual offenders. No 72 hr holds.                 [facility] is posting 3 beds. (Must have the cognitive ability to do programming. No aggressive or violent behavior or recent HX in the last 2 yrs. MH must be primary.)                 [facility] is posting 0 beds. Low acuity only. Violence and aggression capped.                  [facility] is posting 0 beds. Low acuity, Neg Covid.                  [facility] is posting 0 bed. Covid neg. Vol only. Combined adolescent and adult unit. No aggressive or violent behavior. No registered sex offenders.                  [facility] is [facility] 2 beds. Call for details.                 [facility] is posting 1 bed.     8:10am No appropriate bed available at this time.     R: Patient cleared and ready for behavioral bed placement: Yes    9:33am Intake  called [facility] provider Olivia, and provided disposition. Provider has follow-up questions: Is she awake, able to walk? How irritable and agitated is she?     9:38am Intake called [facility] and spoke with pt's nurse, Mauri. Pt has not been irritated/agitated this morning. Ambulating independent. Has been pleasant. Eating fine.     10:03am intake called [facility] provider Olivia. Pt was accepted for placement at [facility] [address].    10:16am Intake called [facility]. Charge nurse is in a meeting and will call intake back.     11:16am Intake called [place] [phone] and spoke with charge nurse, Kurtis. Nurse Report: Anytime.      11:19am Intake received a call from [facility]. Intake provided placement information to nurseMiranda.

## 2022-08-16 NOTE — CARE PLAN
Medication reconciliation completed by [facility] at sending facility- reviewed notes and double checked PTA meds- no concerns.     Went over meds with patient:    Pt takes OTC claritin PRN- updated    Confirmed NKDA    Input last times taken.    Pt reports overdosing on zolpidem Sunday night.     Pt manages her own medications and is compliant.     Yuni Berger on 8/16/2022 at 3:06 PM      Medication Dispense History (from 8/16/2021 to 8/16/2022)  Expand All  Collapse All    ALPRAZolam     Dispensed Days Supply Quantity Provider Pharmacy   ALPRAZOLAM 0.5MG TABLETS 07/28/2022 30 12 Units Shannan Pastor, [facility]...   ALPRAZOLAM 0.5MG TABLETS 06/17/2022 30 12 Units Shannan Pastor, [facility]...        Amphetamine-Dextroamphetamine     Dispensed Days Supply Quantity Provider Pharmacy   D-AMPHETAMINE SALT COMBO 20MG TABS 07/25/2022 30 90 Units Shannan Pastor, [facility]...   D-AMPHETAMINE SALT COMBO 20MG TABS 06/16/2022 30 90 Units Shannan Pastor, [facility]...        Azelastine HCl     Dispensed Days Supply Quantity Provider Pharmacy   AZELASTINE 0.1%(137MCG) NASAL-200SP 09/27/2021 30 30 mL TAYLA MENDOZA [facility]...        Cetirizine-Pseudoephedrine     Dispensed Days Supply Quantity Provider Pharmacy   WAL-ZYR D TABLETS 09/27/2021 3 24 Units TAYLA [facility]ENDOZA [facility]...        Clarithromycin     Dispensed Days Supply Quantity Provider Pharmacy   CLARITHROMYCIN 500MG TABLETS 01/20/2022 10 20 Units ANISA DOWD [facility]...        Clindamycin HCl     Dispensed Days Supply Quantity Provider Pharmacy   CLINDAMYCIN 300MG CAPSULES 12/02/2021 10 30 Units ANISA DOWD [facility]...   CLINDAMYCIN 300MG CAPSULES 09/27/2021 10 40 Units TAYLA MENDOZA [facility]...        Clotrimazole     Dispensed Days Supply Quantity  Provider Pharmacy   CLOTRIMAZOLE 1% CREAM (OTC) 30GM 02/09/2022 15 30 g Savannah Alcaraz PA-C [facility]        Escitalopram Oxalate     Dispensed Days Supply Quantity Provider Pharmacy   ESCITALOPRAM 20MG TABLETS 08/09/2022 30 30 Units Shannan Pastor NP [facility]   ESCITALOPRAM 5MG TABLETS 07/26/2022 30 30 Units [facility]   ESCITALOPRAM 5MG TABLETS 06/27/2022 30 30 Units [facility]        Fluconazole     Dispensed Days Supply Quantity Provider Pharmacy   FLUCONAZOLE 150MG TABLETS 09/27/2021 1 1 Units TAYLA MENDOZA [facility] #...        Fluticasone Propionate     Dispensed Days Supply Quantity Provider Pharmacy   FLUTICASONE 50MCG NASAL SP (120) RX 11/08/2021 30 16 g ANISA DWOD [facility]...   FLUTICASONE 50MCG NASAL SP (120) RX 09/27/2021 30 16 g [facility]        Mometasone Furoate     Dispensed Days Supply Quantity Provider Pharmacy   MOMETASONE 0.1% TOP SOLN/LOT 30ML 12/02/2021 15 60 mL ANISA DOWD [facility]...        Tobramycin-Dexamethasone     Dispensed Days Supply Quantity Provider Pharmacy   TOBRAMYCIN/DEXAMETHASONESUSP 5ML 11/08/2021 11 5 mL ANISA DOWD [facility] #...        Zolpidem Tartrate     Dispensed Days Supply Quantity Provider Pharmacy   ZOLPIDEM 5MG TABLETS 08/09/2022 30 30 Units Shannan Pastor NP [facility]        metroNIDAZOLE     Dispensed Days Supply Quantity Provider Pharmacy   METRONIDAZOLE 500MG TABLETS 02/10/2022 7 14 Units Savannah Alcaraz PA-C [facility]...

## 2022-08-17 PROCEDURE — 99232 SBSQ HOSP IP/OBS MODERATE 35: CPT | Performed by: NURSE PRACTITIONER

## 2022-08-17 PROCEDURE — 250N000013 HC RX MED GY IP 250 OP 250 PS 637: Performed by: NURSE PRACTITIONER

## 2022-08-17 PROCEDURE — 124N000001 HC R&B MH

## 2022-08-17 RX ORDER — BUSPIRONE HYDROCHLORIDE 5 MG/1
5 TABLET ORAL 3 TIMES DAILY
Status: DISCONTINUED | OUTPATIENT
Start: 2022-08-17 | End: 2022-08-19

## 2022-08-17 RX ADMIN — HYDROXYZINE HYDROCHLORIDE 25 MG: 25 TABLET, FILM COATED ORAL at 20:34

## 2022-08-17 RX ADMIN — BUSPIRONE HYDROCHLORIDE 5 MG: 5 TABLET ORAL at 20:35

## 2022-08-17 RX ADMIN — BUSPIRONE HYDROCHLORIDE 5 MG: 5 TABLET ORAL at 16:59

## 2022-08-17 RX ADMIN — ESCITALOPRAM OXALATE 20 MG: 10 TABLET ORAL at 08:31

## 2022-08-17 RX ADMIN — LORATADINE 10 MG: 10 TABLET ORAL at 08:31

## 2022-08-17 ASSESSMENT — ACTIVITIES OF DAILY LIVING (ADL)
ADLS_ACUITY_SCORE: 28
HYGIENE/GROOMING: INDEPENDENT
ADLS_ACUITY_SCORE: 28
ORAL_HYGIENE: INDEPENDENT
ADLS_ACUITY_SCORE: 28
DRESS: SCRUBS (BEHAVIORAL HEALTH);INDEPENDENT
ADLS_ACUITY_SCORE: 28
ADLS_ACUITY_SCORE: 28
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
## 2022-08-17 NOTE — PROGRESS NOTES
"CLINICAL NUTRITION SERVICES  -  ASSESSMENT NOTE    Ilana Aguilar : Admission Nutrition Risk Screen - unsure of weight loss    36 yof admitted for suicidal ideation. Past medical hx including substance abuse, methamphetamine, alcohol. Noted weight loss of 20lbs since February 2022. Good appetite/intake for 1 meal this admission.     Diet Order: Regular  Intake: 1 meal with 100% intake    Height: 5' 4\"  Weight: 196 lbs 9.6 oz  Body mass index is 33.75 kg/m .  Weight Status:  Obesity Grade I BMI 30-34.9  Weight History:   Wt Readings from Last 10 Encounters:   08/16/22 89.2 kg (196 lb 9.6 oz)   08/15/22 97.5 kg (215 lb)   02/09/22 98.1 kg (216 lb 3.2 oz)   06/09/19 86.1 kg (189 lb 12.8 oz)   04/06/18 84 kg (185 lb 1.6 oz)   09/13/16 98.4 kg (217 lb)   08/18/16 97.5 kg (215 lb)   04/06/16 96.8 kg (213 lb 6.4 oz)   08/19/15 92.2 kg (203 lb 3.2 oz)   10/30/14 88.4 kg (194 lb 12.8 oz)      Malnutrition Diagnosis: Unable to determine. Limited weights to determine exact timeframe of weight loss. Possible sub-optimal nutrition status.    NUTRITION RECOMMENDATIONS  - Encourage intake during meal and snack times  - Offer Ensure when appetite is poor  - May benefit from a daily multivitamin/mineral supplement    MONITORING AND EVALUATION:  RD will monitor intake, weight, labs                "

## 2022-08-17 NOTE — PROGRESS NOTES
"[facility] PSYCHIATRY  PROGRESS NOTE     SUBJECTIVE     Prior to interviewing the patient, I met with nursing and reviewed patient's clinical condition. We discussed clinical care both before and after the interview. I have reviewed the patient's clinical course by review of records including previous notes, labs, and vital signs.     Per nursing, the patient had the following behavioral events over the last 24-hours: none    On psychiatric interview, patient resting in her bed. Reports ongoing depression rated an 8 out of 10. Denies active SI, but states she still wishes she would have . Also notes anxiety rated a 10 out 10. States Adderall and Xanax has been helpful for her in the past. Discussed desire to avoid controlled medication given patient's significant CD history. Has been on Lexapro for a couple months, only 2 weeks at current dose of 20 mg daily. Despite patient's current report of unmanageable depression and anxiety, patient believes her Lexapro has been helpful. Agreeable to a trail of Buspar in attempt to augment her Lexapro. Completed a Rule 25 assessment over the phone today and hopeful to get into CD treatment soon. Eating and sleeping OK.           MEDICATIONS   Scheduled Meds:    escitalopram  20 mg Oral Daily     loratadine  10 mg Oral Daily     PRN Meds:.acetaminophen, alum & mag hydroxide-simethicone, calcium carbonate, fluticasone, hydrOXYzine, melatonin, OLANZapine **OR** OLANZapine, senna-docusate     ALLERGIES   No Known Allergies     MENTAL STATUS EXAM   Vitals: /73   Pulse 85   Temp 97.8  F (36.6  C) (Temporal)   Resp 16   Ht 1.626 m (5' 4\")   Wt 89.2 kg (196 lb 9.6 oz)   SpO2 96%   BMI 33.75 kg/m      Appearance:  dressed in hospital scrubs  Attitude:  cooperative  Eye Contact:  fair  Mood:  angry, anxious, sad  and depressed  Affect:  flat, depressed  Speech:  clear, coherent  Psychomotor Behavior:  no evidence of tardive dyskinesia, dystonia, or tics  Thought " Process:  logical  Associations:  no loose associations  Thought Content:  active suicidal ideation present and auditory hallucinations present  Insight:  fair  Judgment:  limited  Oriented to:  time, person, and place  Attention Span and Concentration:  limited  Recent and Remote Memory:  fair  Language: Able to name objects, Able to repeat phrases and Able to read and write  Fund of Knowledge: appropriate  Muscle Strength and Tone: normal  Gait and Station: Normal     LABS   No results found for this or any previous visit (from the past 24 hour(s)).      IMPRESSION     Patient presents with worsening depression and suicidal thoughts over the last 6 months which led to a suicide attempt yesterday when she overdosed on Ambien. Admits to a multitude of stressors including living with her father, child custody issues and recent divorce, but it sounds like losing custody of her children and feeling teamed up on by her family is what ultimately precipitated her suicide attempt. Symptoms further exacerbated by methamphetamine and alcohol abuse. Continues to endorse SI stating she wishes she weren't here. Agreeable to restarting PTA Lexapro and is requesting help for chemical dependency       DIAGNOSES     1. Unspecified depressive disorder  2. Rule out major depressive disorder  3. Rule out substance induced mood disorder  4. Methamphetamine use disorder  5. Alcohol use disorder  5. Generalized anxiety disorder and panic disorder by history        PLAN     Location: Unit 5  Legal Status: Orders Placed This Encounter      Legal status 72 Hour Hold    Safety Assessment:    Behavioral Orders   Procedures     Code 1 - Restrict to Unit     Routine Programming     As clinically indicated     Status 15     Every 15 minutes.      PTA medications continued/changed:     -Lexapro 20 mg daily continued    New medications tried and stopped:     -None    New medications initiated:     --Standard unit PRNs for safety and comfort      Today's Changes:    - Start Buspar 5 mg TID    Programming: Patient will be treated in a therapeutic milieu with appropriate individual and group therapies. Education will be provided on diagnoses, medications, and treatments.     Medical diagnoses:  Per medicine     Consult/Referrals: Referral for CD assessment  Tests: None     Anticipated LOS: 3-5 days  Disposition: Back home with father vs. CD treatment - awaiting on Rule 25 recs     ATTESTATION      La Porter NP

## 2022-08-17 NOTE — PLAN OF CARE
Problem: Behavioral Health Plan of Care  Goal: Patient-Specific Goal (Individualization)  Description: Patient will report at least 6-8 hours of sleep each night.   Patient will participate in at least 50% of group programming while on the unit.   Patient will complete all ADL's independently while on the unit.   Patient will be compliant with treatment team recommendations.   Outcome: Ongoing, Progressing  Note: [facility] Rounding complete. Pt observed sleeping in left side lying position with regular and unlabored respirations.    Pt has been in bed with eyes closed and regular respirations. 15 minute and PRN checks all night. No complaints offered. Will continue to monitor.    Pt slept approx  8  hours this NOC shift.    Face to face end of shift report communicated to oncoming RN.    Shilpa HALL RN  August 17, 2022  4:06 AM          Problem: Suicidal Behavior  Goal: Suicidal Behavior is Absent or Managed  Description: Patient will contract for safety if having suicidal ideations while on the unit.   Patient will report absence of suicidal ideations prior to discharge.   Outcome: Ongoing, Progressing  Note: Unable to assess due to pt sleeping. Pt has remained free of self-harm.       Problem: Thought Process Alteration  Goal: Optimal Thought Clarity  Outcome: Ongoing, Progressing  Note: Unable to assess due to pt sleeping. No issues or concerns noted at this time.       Problem: Suicide Risk  Goal: Absence of Self-Harm  Outcome: Ongoing, Progressing   Goal Outcome Evaluation:

## 2022-08-17 NOTE — PLAN OF CARE
1:1 time with the patient.  No changes made to the discharge plan at this time.   See the AVS for follow up appointments and recommendations.     SYDNEE spoke to the patient. She requested that SW schedule a Rule 25 assessment and she wanted to go to treatment.    SYDNEE scheduled a Rule 25 with [facility]  for Today 8/17/2022 @ 2:00 pm. SYDNEE updated nursing staff.

## 2022-08-18 PROCEDURE — 250N000013 HC RX MED GY IP 250 OP 250 PS 637: Performed by: NURSE PRACTITIONER

## 2022-08-18 PROCEDURE — 124N000001 HC R&B MH

## 2022-08-18 RX ADMIN — BUSPIRONE HYDROCHLORIDE 5 MG: 5 TABLET ORAL at 20:58

## 2022-08-18 RX ADMIN — BUSPIRONE HYDROCHLORIDE 5 MG: 5 TABLET ORAL at 13:33

## 2022-08-18 RX ADMIN — ESCITALOPRAM OXALATE 20 MG: 10 TABLET ORAL at 08:19

## 2022-08-18 RX ADMIN — LORATADINE 10 MG: 10 TABLET ORAL at 08:19

## 2022-08-18 RX ADMIN — HYDROXYZINE HYDROCHLORIDE 25 MG: 25 TABLET, FILM COATED ORAL at 20:58

## 2022-08-18 RX ADMIN — BUSPIRONE HYDROCHLORIDE 5 MG: 5 TABLET ORAL at 08:19

## 2022-08-18 RX ADMIN — BENZOCAINE AND MENTHOL 1 LOZENGE: 15; 3.6 LOZENGE ORAL at 16:03

## 2022-08-18 ASSESSMENT — ACTIVITIES OF DAILY LIVING (ADL)
ADLS_ACUITY_SCORE: 28
ORAL_HYGIENE: INDEPENDENT
DRESS: SCRUBS (BEHAVIORAL HEALTH);INDEPENDENT
ADLS_ACUITY_SCORE: 28
HYGIENE/GROOMING: INDEPENDENT

## 2022-08-18 NOTE — PLAN OF CARE
"Problem: Behavioral Health Plan of Care  Goal: Patient-Specific Goal (Individualization)  Description: Patient will report at least 6-8 hours of sleep each night.   Patient will participate in at least 50% of group programming while on the unit.   Patient will complete all ADL's independently while on the unit.   Patient will be compliant with treatment team recommendations.   Outcome: Ongoing, Progressing  Note: Pt has been resting in bed all shift. She continues to endorse anxiety and depression. She denied suicidal ideation. She reported that PTA she \"overdosed on Ambien, 30 pills.\" She discussed how she has been feeling \"overwhelmed with everything.\" Pt identified stressors as: her recent divorce, missing the \"everyday routine\" with her two daughters who are currently living with their father, living with her dad in [place], her job, and trying to find a house. She discussed how she completed a Rule 25 today. She is hoping to get into inpatient treatment for alcohol and meth. She verbalized that she is open to going anywhere except [place] because that is where she works. Pt ate 100% of supper.     2034 - Pt requested and received 25 mg of PRN Hydroxyzine for anxiety. She was compliant with her scheduled medication.     Face to face end of shift report communicated to oncoming RN.      Problem: Suicidal Behavior  Goal: Suicidal Behavior is Absent or Managed  Description: Patient will contract for safety if having suicidal ideations while on the unit.   Patient will report absence of suicidal ideations prior to discharge.   Outcome: Ongoing, Progressing  Note: Pt denied suicidal ideation. She remained free from self harm.      Problem: Thought Process Alteration  Goal: Optimal Thought Clarity  Outcome: Ongoing, Progressing  Note: Pt had a logical, linear, and goal oriented thought process. She denied hallucinations.      Goal Outcome Evaluation:    Plan of Care Reviewed With: patient                 "

## 2022-08-18 NOTE — PLAN OF CARE
Problem: Behavioral Health Plan of Care  Goal: Patient-Specific Goal (Individualization)  Description: Patient will report at least 6-8 hours of sleep each night.   Patient will participate in at least 50% of group programming while on the unit.   Patient will complete all ADL's independently while on the unit.   Patient will be compliant with treatment team recommendations.   Outcome: Ongoing, Progressing  Note: Report received from Cynthia. Rounding complete. Pt observed sleeping in left side lying position with regular and unlabored respirations.    Pt has been in bed with eyes closed and regular respirations. 15 minute and PRN checks all night. No complaints offered. Will continue to monitor.    Pt slept approx    hours this NOC shift.    Face to face end of shift report communicated to oncoming RN.    Shilpa HALL RN  August 18, 2022  4:14 AM          Problem: Suicidal Behavior  Goal: Suicidal Behavior is Absent or Managed  Description: Patient will contract for safety if having suicidal ideations while on the unit.   Patient will report absence of suicidal ideations prior to discharge.   Outcome: Ongoing, Progressing  Note: Unable to assess due to pt sleeping. Pt has remained free of self-harm.       Problem: Thought Process Alteration  Goal: Optimal Thought Clarity  Outcome: Ongoing, Progressing  Note: Unable to assess due to pt sleeping. No issues or concerns noted at this time.       Problem: Suicide Risk  Goal: Absence of Self-Harm  Outcome: Ongoing, Progressing   Goal Outcome Evaluation:

## 2022-08-18 NOTE — PLAN OF CARE
"Problem: Behavioral Health Plan of Care  Goal: Patient-Specific Goal (Individualization)  Description: Patient will report at least 6-8 hours of sleep each night.   Patient will participate in at least 50% of group programming while on the unit.   Patient will complete all ADL's independently while on the unit.   Patient will be compliant with treatment team recommendations.   Outcome: Ongoing, Progressing  Note: Pt observed resting in bed at the start of the shift. She c/o cold symptoms - headache, sore throat, cough, and body aches. She voiced that she feels \"tired.\" 1603 - She was given a cepacol lozenge per her request. Informed pt that she can have one ever hour as needed. She continues to endorse anxiety and depression. She denied suicidal ideation. 2058 - Pt requested and received 25 mg of PRN Hydroxyzine for anxiety. She was compliant with her scheduled medication. Pt spent all shift resting in bed.     Face to face end of shift report communicated to oncoming RN.      Problem: Suicidal Behavior  Goal: Suicidal Behavior is Absent or Managed  Description: Patient will contract for safety if having suicidal ideations while on the unit.   Patient will report absence of suicidal ideations prior to discharge.   Outcome: Ongoing, Progressing  Note: Pt denied suicidal ideation. She remained free from self harm.      Problem: Thought Process Alteration  Goal: Optimal Thought Clarity  Outcome: Ongoing, Progressing  Note: Pt had a logical, goal oriented thought process. She denied hallucinations.      Goal Outcome Evaluation:    Plan of Care Reviewed With: patient                 "

## 2022-08-18 NOTE — PLAN OF CARE
Face to face shift report received from Shilpa TRUJILLO RN.       Problem: Behavioral Health Plan of Care  Goal: Patient-Specific Goal (Individualization)  Description: Patient will report at least 6-8 hours of sleep each night.   Patient will participate in at least 50% of group programming while on the unit.   Patient will complete all ADL's independently while on the unit.   Patient will be compliant with treatment team recommendations.   Outcome: Ongoing, Progressing   Cooperative. Medication compliant. Reports depression and anxiety. Denies thoughts of harming self or others. Flat affect. Pleasant during conversation. Withdrawn to room. No reports of pain. Reports cold symptoms.     Problem: Suicidal Behavior  Goal: Suicidal Behavior is Absent or Managed  Description: Patient will contract for safety if having suicidal ideations while on the unit.   Patient will report absence of suicidal ideations prior to discharge.   Outcome: Ongoing, Progressing   Free from self harm or injury this shift.       Face to face end of shift report to be communicated to oncoming RN.

## 2022-08-19 LAB
BASOPHILS # BLD AUTO: 0 10E3/UL (ref 0–0.2)
BASOPHILS NFR BLD AUTO: 1 %
EOSINOPHIL # BLD AUTO: 0.1 10E3/UL (ref 0–0.7)
EOSINOPHIL NFR BLD AUTO: 1 %
ERYTHROCYTE [DISTWIDTH] IN BLOOD BY AUTOMATED COUNT: 13.1 % (ref 10–15)
FLUAV RNA SPEC QL NAA+PROBE: NEGATIVE
FLUBV RNA RESP QL NAA+PROBE: NEGATIVE
GROUP A STREP BY PCR: NOT DETECTED
HCT VFR BLD AUTO: 40.3 % (ref 35–47)
HGB BLD-MCNC: 13.4 G/DL (ref 11.7–15.7)
HOLD SPECIMEN: NORMAL
HOLD SPECIMEN: NORMAL
IMM GRANULOCYTES # BLD: 0 10E3/UL
IMM GRANULOCYTES NFR BLD: 1 %
LYMPHOCYTES # BLD AUTO: 1.6 10E3/UL (ref 0.8–5.3)
LYMPHOCYTES NFR BLD AUTO: 20 %
MCH RBC QN AUTO: 29.4 PG (ref 26.5–33)
MCHC RBC AUTO-ENTMCNC: 33.3 G/DL (ref 31.5–36.5)
MCV RBC AUTO: 88 FL (ref 78–100)
MONOCYTES # BLD AUTO: 0.6 10E3/UL (ref 0–1.3)
MONOCYTES NFR BLD AUTO: 7 %
NEUTROPHILS # BLD AUTO: 6 10E3/UL (ref 1.6–8.3)
NEUTROPHILS NFR BLD AUTO: 70 %
NRBC # BLD AUTO: 0 10E3/UL
NRBC BLD AUTO-RTO: 0 /100
PLATELET # BLD AUTO: 298 10E3/UL (ref 150–450)
RBC # BLD AUTO: 4.56 10E6/UL (ref 3.8–5.2)
RSV RNA SPEC NAA+PROBE: NEGATIVE
SARS-COV-2 RNA RESP QL NAA+PROBE: NEGATIVE
WBC # BLD AUTO: 8.3 10E3/UL (ref 4–11)

## 2022-08-19 PROCEDURE — 36415 COLL VENOUS BLD VENIPUNCTURE: CPT | Performed by: NURSE PRACTITIONER

## 2022-08-19 PROCEDURE — 99231 SBSQ HOSP IP/OBS SF/LOW 25: CPT | Performed by: NURSE PRACTITIONER

## 2022-08-19 PROCEDURE — 250N000013 HC RX MED GY IP 250 OP 250 PS 637: Performed by: NURSE PRACTITIONER

## 2022-08-19 PROCEDURE — 124N000001 HC R&B MH

## 2022-08-19 PROCEDURE — 99232 SBSQ HOSP IP/OBS MODERATE 35: CPT | Performed by: NURSE PRACTITIONER

## 2022-08-19 PROCEDURE — 87637 SARSCOV2&INF A&B&RSV AMP PRB: CPT | Performed by: NURSE PRACTITIONER

## 2022-08-19 PROCEDURE — 87651 STREP A DNA AMP PROBE: CPT | Performed by: NURSE PRACTITIONER

## 2022-08-19 PROCEDURE — 85041 AUTOMATED RBC COUNT: CPT | Performed by: NURSE PRACTITIONER

## 2022-08-19 PROCEDURE — 85014 HEMATOCRIT: CPT | Performed by: NURSE PRACTITIONER

## 2022-08-19 RX ORDER — BUSPIRONE HYDROCHLORIDE 10 MG/1
10 TABLET ORAL 3 TIMES DAILY
Status: DISCONTINUED | OUTPATIENT
Start: 2022-08-19 | End: 2022-08-22 | Stop reason: HOSPADM

## 2022-08-19 RX ORDER — FLUTICASONE PROPIONATE 50 MCG
2 SPRAY, SUSPENSION (ML) NASAL DAILY
Status: DISCONTINUED | OUTPATIENT
Start: 2022-08-19 | End: 2022-08-22 | Stop reason: HOSPADM

## 2022-08-19 RX ADMIN — BUSPIRONE HYDROCHLORIDE 5 MG: 5 TABLET ORAL at 08:24

## 2022-08-19 RX ADMIN — ESCITALOPRAM OXALATE 20 MG: 10 TABLET ORAL at 08:24

## 2022-08-19 RX ADMIN — BUSPIRONE HYDROCHLORIDE 10 MG: 10 TABLET ORAL at 13:38

## 2022-08-19 RX ADMIN — HYDROXYZINE HYDROCHLORIDE 25 MG: 25 TABLET, FILM COATED ORAL at 21:12

## 2022-08-19 RX ADMIN — LORATADINE 10 MG: 10 TABLET ORAL at 08:24

## 2022-08-19 RX ADMIN — BUSPIRONE HYDROCHLORIDE 10 MG: 10 TABLET ORAL at 21:12

## 2022-08-19 RX ADMIN — FLUTICASONE PROPIONATE 2 SPRAY: 50 SPRAY, METERED NASAL at 13:38

## 2022-08-19 ASSESSMENT — ACTIVITIES OF DAILY LIVING (ADL)
ADLS_ACUITY_SCORE: 28
ORAL_HYGIENE: INDEPENDENT
ADLS_ACUITY_SCORE: 28
DRESS: INDEPENDENT
DRESS: SCRUBS (BEHAVIORAL HEALTH);INDEPENDENT
ORAL_HYGIENE: INDEPENDENT
LAUNDRY: UNABLE TO COMPLETE
ADLS_ACUITY_SCORE: 28
HYGIENE/GROOMING: INDEPENDENT
ADLS_ACUITY_SCORE: 28
HYGIENE/GROOMING: INDEPENDENT
ADLS_ACUITY_SCORE: 28
ADLS_ACUITY_SCORE: 28

## 2022-08-19 NOTE — PLAN OF CARE
Spoke with staff from [facility] - the assessment is complete and they are recommending inpatient MI/CD - they did discuss options with the patient and need her to sign releases - asked them to forward the releases and we would get things going.  Did not hear back from them so was able to access on the charge nurse laptop and patient signed and submitted the forms.  Will wait for follow up with them.

## 2022-08-19 NOTE — PLAN OF CARE
"Problem: Behavioral Health Plan of Care  Goal: Patient-Specific Goal (Individualization)  Description: Patient will report at least 6-8 hours of sleep each night.   Patient will participate in at least 50% of group programming while on the unit.   Patient will complete all ADL's independently while on the unit.   Patient will be compliant with treatment team recommendations.   Outcome: Ongoing, Progressing  Note: Pt has been isolative and withdrawn to her room all shift. She verbalized that she feels \"tired\". She continues to endorse anxiety and depression. She denied suicidal ideation. She ate 100% of supper.     Face to face end of shift report communicated to oncoming RN.      Problem: Suicidal Behavior  Goal: Suicidal Behavior is Absent or Managed  Description: Patient will contract for safety if having suicidal ideations while on the unit.   Patient will report absence of suicidal ideations prior to discharge.   Outcome: Ongoing, Progressing  Note: Pt denied suicidal ideation. She remained free from self harm.      Problem: Thought Process Alteration  Goal: Optimal Thought Clarity  Outcome: Ongoing, Progressing  Note: Pt denied hallucinations. She answered questions appropriately.     Goal Outcome Evaluation:    Plan of Care Reviewed With: patient                 "

## 2022-08-19 NOTE — PROGRESS NOTES
[facility]    Medical Services Progress Note    Date of Service (when I saw the patient): 08/19/2022    Assessment & Plan      Principal Problem:    Suicidal ideation    Active Medical Problems:    Sore throat- pt reports sore throat and nasal congestion x 2 days. She denies headache, ear pain, nausea, vomiting, diarrhea. Covid, influenza, RSV all negative, Strep- negative, CBC wnl. Afebrile. Vitals stable. Likely viral and will resolved in a few days. Tylenol prn. Flonase, Claritin, Cepacol lozenges. Gatorade with meal trays. Nursing to monitor for new or worsening symptoms.      ED course- Head CT showed Relatively symmetric blurring of gray-white matter differentiation in the bilateral anterior frontal lobes. Findings are nonspecific, but can be seen with acute concussive type traumatic brain injury, viral encephalitis (i.e. herpes encephalitis), metabolic and/or drug-induced neurotoxicity, infiltrative  neoplasm, cerebral venous thrombosis with potential subacute stroke. Please correlate with patient history and physical exam. Pt was prophylactically on broad-spectrum antibiotics and acyclovir for possible CNS infection. LP was performed and unremarkable. Some labs are still in process. Blood cultures preliminary shows no growth.   UDS positive for methamphetamine. CMP unremarkable. CBC wnl. HCG negative.     Pt medically stable, no acute medical concerns. Chronic medical problems stable. Will sign off. Please consult for any new medical issues or concerns.       Code Status: Full Code.    Lindsay Mcmahon CNP      -Data reviewed today: I reviewed all new labs and imaging results over the last 24 hours.     Physical Exam   Temp: 97.9  F (36.6  C) Temp src: Tympanic BP: 111/67 Pulse: 81   Resp: 14 SpO2: 98 % O2 Device: None (Room air)    Vitals:    08/16/22 1317   Weight: 89.2 kg (196 lb 9.6 oz)     Vital Signs with Ranges  Temp:  [97.9  F (36.6  C)-98.6  F (37  C)] 97.9  F (36.6  C)  Pulse:  [81-88]  81  Resp:  [14-16] 14  BP: (111-117)/(67-69) 111/67  SpO2:  [97 %-98 %] 98 %  No intake/output data recorded.    Constitutional: awake, alert, cooperative, no apparent distress, and appears stated age, vitals stable   Eyes: Lids and lashes normal, pupils equal, round and reactive to light, extra ocular muscles intact, sclera clear, conjunctiva normal  ENT: back of throat slightly red, otherwise Normocephalic, without obvious abnormality, atraumatic, sinuses nontender on palpation, external ears without lesions, oral pharynx with moist mucous membranes, no erythema or exudates  Hematologic / Lymphatic: no cervical lymphadenopathy  Respiratory: No increased work of breathing, good air exchange, clear to auscultation bilaterally, no crackles or wheezing  Cardiovascular: Normal apical impulse, regular rate and rhythm, normal S1 and S2, no S3 or S4, and no murmur noted  Neuropsychiatric: General: blunted, calm and normal eye contact    Medications     busPIRone  10 mg Oral TID     escitalopram  20 mg Oral Daily     fluticasone  2 spray Both Nostrils Daily     loratadine  10 mg Oral Daily       Data   Recent Labs   Lab 08/19/22  0953 08/15/22  2117   WBC 8.3 7.6   HGB 13.4 13.5   MCV 88 87    357   NA  --  138   POTASSIUM  --  3.5   CHLORIDE  --  103   CO2  --  26   BUN  --  8   CR  --  0.94   ANIONGAP  --  9   COOPER  --  8.8   GLC  --  114*   ALBUMIN  --  3.9   PROTTOTAL  --  6.7   BILITOTAL  --  0.4   ALKPHOS  --  47   ALT  --  15   AST  --  15       No results found for this or any previous visit (from the past 24 hour(s)).

## 2022-08-19 NOTE — PLAN OF CARE
"Face to face end of shift report received from Shilpa TRUJILLO RN. Rounding completed. Patient observed in bed, awake.     Pt has been calm, cooperative this shift. She is withdrawn, isolative to her room. She denied any SI/HI and AH/VH. She stated \"I don't really know what I feel today. I just feel numb\". Pt reports generalized pain due to not feeling well- declined Tylenol. Pt has taken all medications as prescribed. She reported nightmares last night and questions if it is due to starting new medication. Information passed along to provider. Pt reports sore throat, congestion, and overall tiredness. Influenza A/B, COVID, and strep swab ordered. Swabs collected- all results were negative. Pt reported off to the provider that she is uncomfortable coming out to the open unit due to not having a bra. Pt was provided a sports bra, but she still remained in her room/bed. Encouraged to attend groups, but she declined stating she was just too tired. Pt able to make needs known. Frequent rounding.     .     Problem: Behavioral Health Plan of Care  Goal: Patient-Specific Goal (Individualization)  Description: Patient will report at least 6-8 hours of sleep each night.   Patient will participate in at least 50% of group programming while on the unit.   Patient will complete all ADL's independently while on the unit.   Patient will be compliant with treatment team recommendations.   Outcome: Ongoing, Progressing     Problem: Suicidal Behavior  Goal: Suicidal Behavior is Absent or Managed  Description: Patient will contract for safety if having suicidal ideations while on the unit.   Patient will report absence of suicidal ideations prior to discharge.   Outcome: Ongoing, Progressing     Problem: Thought Process Alteration  Goal: Optimal Thought Clarity  Outcome: Ongoing, Progressing     Problem: Suicide Risk  Goal: Absence of Self-Harm  Outcome: Ongoing, Progressing       Gale Chaidez RN  8/19/2022  9:49 AM    "

## 2022-08-19 NOTE — PLAN OF CARE
Problem: Behavioral Health Plan of Care  Goal: Patient-Specific Goal (Individualization)  Description: Patient will report at least 6-8 hours of sleep each night.   Patient will participate in at least 50% of group programming while on the unit.   Patient will complete all ADL's independently while on the unit.   Patient will be compliant with treatment team recommendations.   Outcome: Ongoing, Progressing  Note: Report received from Cynthia. Rounding complete. Pt observed sleeping in right side lying position with regular and unlabored respirations.    Pt has been in bed with eyes closed and regular respirations. 15 minute and PRN checks all night. No complaints offered. Will continue to monitor.    Pt slept approx  8  hours this NOC shift.    Face to face end of shift report communicated to oncoming RN.    Shilpa HALL RN  August 19, 2022  4:07 AM          Problem: Suicidal Behavior  Goal: Suicidal Behavior is Absent or Managed  Description: Patient will contract for safety if having suicidal ideations while on the unit.   Patient will report absence of suicidal ideations prior to discharge.   Outcome: Ongoing, Progressing  Note: Unable to assess due to pt sleeping. Pt has remained free of self-harm.       Problem: Thought Process Alteration  Goal: Optimal Thought Clarity  Outcome: Ongoing, Progressing  Note: Unable to assess due to pt sleeping. No issues or concerns noted at this time.       Problem: Suicide Risk  Goal: Absence of Self-Harm  Outcome: Ongoing, Progressing   Goal Outcome Evaluation:

## 2022-08-19 NOTE — PROGRESS NOTES
"[facility] PSYCHIATRY  PROGRESS NOTE     SUBJECTIVE     Patient is resting in her room.  She reports mood as \"blah\" and feels anxious about leaving her room.  She started having cold/flu symptoms yesterday, FNP running labs/tests - see Salome note - negative for influenza's and covid.  Patient denies suicidal thoughts, however she presents as sad and depressed.  She is agreeable to increase Buspar to 10mg tid and she reports hydroxyzine is helping her fall asleep.  We discuss discharge plans, patient aware of treatment referrals out and she is agreeable to stay until Monday, if there has not been any acceptance to a facility, she may discharge to her Dad's if no further decline in mental status as she did have substantial intentional overdose leading to admission.         MEDICATIONS   Scheduled Meds:    busPIRone  10 mg Oral TID     escitalopram  20 mg Oral Daily     fluticasone  2 spray Both Nostrils Daily     loratadine  10 mg Oral Daily     PRN Meds:.acetaminophen, alum & mag hydroxide-simethicone, benzocaine-menthol, calcium carbonate, hydrOXYzine, melatonin, OLANZapine **OR** OLANZapine, senna-docusate     ALLERGIES   No Known Allergies     MENTAL STATUS EXAM   Vitals: /67 (BP Location: Right arm)   Pulse 81   Temp 97.9  F (36.6  C) (Tympanic)   Resp 14   Ht 1.626 m (5' 4\")   Wt 89.2 kg (196 lb 9.6 oz)   SpO2 98%   BMI 33.75 kg/m      Appearance:  dressed in hospital scrubs  Attitude:  cooperative  Eye Contact:  fair  Mood:  anxious, sad  and depressed  Affect:  flat, depressed  Speech:  clear, coherent  Psychomotor Behavior:  no evidence of tardive dyskinesia, dystonia, or tics  Thought Process:  logical  Associations:  no loose associations  Thought Content:  denies suicidal ideation today and no evidence of hallucinations  Insight:  fair  Judgment:  limited  Oriented to:  time, person, and place  Attention Span and Concentration:  limited  Recent and Remote Memory:  fair  Language: Able to " name objects, Able to repeat phrases and Able to read and write  Fund of Knowledge: appropriate  Muscle Strength and Tone: normal  Gait and Station: Normal     LABS   Recent Results (from the past 24 hour(s))   CBC with platelets and differential    Collection Time: 08/19/22  9:53 AM   Result Value Ref Range    WBC Count 8.3 4.0 - 11.0 10e3/uL    RBC Count 4.56 3.80 - 5.20 10e6/uL    Hemoglobin 13.4 11.7 - 15.7 g/dL    Hematocrit 40.3 35.0 - 47.0 %    MCV 88 78 - 100 fL    MCH 29.4 26.5 - 33.0 pg    MCHC 33.3 31.5 - 36.5 g/dL    RDW 13.1 10.0 - 15.0 %    Platelet Count 298 150 - 450 10e3/uL    % Neutrophils 70 %    % Lymphocytes 20 %    % Monocytes 7 %    % Eosinophils 1 %    % Basophils 1 %    % Immature Granulocytes 1 %    NRBCs per 100 WBC 0 <1 /100    Absolute Neutrophils 6.0 1.6 - 8.3 10e3/uL    Absolute Lymphocytes 1.6 0.8 - 5.3 10e3/uL    Absolute Monocytes 0.6 0.0 - 1.3 10e3/uL    Absolute Eosinophils 0.1 0.0 - 0.7 10e3/uL    Absolute Basophils 0.0 0.0 - 0.2 10e3/uL    Absolute Immature Granulocytes 0.0 <=0.4 10e3/uL    Absolute NRBCs 0.0 10e3/uL   Extra Red Top Tube    Collection Time: 08/19/22  9:53 AM   Result Value Ref Range    Hold Specimen JIC    Extra Green Top (Lithium Heparin) Tube    Collection Time: 08/19/22  9:53 AM   Result Value Ref Range    Hold Specimen JIC    Symptomatic; Yes; 8/18/2022 Influenza A/B & SARS-CoV2 (COVID-19) Virus PCR Multiplex Nose    Collection Time: 08/19/22 10:59 AM    Specimen: Nose; Swab   Result Value Ref Range    Influenza A PCR Negative Negative    Influenza B PCR Negative Negative    RSV PCR Negative Negative    SARS CoV2 PCR Negative Negative   Group A Streptococcus PCR Throat Swab    Collection Time: 08/19/22 11:15 AM    Specimen: Throat; Swab   Result Value Ref Range    Group A strep by PCR Not Detected Not Detected         IMPRESSION     Patient presents with worsening depression and suicidal thoughts over the last 6 months which led to a suicide attempt  yesterday when she overdosed on Ambien. Admits to a multitude of stressors including living with her father, child custody issues and recent divorce, but it sounds like losing custody of her children and feeling teamed up on by her family is what ultimately precipitated her suicide attempt. Symptoms further exacerbated by methamphetamine and alcohol abuse. Continues to endorse SI stating she wishes she weren't here. Agreeable to restarting PTA Lexapro and is requesting help for chemical dependency       DIAGNOSES     1. Unspecified depressive disorder  2. Rule out major depressive disorder  3. Rule out substance induced mood disorder  4. Methamphetamine use disorder  5. Alcohol use disorder  5. Generalized anxiety disorder and panic disorder by history        PLAN     Location: Unit 5  Legal Status: Orders Placed This Encounter      Legal status 72 Hour Hold    Safety Assessment:    Behavioral Orders   Procedures     Code 1 - Restrict to Unit     Routine Programming     As clinically indicated     Status 15     Every 15 minutes.      PTA medications continued/changed:     -Lexapro 20 mg daily continued    New medications tried and stopped:     -None    New medications initiated:     --Standard unit PRNs for safety and comfort     Today's Changes:    - Increase Buspar to 10 mg TID    Programming: Patient will be treated in a therapeutic milieu with appropriate individual and group therapies. Education will be provided on diagnoses, medications, and treatments.     Medical diagnoses:  Per medicine     Consult/Referrals: Referral for CD assessment  Tests: None     Anticipated LOS: 3-5 days  Disposition: Back home with father vs. CD treatment -Rule 25 completed with [facility] - waiting on recommendations and referral status     ATTESTATION      Mable Paris NP

## 2022-08-20 LAB
BACTERIA BLD CULT: NO GROWTH
BACTERIA BLD CULT: NO GROWTH

## 2022-08-20 PROCEDURE — 250N000013 HC RX MED GY IP 250 OP 250 PS 637: Performed by: NURSE PRACTITIONER

## 2022-08-20 PROCEDURE — 99232 SBSQ HOSP IP/OBS MODERATE 35: CPT | Performed by: NURSE PRACTITIONER

## 2022-08-20 PROCEDURE — 124N000001 HC R&B MH

## 2022-08-20 RX ADMIN — LORATADINE 10 MG: 10 TABLET ORAL at 08:09

## 2022-08-20 RX ADMIN — BUSPIRONE HYDROCHLORIDE 10 MG: 10 TABLET ORAL at 08:09

## 2022-08-20 RX ADMIN — ESCITALOPRAM OXALATE 20 MG: 10 TABLET ORAL at 08:09

## 2022-08-20 RX ADMIN — BUSPIRONE HYDROCHLORIDE 10 MG: 10 TABLET ORAL at 13:46

## 2022-08-20 RX ADMIN — BUSPIRONE HYDROCHLORIDE 10 MG: 10 TABLET ORAL at 20:32

## 2022-08-20 RX ADMIN — HYDROXYZINE HYDROCHLORIDE 25 MG: 25 TABLET, FILM COATED ORAL at 20:32

## 2022-08-20 RX ADMIN — FLUTICASONE PROPIONATE 2 SPRAY: 50 SPRAY, METERED NASAL at 13:33

## 2022-08-20 ASSESSMENT — ACTIVITIES OF DAILY LIVING (ADL)
ADLS_ACUITY_SCORE: 28
DRESS: INDEPENDENT;SCRUBS (BEHAVIORAL HEALTH)
LAUNDRY: UNABLE TO COMPLETE
DRESS: INDEPENDENT
ADLS_ACUITY_SCORE: 28
HYGIENE/GROOMING: INDEPENDENT
ADLS_ACUITY_SCORE: 28
HYGIENE/GROOMING: INDEPENDENT;SHOWER
ADLS_ACUITY_SCORE: 28
ADLS_ACUITY_SCORE: 28
ORAL_HYGIENE: INDEPENDENT

## 2022-08-20 NOTE — PLAN OF CARE
Problem: Behavioral Health Plan of Care  Goal: Patient-Specific Goal (Individualization)  Description: Patient will report at least 6-8 hours of sleep each night.   Patient will participate in at least 50% of group programming while on the unit.   Patient will complete all ADL's independently while on the unit.   Patient will be compliant with treatment team recommendations.   Outcome: Ongoing, Progressing  Note: Report received from Cynthia. Rounding complete. Pt observed sleeping in right side lying position with regular and unlabored respirations.    Pt has been in bed with eyes closed and regular respirations. 15 minute and PRN checks all night. No complaints offered. Will continue to monitor.    Pt slept approx  8  hours this NOC shift.    Face to face end of shift report communicated to oncoming RN.    Shilpa HALL RN  August 20, 2022  1:31 AM          Problem: Suicidal Behavior  Goal: Suicidal Behavior is Absent or Managed  Description: Patient will contract for safety if having suicidal ideations while on the unit.   Patient will report absence of suicidal ideations prior to discharge.   Outcome: Ongoing, Progressing  Note: Unable to assess due to pt sleeping. Pt has remained free of self-harm.       Problem: Thought Process Alteration  Goal: Optimal Thought Clarity  Outcome: Ongoing, Progressing  Note: Unable to assess due to pt sleeping. No issues or concerns noted at this time.       Problem: Suicide Risk  Goal: Absence of Self-Harm  Outcome: Ongoing, Progressing  Note: Unable to assess due to pt sleeping. Pt has remained free of self-harm.     Goal Outcome Evaluation:

## 2022-08-20 NOTE — PROGRESS NOTES
"[facility] PSYCHIATRY  PROGRESS NOTE     SUBJECTIVE     Patient is reading in her room.  She reports feeling \"jame better\" and did attend a couple groups yesterday and has been up and out of room more.  She reports anxiety is a bit better as well, does not feel as panicky now since increase in buspar.  Patient reports having interrupted sleep with vivid, active dreams, denies this were nightmares although she would wake up with anxiety.  She denies suicidal thoughts, displays brighter affect today.  She received recommendation from Rule 25 which is for inpatient CD treatment and she signed several ROIs for referrals.  Patient continues to want to discharge on Monday to her Dad's so she can prepare herself and her kids for her being in treatment.         MEDICATIONS   Scheduled Meds:    busPIRone  10 mg Oral TID     escitalopram  20 mg Oral Daily     fluticasone  2 spray Both Nostrils Daily     loratadine  10 mg Oral Daily     PRN Meds:.acetaminophen, alum & mag hydroxide-simethicone, benzocaine-menthol, calcium carbonate, hydrOXYzine, melatonin, OLANZapine **OR** OLANZapine, senna-docusate     ALLERGIES   No Known Allergies     MENTAL STATUS EXAM   Vitals: /74 (BP Location: Right arm)   Pulse 71   Temp 98  F (36.7  C) (Temporal)   Resp 14   Ht 1.626 m (5' 4\")   Wt 89.2 kg (196 lb 9.6 oz)   SpO2 97%   BMI 33.75 kg/m      Appearance:  dressed in hospital scrubs  Attitude:  cooperative  Eye Contact:  fair  Mood:  anxious, sad  and depressed - some improvement  Affect:  flat, depressed  Speech:  clear, coherent  Psychomotor Behavior:  no evidence of tardive dyskinesia, dystonia, or tics  Thought Process:  logical  Associations:  no loose associations  Thought Content:  denies suicidal ideation today and no evidence of hallucinations  Insight:  fair  Judgment:  limited  Oriented to:  time, person, and place  Attention Span and Concentration:  limited  Recent and Remote Memory:  fair  Language: Able to " name objects, Able to repeat phrases and Able to read and write  Fund of Knowledge: appropriate  Muscle Strength and Tone: normal  Gait and Station: Normal     LABS   Recent Results (from the past 24 hour(s))   CBC with platelets and differential    Collection Time: 08/19/22  9:53 AM   Result Value Ref Range    WBC Count 8.3 4.0 - 11.0 10e3/uL    RBC Count 4.56 3.80 - 5.20 10e6/uL    Hemoglobin 13.4 11.7 - 15.7 g/dL    Hematocrit 40.3 35.0 - 47.0 %    MCV 88 78 - 100 fL    MCH 29.4 26.5 - 33.0 pg    MCHC 33.3 31.5 - 36.5 g/dL    RDW 13.1 10.0 - 15.0 %    Platelet Count 298 150 - 450 10e3/uL    % Neutrophils 70 %    % Lymphocytes 20 %    % Monocytes 7 %    % Eosinophils 1 %    % Basophils 1 %    % Immature Granulocytes 1 %    NRBCs per 100 WBC 0 <1 /100    Absolute Neutrophils 6.0 1.6 - 8.3 10e3/uL    Absolute Lymphocytes 1.6 0.8 - 5.3 10e3/uL    Absolute Monocytes 0.6 0.0 - 1.3 10e3/uL    Absolute Eosinophils 0.1 0.0 - 0.7 10e3/uL    Absolute Basophils 0.0 0.0 - 0.2 10e3/uL    Absolute Immature Granulocytes 0.0 <=0.4 10e3/uL    Absolute NRBCs 0.0 10e3/uL   Extra Red Top Tube    Collection Time: 08/19/22  9:53 AM   Result Value Ref Range    Hold Specimen JIC    Extra Green Top (Lithium Heparin) Tube    Collection Time: 08/19/22  9:53 AM   Result Value Ref Range    Hold Specimen JIC    Symptomatic; Yes; 8/18/2022 Influenza A/B & SARS-CoV2 (COVID-19) Virus PCR Multiplex Nose    Collection Time: 08/19/22 10:59 AM    Specimen: Nose; Swab   Result Value Ref Range    Influenza A PCR Negative Negative    Influenza B PCR Negative Negative    RSV PCR Negative Negative    SARS CoV2 PCR Negative Negative   Group A Streptococcus PCR Throat Swab    Collection Time: 08/19/22 11:15 AM    Specimen: Throat; Swab   Result Value Ref Range    Group A strep by PCR Not Detected Not Detected         IMPRESSION     Patient presents with worsening depression and suicidal thoughts over the last 6 months which led to a suicide attempt  yesterday when she overdosed on Ambien. Admits to a multitude of stressors including living with her father, child custody issues and recent divorce, but it sounds like losing custody of her children and feeling teamed up on by her family is what ultimately precipitated her suicide attempt. Symptoms further exacerbated by methamphetamine and alcohol abuse. Continues to endorse SI stating she wishes she weren't here. Agreeable to restarting PTA Lexapro and is requesting help for chemical dependency       DIAGNOSES     1. Unspecified depressive disorder  2. Rule out major depressive disorder  3. Rule out substance induced mood disorder  4. Methamphetamine use disorder  5. Alcohol use disorder  5. Generalized anxiety disorder and panic disorder by history        PLAN     Location: Unit 5  Legal Status: Orders Placed This Encounter      Legal status 72 Hour Hold    Safety Assessment:    Behavioral Orders   Procedures     Code 1 - Restrict to Unit     Routine Programming     As clinically indicated     Status 15     Every 15 minutes.      PTA medications continued/changed:     -Lexapro 20 mg daily continued    New medications tried and stopped:     -None    New medications initiated:     --Standard unit PRNs for safety and comfort     Today's Changes: none     - Continue Buspar to 10 mg TID    Programming: Patient will be treated in a therapeutic milieu with appropriate individual and group therapies. Education will be provided on diagnoses, medications, and treatments.     Medical diagnoses:  Per medicine     Consult/Referrals: Referral for CD assessment  Tests: None     Anticipated LOS: 3-5 days  Disposition: Back home with father vs. CD treatment -Rule 25 completed with [facility] - waiting on recommendations and referral status     ATTESTATION      Mable Paris NP

## 2022-08-20 NOTE — PLAN OF CARE
Face to face end of shift report received from Shilpa TRUJILLO Rn. Rounding completed. Patient observed in [facility].     Pt has been calm, cooperative this shift. She is withdrawn, isolative to her room. She does come to the lounge to eat breakfast, but returns to her room shortly afterwards. She appears guarded, engages minimally with this writer during assessment. She seems more irritable today than yesterday. She states she is feeling better. Denies SI/HI and AH/VH. Denied pain. Pt showered this shift. She has not gone to groups today. She is able to make needs known. Frequent rounding.       Problem: Behavioral Health Plan of Care  Goal: Patient-Specific Goal (Individualization)  Description: Patient will report at least 6-8 hours of sleep each night.   Patient will participate in at least 50% of group programming while on the unit.   Patient will complete all ADL's independently while on the unit.   Patient will be compliant with treatment team recommendations.   Outcome: Ongoing, Progressing     Problem: Suicidal Behavior  Goal: Suicidal Behavior is Absent or Managed  Description: Patient will contract for safety if having suicidal ideations while on the unit.   Patient will report absence of suicidal ideations prior to discharge.   Outcome: Ongoing, Progressing     Problem: Thought Process Alteration  Goal: Optimal Thought Clarity  Outcome: Ongoing, Progressing     Problem: Suicide Risk  Goal: Absence of Self-Harm  Outcome: Ongoing, Progressing       Gale Chaidez RN  8/20/2022  10:25 AM

## 2022-08-21 LAB — BACTERIA CSF CULT: NO GROWTH

## 2022-08-21 PROCEDURE — 250N000013 HC RX MED GY IP 250 OP 250 PS 637: Performed by: NURSE PRACTITIONER

## 2022-08-21 PROCEDURE — 124N000001 HC R&B MH

## 2022-08-21 RX ADMIN — BUSPIRONE HYDROCHLORIDE 10 MG: 10 TABLET ORAL at 20:55

## 2022-08-21 RX ADMIN — FLUTICASONE PROPIONATE 2 SPRAY: 50 SPRAY, METERED NASAL at 14:14

## 2022-08-21 RX ADMIN — BUSPIRONE HYDROCHLORIDE 10 MG: 10 TABLET ORAL at 14:14

## 2022-08-21 RX ADMIN — HYDROXYZINE HYDROCHLORIDE 25 MG: 25 TABLET, FILM COATED ORAL at 20:55

## 2022-08-21 RX ADMIN — LORATADINE 10 MG: 10 TABLET ORAL at 08:58

## 2022-08-21 RX ADMIN — ESCITALOPRAM OXALATE 20 MG: 10 TABLET ORAL at 08:58

## 2022-08-21 RX ADMIN — BUSPIRONE HYDROCHLORIDE 10 MG: 10 TABLET ORAL at 08:58

## 2022-08-21 RX ADMIN — HYDROXYZINE HYDROCHLORIDE 25 MG: 25 TABLET, FILM COATED ORAL at 12:26

## 2022-08-21 ASSESSMENT — ACTIVITIES OF DAILY LIVING (ADL)
ADLS_ACUITY_SCORE: 28
DRESS: INDEPENDENT;SCRUBS (BEHAVIORAL HEALTH)
HYGIENE/GROOMING: INDEPENDENT;SHOWER
ADLS_ACUITY_SCORE: 28

## 2022-08-21 NOTE — PLAN OF CARE
"Problem: Behavioral Health Plan of Care  Goal: Patient-Specific Goal (Individualization)  Description: Patient will report at least 6-8 hours of sleep each night.   Patient will participate in at least 50% of group programming while on the unit.   Patient will complete all ADL's independently while on the unit.   Patient will be compliant with treatment team recommendations.   Outcome: Ongoing, Progressing     Goal Outcome Evaluation:  Pt out of room more this evening, visited and watched TV and football with peers. Pt stated \"I had a really good day,\" and noted that she brushed her teeth and showered today. Feels more rested, anxiety and depression low and tolerable. Pt does share remorse and shame for her suicide attempt, became tearful while clearly stating she no longer has suicidal thoughts. Denied physical complaint this evening and took HS medication as ordered. Requested and was given PRN Hydroxyzine with scheduled Buspar. Did not attend group, encouraged to participate tomorrow.     Problem: Suicidal Behavior  Goal: Suicidal Behavior is Absent or Managed  Description: Patient will contract for safety if having suicidal ideations while on the unit.   Patient will report absence of suicidal ideations prior to discharge.   Outcome: Ongoing, Progressing    Problem: Thought Process Alteration  Goal: Optimal Thought Clarity  Outcome: Ongoing, Progressing    2330 - Face to face end of shift report to be communicated to oncoming shift RN.     Susi Caraballo RN  8/20/2022  10:54 PM        "

## 2022-08-21 NOTE — PLAN OF CARE
Problem: Behavioral Health Plan of Care  Goal: Patient-Specific Goal (Individualization)  Description: Patient will report at least 6-8 hours of sleep each night.   Patient will participate in at least 50% of group programming while on the unit.   Patient will complete all ADL's independently while on the unit.   Patient will be compliant with treatment team recommendations.   Outcome: Ongoing, Progressing     Goal Outcome Evaluation:  Pt denied SI, HI and hallucinations, depression and anxiety tolerable. Pt does tell staff when she is feeling increased anxiety t/o the day and was given PRN Hydroxyzine at 1226 for 6/10 anxiety; pt noted this was quite helpful. Out of room more today and attending groups, shared that some of groups were difficult due to the number of male peers on unit and them overtaking discussions. Staff alerted to be watchful of peer conversations to assure they remain appropriate. Pt denied any physical complaints today, is eating 100% of meals. Reported having more dreams than usual last night, described as realistic and caused her anxiety. Pt is hopeful to discharge in coming days.   2230 - No change in condition t/o the evening, pt attended all groups. Denied SI and hallucinations, mood is stable and pt is hopeful to discharge tomorrow. No tearful episodes, made phone calls and affect is bright. PRN Hydroxyzine requested and rcv'd at 2055 with scheduled Buspar.      Problem: Suicidal Behavior  Goal: Suicidal Behavior is Absent or Managed  Description: Patient will contract for safety if having suicidal ideations while on the unit.   Patient will report absence of suicidal ideations prior to discharge.   Outcome: Ongoing, Progressing    Plan of Care Reviewed With: patient    2330 - Face to face end of shift report to be communicated to oncoming shift RN.     Susi Caraballo RN  8/21/2022  5:54 PM

## 2022-08-21 NOTE — PLAN OF CARE
Problem: Behavioral Health Plan of Care  Goal: Patient-Specific Goal (Individualization)  Description: Patient will report at least 6-8 hours of sleep each night.   Patient will participate in at least 50% of group programming while on the unit.   Patient will complete all ADL's independently while on the unit.   Patient will be compliant with treatment team recommendations.   Outcome: Ongoing, Progressing  Note: Report received from Ajay turner. Pt observed sleeping in left side lying position with regular and unlabored respirations.    Pt has been in bed with eyes closed and regular respirations. 15 minute and PRN checks all night. No complaints offered. Will continue to monitor.    Pt slept approx  8  hours this NOC shift.    Face to face end of shift report communicated to oncoming RN.    Shilpa HALL RN  August 21, 2022  5:16 AM          Problem: Suicidal Behavior  Goal: Suicidal Behavior is Absent or Managed  Description: Patient will contract for safety if having suicidal ideations while on the unit.   Patient will report absence of suicidal ideations prior to discharge.   Outcome: Ongoing, Progressing  Note: Unable to assess due to pt sleeping. Pt has remained free of self-harm.       Problem: Thought Process Alteration  Goal: Optimal Thought Clarity  Outcome: Ongoing, Progressing  Note: Unable to assess due to pt sleeping. No issues or concerns noted at this time.       Problem: Suicide Risk  Goal: Absence of Self-Harm  Outcome: Ongoing, Progressing  Note: Unable to assess due to pt sleeping. Pt has remained free of self-harm.     Goal Outcome Evaluation:

## 2022-08-22 VITALS
BODY MASS INDEX: 34.25 KG/M2 | HEIGHT: 64 IN | HEART RATE: 65 BPM | WEIGHT: 200.6 LBS | RESPIRATION RATE: 16 BRPM | OXYGEN SATURATION: 95 % | TEMPERATURE: 97.4 F | DIASTOLIC BLOOD PRESSURE: 61 MMHG | SYSTOLIC BLOOD PRESSURE: 119 MMHG

## 2022-08-22 PROCEDURE — 250N000013 HC RX MED GY IP 250 OP 250 PS 637: Performed by: NURSE PRACTITIONER

## 2022-08-22 PROCEDURE — 99239 HOSP IP/OBS DSCHRG MGMT >30: CPT | Performed by: NURSE PRACTITIONER

## 2022-08-22 RX ORDER — ESCITALOPRAM OXALATE 20 MG/1
20 TABLET ORAL DAILY
Qty: 30 TABLET | Refills: 0 | Status: SHIPPED | OUTPATIENT
Start: 2022-08-22 | End: 2022-09-12

## 2022-08-22 RX ORDER — BUSPIRONE HYDROCHLORIDE 10 MG/1
10 TABLET ORAL 3 TIMES DAILY
Qty: 90 TABLET | Refills: 0 | Status: SHIPPED | OUTPATIENT
Start: 2022-08-22 | End: 2022-09-12

## 2022-08-22 RX ORDER — HYDROXYZINE HYDROCHLORIDE 25 MG/1
25 TABLET, FILM COATED ORAL 2 TIMES DAILY PRN
Qty: 60 TABLET | Refills: 0 | Status: SHIPPED | OUTPATIENT
Start: 2022-08-22 | End: 2022-09-12

## 2022-08-22 RX ADMIN — ESCITALOPRAM OXALATE 20 MG: 10 TABLET ORAL at 08:04

## 2022-08-22 RX ADMIN — LORATADINE 10 MG: 10 TABLET ORAL at 08:04

## 2022-08-22 RX ADMIN — BUSPIRONE HYDROCHLORIDE 10 MG: 10 TABLET ORAL at 08:04

## 2022-08-22 ASSESSMENT — ACTIVITIES OF DAILY LIVING (ADL)
DRESS: SCRUBS (BEHAVIORAL HEALTH)
ADLS_ACUITY_SCORE: 28
HYGIENE/GROOMING: INDEPENDENT
LAUNDRY: UNABLE TO COMPLETE
ORAL_HYGIENE: INDEPENDENT
ADLS_ACUITY_SCORE: 28

## 2022-08-22 NOTE — PLAN OF CARE
Problem: Behavioral Health Plan of Care  Goal: Patient-Specific Goal (Individualization)  Description: Patient will report at least 6-8 hours of sleep each night.   Patient will participate in at least 50% of group programming while on the unit.   Patient will complete all ADL's independently while on the unit.   Patient will be compliant with treatment team recommendations.   Outcome: Ongoing, Progressing  Note: Report received from Ajay turner. Pt observed sleeping in right side lying position with regular and unlabored respirations.    Pt has been in bed with eyes closed and regular respirations. 15 minute and PRN checks all night. No complaints offered. Will continue to monitor.    Pt slept approx  8  hours this NOC shift.    Face to face end of shift report communicated to oncoming RN.    Shilpa HALL RN  August 22, 2022  5:01 AM          Problem: Suicidal Behavior  Goal: Suicidal Behavior is Absent or Managed  Description: Patient will contract for safety if having suicidal ideations while on the unit.   Patient will report absence of suicidal ideations prior to discharge.   Outcome: Ongoing, Progressing  Note: Unable to assess due to pt sleeping. Pt has remained free of self-harm.       Problem: Thought Process Alteration  Goal: Optimal Thought Clarity  Outcome: Ongoing, Progressing  Note: Unable to assess due to pt sleeping. No issues or concerns noted at this time.       Problem: Suicide Risk  Goal: Absence of Self-Harm  Outcome: Ongoing, Progressing   Goal Outcome Evaluation:

## 2022-08-22 NOTE — PLAN OF CARE
Discharge Note    Patient Discharged to home on 8/22/2022 11:56 AM via Private Car accompanied by Staff.     Patient informed of discharge instructions in AVS. patient verbalizes understanding and denies having any questions pertaining to AVS. Patient stable at time of discharge. Patient denies SI, HI, and thoughts of self harm at time of discharge. All personal belongings returned to patient. Discharge prescriptions sent to [facility] in [place] via electronic communication. Psych evaluation, history and physical, AVS, and discharge summary faxed to next level of care.     Marguerite Adams RN  8/22/2022  11:56 AM    Problem: Behavioral Health Plan of Care  Goal: Patient-Specific Goal (Individualization)  Description: Patient will report at least 6-8 hours of sleep each night.   Patient will participate in at least 50% of group programming while on the unit.   Patient will complete all ADL's independently while on the unit.   Patient will be compliant with treatment team recommendations.   Outcome: Ongoing, Progressing    Face to face end of shift report received from night shift RN. Rounding completed. Pt observed conversing with a peer while sitting in the lobby. Pt denies pain, further needs, and other discomforts. Pt reports wanting to discharge today. Pt accepts and receives this shift's scheduled medications. Pt denies SI. Pt pleasant, calm, and polite throughout interactions. Order for discharge acknowledged. AVS discussed with pt; Questions answered; No further questions presented. Will continue to support the needs of the patient. Face to face end of shift report to be given to evening shift RN.       Problem: Suicidal Behavior  Goal: Suicidal Behavior is Absent or Managed  Description: Patient will contract for safety if having suicidal ideations while on the unit.   Patient will report absence of suicidal ideations prior to discharge.   Outcome: Ongoing, Progressing -- Pt remained free of self injury  and harm throughout the duration of this shift.       Problem: Thought Process Alteration  Goal: Optimal Thought Clarity  Outcome: Ongoing, Progressing     Problem: Suicide Risk  Goal: Absence of Self-Harm -- Pt remained free of self injury and harm throughout the duration of this shift.

## 2022-08-22 NOTE — PROGRESS NOTES
Pt is discharging at the recommendation of the treatment team. Pt is discharging to her dad's house transported by family. Pt denies having any thoughts of hurting themself or anyone else. Pt denies anxiety or depression. Pt has follow up with [facility] for primary care for after hospital follow up, and [facility] for Individual Therapy and Medication Management. Discharge instructions, including; demographic sheet, psychiatric evaluation, discharge summary, and AVS were faxed to these next level of care providers.

## 2022-08-22 NOTE — DISCHARGE SUMMARY
[facility] PSYCHIATRY  DISCHARGE SUMMARY     DISCHARGE DATA     Ilana Aguilar MRN# [id number]   Age: 36 year old YOB: 1985     Date of Admission: 8/16/2022  Date of Discharge: August 22, 2022  Discharge Provider: SUNIL Alvarado CNP       REASON FOR ADMISSION     Patient presents with worsening depression and suicidal thoughts over the last 6 months which led to a suicide attempt yesterday when she overdosed on Ambien. Admits to a multitude of stressors including living with her father, child custody issues and recent divorce, but it sounds like losing custody of her children and feeling teamed up on by her family is what ultimately precipitated her suicide attempt. Symptoms further exacerbated by methamphetamine and alcohol abuse. Continues to endorse SI stating she wishes she weren't here. Agreeable to restarting PTA Lexapro and is requesting help for chemical dependency       DISCHARGE DIAGNOSES     1. Major Depressive Disorder, recurrent, moderate  2. Generalized Anxiety Disorder  3. Methamphetamine Use Disorder  4. Alcohol Use Disorder       CONSULTS     Rule 25       HOSPITAL COURSE     Legal status: Orders Placed This Encounter      Legal status 72 Hour Hold    Patient was admitted to unit 5 due to the aforementioned presentation. The patient was placed under 15 minute checks to ensure patient safety. The patient participated in unit programming and groups as able.  She reports feeling much better, denies cravings and feels ready to go to treatment.  She does have anxiety about this as she has never been to CD treatment, although is displaying motivation to stay sober.  Patient denies suicidal or homicidal thoughts, denies auditory hallucinations and appears ready for discharge.    The following changes to the patient's psychiatric medications were made:    PTA medications continued/changed:     - Discontinued Xanax, Adderall, and Zolpidem  - Continue Lexapro 20mg    New  medications tried and stopped:     - None    New medications initiated:     - Buspar titrated to 10mg tid  - Hydroxyzine 25mg prn    With these changes and supports the patient noticed improvement in their symptoms and felt sufficiently ready for discharge. As a result, Ilana Aguilar was discharged. At the time of discharge, Ilana Aguilar was determined to not be a danger to self or others. The patient was also medically stable for discharge. At the current time of discharge, the patient does not meet criteria for involuntary hospitalization. On the day of discharge, the patient reports that they do not have suicidal or homicidal ideation. Steps taken to minimize risk include: assessing patient s behavior and thought process daily during hospital stay, discharging patient with adequate plan for follow up for mental and physical health and discussing safety plan of returning to the hospital should the patient ever have thoughts of harming themselves or others. Therefore, based on all available evidence including the factors cited above, the patient does not appear to be at imminent risk for self-harm, and is appropriate for outpatient level of care. However, if patient uses substances or is medication non-adherent, their risk of decompensation and SI will be elevated. This was discussed with the patient.       DISCHARGE MEDICATIONS     Current Discharge Medication List      START taking these medications    Details   busPIRone (BUSPAR) 10 MG tablet Take 1 tablet (10 mg) by mouth 3 times daily  Qty: 90 tablet, Refills: 0    Associated Diagnoses: Generalized anxiety disorder      hydrOXYzine (ATARAX) 25 MG tablet Take 1 tablet (25 mg) by mouth 2 times daily as needed for anxiety  Qty: 60 tablet, Refills: 0    Associated Diagnoses: Generalized anxiety disorder         CONTINUE these medications which have CHANGED    Details   escitalopram (LEXAPRO) 20 MG tablet Take 1 tablet (20 mg) by mouth daily  Qty: 30 tablet,  "Refills: 0    Associated Diagnoses: Moderate episode of recurrent major depressive disorder (H)         CONTINUE these medications which have NOT CHANGED    Details   acetaminophen (TYLENOL) 500 MG tablet Take 1,000 mg by mouth daily as needed (headache)      calcium carbonate (TUMS) 500 MG chewable tablet Take 4 chew tab by mouth 4 times daily as needed for heartburn      fluticasone (FLONASE) 50 MCG/ACT nasal spray Spray 2 sprays into both nostrils daily as needed for allergies      loratadine (CLARITIN) 10 MG tablet Take 10 mg by mouth daily as needed         STOP taking these medications       ALPRAZolam (XANAX) 0.5 MG tablet Comments:   Reason for Stopping:         amphetamine-dextroamphetamine (ADDERALL) 20 MG tablet Comments:   Reason for Stopping:         zolpidem (AMBIEN) 5 MG tablet Comments:   Reason for Stopping:                  MENTAL STATUS EXAM   Vitals: /61 (BP Location: Left arm)   Pulse 65   Temp 97.4  F (36.3  C) (Temporal)   Resp 16   Ht 1.626 m (5' 4\")   Wt 91 kg (200 lb 9.6 oz)   SpO2 95%   BMI 34.43 kg/m      Appearance: Alert, oriented, dressed in hospital scrubs, appears stated age   Attitude: Cooperative   Eye Contact: Good  Mood: \"Better\"  Affect: Full range of affect  Speech: Normal rate and rhythm   Psychomotor Behavior: No tremor, rigidity, or psychomotor abnormality   Thought Process: Logical, goal directed   Associations: No loose associations   Thought Content: Denies SI or plan. No SIB. Denies A/V hallucinations. No evidence of delusional thought.  Insight: Good  Judgment: Good  Oriented to: Person, place, and time  Attention Span and Concentration: Intact  Recent and Remote Memory: Intact  Language: English with appropriate syntax and vocabulary  Fund of Knowledge: Average  Muscle Strength and Tone: Grossly normal  Gait and Station: Grossly normal       DISCHARGE PLAN     1.  Education given regarding diagnostic and treatment options with risks, benefits and " alternatives with adequate verbalization of understanding.  2.  Discharge to Home to Dad's. Upon detailed review of risk factors, patient amenable for release.   3.  Continue aforementioned medications and associated medication changes with follow-up by outpatient provider.  4.  Crisis management planning in place.    5.  Nursing and  to review further discharge recommendations.   6.  Patient is being discharged with the following appointments as detailed below.    Health Care Follow-up:      [facility]   Appointment: 8/17/2022 @ 2:00 pm Rule 25  [address]  [address]   [place] [address]   Phone: [phone]  Fax: [phone]      [facility] - [facility]annan Poe -   [address] [place] [address]  Phone: [phone]  Fax: [phone]      [facility]   [address] [place] [address]  Make an Appointment:  [phone]          DISCHARGE SERVICES PROVIDED     40 minutes spent on discharge services, including:  Final examination of patient.  Review and discussion of hospital stay.  Instructions for continued outpatient care/goals.  Preparation of discharge records.  Preparation of medications refills and new prescriptions.  Preparation of applicable referral forms.        ATTESTATION     SUNIL Alvarado CNP       LABS THIS ADMISSION     Results for orders placed or performed during the hospital encounter of 08/16/22   Symptomatic; Yes; 8/18/2022 Influenza A/B & SARS-CoV2 (COVID-19) Virus PCR Multiplex Nose     Status: Normal    Specimen: Nose; Swab   Result Value Ref Range    Influenza A PCR Negative Negative    Influenza B PCR Negative Negative    RSV PCR Negative Negative    SARS CoV2 PCR Negative Negative    Narrative    Testing was performed using the Xpert Xpress CoV2/Flu/RSV Assay on the Cepheid GeneXpert Instrument. This test should be ordered for the detection of SARS-CoV-2 and influenza viruses in individuals  who meet clinical and/or epidemiological criteria. Test performance is unknown in asymptomatic patients. This test is for in vitro diagnostic use under the FDA EUA for laboratories certified under CLIA to perform high or moderate complexity testing. This test has not been FDA cleared or approved. A negative result does not rule out the presence of PCR inhibitors in the specimen or target RNA in concentration below the limit of detection for the assay. If only one viral target is positive but coinfection with multiple targets is suspected, the sample should be re-tested with another FDA cleared, approved, or authorized test, if coinfection would change clinical management. This test was validated by the [facility]. These laboratories are certified under the Clinical  Laboratory Improvement Amendments of 1988 (CLIA-88) as qualified to perform high complexity laboratory testing.   CBC with platelets and differential     Status: None   Result Value Ref Range    WBC Count 8.3 4.0 - 11.0 10e3/uL    RBC Count 4.56 3.80 - 5.20 10e6/uL    Hemoglobin 13.4 11.7 - 15.7 g/dL    Hematocrit 40.3 35.0 - 47.0 %    MCV 88 78 - 100 fL    MCH 29.4 26.5 - 33.0 pg    MCHC 33.3 31.5 - 36.5 g/dL    RDW 13.1 10.0 - 15.0 %    Platelet Count 298 150 - 450 10e3/uL    % Neutrophils 70 %    % Lymphocytes 20 %    % Monocytes 7 %    % Eosinophils 1 %    % Basophils 1 %    % Immature Granulocytes 1 %    NRBCs per 100 WBC 0 <1 /100    Absolute Neutrophils 6.0 1.6 - 8.3 10e3/uL    Absolute Lymphocytes 1.6 0.8 - 5.3 10e3/uL    Absolute Monocytes 0.6 0.0 - 1.3 10e3/uL    Absolute Eosinophils 0.1 0.0 - 0.7 10e3/uL    Absolute Basophils 0.0 0.0 - 0.2 10e3/uL    Absolute Immature Granulocytes 0.0 <=0.4 10e3/uL    Absolute NRBCs 0.0 10e3/uL   Extra Tube     Status: None    Narrative    The following orders were created for panel order Extra Tube.  Procedure                               Abnormality         Status                      ---------                               -----------         ------                     Extra Red Top Tube[[id number]]                               Final result               Extra Green Top (Lithium...[[id number]]                      Final result                 Please view results for these tests on the individual orders.   Extra Red Top Tube     Status: None   Result Value Ref Range    Hold Specimen JIC    Extra Green Top (Lithium Heparin) Tube     Status: None   Result Value Ref Range    Hold Specimen JIC    Group A Streptococcus PCR Throat Swab     Status: Normal    Specimen: Throat; Swab   Result Value Ref Range    Group A strep by PCR Not Detected Not Detected    Narrative    The Xpert Xpress Strep A test, performed on the [employer], is a rapid, qualitative in vitro diagnostic test for the detection of Streptococcus pyogenes (Group A ß-hemolytic Streptococcus, Strep A) in throat swab specimens from patients with signs and symptoms of pharyngitis. The Xpert Xpress Strep A test can be used as an aid in the diagnosis of Group A Streptococcal pharyngitis. The assay is not intended to monitor treatment for Group A Streptococcus infections. The Xpert Xpress Strep A test utilizes an automated real-time polymerase chain reaction (PCR) to detect Streptococcus pyogenes DNA.   CBC with platelets differential     Status: None    Narrative    The following orders were created for panel order CBC with platelets differential.  Procedure                               Abnormality         Status                     ---------                               -----------         ------                     CBC with platelets and d...[[id number]]                      Final result                 Please view results for these tests on the individual orders.

## 2022-09-06 ENCOUNTER — OFFICE VISIT (OUTPATIENT)
Dept: FAMILY MEDICINE | Facility: OTHER | Age: 37
End: 2022-09-06
Attending: PHYSICIAN ASSISTANT
Payer: COMMERCIAL

## 2022-09-06 VITALS
OXYGEN SATURATION: 98 % | BODY MASS INDEX: 34.33 KG/M2 | WEIGHT: 200 LBS | HEART RATE: 76 BPM | RESPIRATION RATE: 16 BRPM | SYSTOLIC BLOOD PRESSURE: 110 MMHG | TEMPERATURE: 97.1 F | DIASTOLIC BLOOD PRESSURE: 80 MMHG

## 2022-09-06 DIAGNOSIS — R35.0 URINARY FREQUENCY: Primary | ICD-10-CM

## 2022-09-06 DIAGNOSIS — N30.01 ACUTE CYSTITIS WITH HEMATURIA: ICD-10-CM

## 2022-09-06 LAB
ALBUMIN UR-MCNC: 600 MG/DL
APPEARANCE UR: ABNORMAL
BACTERIA #/AREA URNS HPF: ABNORMAL /HPF
BILIRUB UR QL STRIP: NEGATIVE
COLOR UR AUTO: YELLOW
GLUCOSE UR STRIP-MCNC: NEGATIVE MG/DL
HGB UR QL STRIP: ABNORMAL
KETONES UR STRIP-MCNC: NEGATIVE MG/DL
LEUKOCYTE ESTERASE UR QL STRIP: ABNORMAL
MUCOUS THREADS #/AREA URNS LPF: PRESENT /LPF
NITRATE UR QL: NEGATIVE
PH UR STRIP: 8.5 [PH] (ref 5–9)
RBC URINE: 58 /HPF
RENAL TUB EPI: 1 /HPF
SP GR UR STRIP: 1.03 (ref 1–1.03)
SQUAMOUS EPITHELIAL: 2 /HPF
TRANSITIONAL EPI: <1 /HPF
UROBILINOGEN UR STRIP-MCNC: 4 MG/DL
WBC URINE: 115 /HPF

## 2022-09-06 PROCEDURE — 81001 URINALYSIS AUTO W/SCOPE: CPT | Mod: ZL | Performed by: NURSE PRACTITIONER

## 2022-09-06 PROCEDURE — 87088 URINE BACTERIA CULTURE: CPT | Mod: ZL | Performed by: NURSE PRACTITIONER

## 2022-09-06 PROCEDURE — 99213 OFFICE O/P EST LOW 20 MIN: CPT | Performed by: NURSE PRACTITIONER

## 2022-09-06 RX ORDER — DEXTROAMPHETAMINE SACCHARATE, AMPHETAMINE ASPARTATE, DEXTROAMPHETAMINE SULFATE AND AMPHETAMINE SULFATE 5; 5; 5; 5 MG/1; MG/1; MG/1; MG/1
TABLET ORAL
COMMUNITY
Start: 2022-08-22 | End: 2022-09-12

## 2022-09-06 RX ORDER — NITROFURANTOIN 25; 75 MG/1; MG/1
100 CAPSULE ORAL 2 TIMES DAILY
Qty: 10 CAPSULE | Refills: 0 | Status: SHIPPED | OUTPATIENT
Start: 2022-09-06 | End: 2022-09-11

## 2022-09-06 ASSESSMENT — PAIN SCALES - GENERAL: PAINLEVEL: NO PAIN (0)

## 2022-09-06 NOTE — PROGRESS NOTES
ASSESSMENT/PLAN:    I have reviewed the nursing notes.  I have reviewed the findings, diagnosis, plan and need for follow up with the patient.    1. Urinary frequency  - UA with Microscopic reflex to Culture  - Urine Culture  Awaiting urine culture results, will follow and treat accordingly. Today there is hematuria present, pyuria, large leukocytes, few bacteria present.     2. Acute cystitis with hematuria  - nitroFURantoin macrocrystal-monohydrate (MACROBID) 100 MG capsule; Take 1 capsule (100 mg) by mouth 2 times daily for 5 days  Dispense: 10 capsule; Refill: 0    Discussed warning signs/symptoms indicative of need to f/u    Follow up if symptoms persist or worsen or concerns    I explained my diagnostic considerations and recommendations to the patient, who voiced understanding and agreement with the treatment plan. All questions were answered. We discussed potential side effects of any prescribed or recommended therapies, as well as expectations for response to treatments.    January Bryan NP  2022  5:37 PM    HPI:  Ilana Aguilar is a 36 year old female who presents to [facility] today for concerns of urinary urgency and frequency that started yesterday. Slightly nauseous, no vomiting. No fevers. No significant history of UTIs. Bilateral flank discomfort. No gross hematuria. No vaginal discharge. No other symptoms are present.     Past Medical History:   Diagnosis Date     Acute vaginitis     History of bacterial vaginosis     Anogenital (venereal) warts     No Comments Provided     Encounter for insertion of intrauterine contraceptive device     16,Paragard Lot# [id number] Exp 2020     Injury of left ankle     Left ankle - denied per patient     Personal history of other medical treatment (CODE)     ,  ( one AB at age 20)     Personal history of urinary calculi     No Comments Provided     Personal history of urinary infection     No Comments Provided     Scoliosis     No Comments  Provided     Social phobia     No Comments Provided     Past Surgical History:   Procedure Laterality Date     OTHER SURGICAL HISTORY      EHH779,COLPOSCOPY     Social History     Tobacco Use     Smoking status: Never Smoker     Smokeless tobacco: Never Used   Substance Use Topics     Alcohol use: No     Alcohol/week: 0.0 standard drinks     Comment: social     Current Outpatient Medications   Medication Sig Dispense Refill     acetaminophen (TYLENOL) 500 MG tablet Take 1,000 mg by mouth daily as needed (headache)       busPIRone (BUSPAR) 10 MG tablet Take 1 tablet (10 mg) by mouth 3 times daily 90 tablet 0     calcium carbonate (TUMS) 500 MG chewable tablet Take 4 chew tab by mouth 4 times daily as needed for heartburn       escitalopram (LEXAPRO) 20 MG tablet Take 1 tablet (20 mg) by mouth daily 30 tablet 0     fluticasone (FLONASE) 50 MCG/ACT nasal spray Spray 2 sprays into both nostrils daily as needed for allergies       hydrOXYzine (ATARAX) 25 MG tablet Take 1 tablet (25 mg) by mouth 2 times daily as needed for anxiety 60 tablet 0     loratadine (CLARITIN) 10 MG tablet Take 10 mg by mouth daily as needed       nitroFURantoin macrocrystal-monohydrate (MACROBID) 100 MG capsule Take 1 capsule (100 mg) by mouth 2 times daily for 5 days 10 capsule 0     amphetamine-dextroamphetamine (ADDERALL) 20 MG tablet TAKE 1 TABLET BY MOUTH THREE TIMES DAILY (Patient not taking: Reported on 9/6/2022)       No Known Allergies  Past medical history, past surgical history, current medications and allergies reviewed and accurate to the best of my knowledge.      ROS:  Refer to HPI    /80   Pulse 76   Temp 97.1  F (36.2  C) (Temporal)   Resp 16   Wt 90.7 kg (200 lb)   LMP 08/22/2022   SpO2 98%   Breastfeeding No   BMI 34.33 kg/m      EXAM:  General Appearance: Well appearing 36 year old female, appropriate appearance for age. No acute distress   Respiratory: No increased work of breathing.  No cough appreciated.  :   + suprapubic tenderness to palpation.  Absent CVA tenderness to palpation.    Psychological: normal affect, alert, oriented, and pleasant.     Results for orders placed or performed in visit on 09/06/22   UA with Microscopic reflex to Culture     Status: Abnormal    Specimen: Urine, Midstream   Result Value Ref Range    Color Urine Yellow Colorless, Straw, Light Yellow, Yellow    Appearance Urine Slightly Cloudy (A) Clear    Glucose Urine Negative Negative mg/dL    Bilirubin Urine Negative Negative    Ketones Urine Negative Negative mg/dL    Specific Gravity Urine 1.027 1.000 - 1.030    Blood Urine Small (A) Negative    pH Urine 8.5 5.0 - 9.0    Protein Albumin Urine 600  (A) Negative mg/dL    Urobilinogen Urine 4.0 (A) Normal, 2.0 mg/dL    Nitrite Urine Negative Negative    Leukocyte Esterase Urine Large (A) Negative    Bacteria Urine Few (A) None Seen /HPF    Mucus Urine Present (A) None Seen /LPF    RBC Urine 58 (H) <=2 /HPF    WBC Urine 115 (H) <=5 /HPF    Squamous Epithelials Urine 2 (H) <=1 /HPF    Transitional Epithelials Urine <1 <=1 /HPF    Renal Tubular Epithelials Urine 1 (H) None Seen /HPF    Narrative    Urine Culture ordered based on laboratory criteria

## 2022-09-06 NOTE — NURSING NOTE
"Chief Complaint   Patient presents with     Urinary Problem     Urinary frequency and urgency since yesterday     She has had urinary frequency and urgency since yesterday. It hurts after she urinates.  Miranda Gonzalez LPN..................9/6/2022   5:18 PM    Initial /80   Pulse 76   Temp 97.1  F (36.2  C) (Temporal)   Resp 16   Wt 90.7 kg (200 lb)   LMP 08/22/2022   SpO2 98%   Breastfeeding No   BMI 34.33 kg/m   Estimated body mass index is 34.33 kg/m  as calculated from the following:    Height as of 8/16/22: 1.626 m (5' 4\").    Weight as of this encounter: 90.7 kg (200 lb).  Medication Reconciliation: complete    FOOD SECURITY SCREENING QUESTIONS  Hunger Vital Signs:  Within the past 12 months we worried whether our food would run out before we got money to buy more. Never  Within the past 12 months the food we bought just didn't last and we didn't have money to get more. Never        Advance care directive on file? no  Advance care directive provided to patient? declined     Miranda Gonzalez, DAPHNE  "

## 2022-09-09 LAB — BACTERIA UR CULT: ABNORMAL

## 2022-09-12 ENCOUNTER — OFFICE VISIT (OUTPATIENT)
Dept: FAMILY MEDICINE | Facility: OTHER | Age: 37
End: 2022-09-12
Attending: PHYSICIAN ASSISTANT
Payer: COMMERCIAL

## 2022-09-12 VITALS
OXYGEN SATURATION: 97 % | RESPIRATION RATE: 16 BRPM | HEART RATE: 64 BPM | DIASTOLIC BLOOD PRESSURE: 68 MMHG | WEIGHT: 200.6 LBS | SYSTOLIC BLOOD PRESSURE: 112 MMHG | TEMPERATURE: 97.5 F | BODY MASS INDEX: 34.43 KG/M2

## 2022-09-12 DIAGNOSIS — F33.1 MODERATE EPISODE OF RECURRENT MAJOR DEPRESSIVE DISORDER (H): ICD-10-CM

## 2022-09-12 DIAGNOSIS — R30.0 DYSURIA: ICD-10-CM

## 2022-09-12 DIAGNOSIS — F41.1 GENERALIZED ANXIETY DISORDER: Primary | ICD-10-CM

## 2022-09-12 DIAGNOSIS — Z11.3 SCREEN FOR STD (SEXUALLY TRANSMITTED DISEASE): ICD-10-CM

## 2022-09-12 LAB
ALBUMIN UR-MCNC: NEGATIVE MG/DL
APPEARANCE UR: CLEAR
BILIRUB UR QL STRIP: NEGATIVE
C TRACH DNA SPEC QL PROBE+SIG AMP: NEGATIVE
COLOR UR AUTO: YELLOW
GLUCOSE UR STRIP-MCNC: NEGATIVE MG/DL
HGB UR QL STRIP: NEGATIVE
KETONES UR STRIP-MCNC: NEGATIVE MG/DL
LEUKOCYTE ESTERASE UR QL STRIP: NEGATIVE
N GONORRHOEA DNA SPEC QL NAA+PROBE: NEGATIVE
NITRATE UR QL: NEGATIVE
PH UR STRIP: 7 [PH] (ref 5–9)
SP GR UR STRIP: 1.01 (ref 1–1.03)
UROBILINOGEN UR STRIP-MCNC: NORMAL MG/DL

## 2022-09-12 PROCEDURE — 99214 OFFICE O/P EST MOD 30 MIN: CPT | Performed by: PHYSICIAN ASSISTANT

## 2022-09-12 PROCEDURE — 81003 URINALYSIS AUTO W/O SCOPE: CPT | Mod: ZL | Performed by: PHYSICIAN ASSISTANT

## 2022-09-12 PROCEDURE — 87491 CHLMYD TRACH DNA AMP PROBE: CPT | Mod: ZL | Performed by: PHYSICIAN ASSISTANT

## 2022-09-12 PROCEDURE — 87086 URINE CULTURE/COLONY COUNT: CPT | Mod: ZL | Performed by: PHYSICIAN ASSISTANT

## 2022-09-12 RX ORDER — BUSPIRONE HYDROCHLORIDE 10 MG/1
10 TABLET ORAL 3 TIMES DAILY
Qty: 90 TABLET | Refills: 1 | Status: ON HOLD | OUTPATIENT
Start: 2022-09-12 | End: 2022-12-18

## 2022-09-12 RX ORDER — HYDROXYZINE HYDROCHLORIDE 25 MG/1
TABLET, FILM COATED ORAL
Qty: 90 TABLET | Refills: 2 | Status: ON HOLD | OUTPATIENT
Start: 2022-09-12 | End: 2022-11-29

## 2022-09-12 RX ORDER — ESCITALOPRAM OXALATE 20 MG/1
20 TABLET ORAL DAILY
Qty: 30 TABLET | Refills: 1 | Status: SHIPPED | OUTPATIENT
Start: 2022-09-12 | End: 2022-10-31

## 2022-09-12 RX ORDER — SULFAMETHOXAZOLE/TRIMETHOPRIM 800-160 MG
1 TABLET ORAL 2 TIMES DAILY
Qty: 10 TABLET | Refills: 0 | Status: SHIPPED | OUTPATIENT
Start: 2022-09-12 | End: 2022-09-17

## 2022-09-12 ASSESSMENT — ANXIETY QUESTIONNAIRES
7. FEELING AFRAID AS IF SOMETHING AWFUL MIGHT HAPPEN: SEVERAL DAYS
2. NOT BEING ABLE TO STOP OR CONTROL WORRYING: SEVERAL DAYS
8. IF YOU CHECKED OFF ANY PROBLEMS, HOW DIFFICULT HAVE THESE MADE IT FOR YOU TO DO YOUR WORK, TAKE CARE OF THINGS AT HOME, OR GET ALONG WITH OTHER PEOPLE?: SOMEWHAT DIFFICULT
4. TROUBLE RELAXING: MORE THAN HALF THE DAYS
GAD7 TOTAL SCORE: 9
IF YOU CHECKED OFF ANY PROBLEMS ON THIS QUESTIONNAIRE, HOW DIFFICULT HAVE THESE PROBLEMS MADE IT FOR YOU TO DO YOUR WORK, TAKE CARE OF THINGS AT HOME, OR GET ALONG WITH OTHER PEOPLE: SOMEWHAT DIFFICULT
5. BEING SO RESTLESS THAT IT IS HARD TO SIT STILL: SEVERAL DAYS
1. FEELING NERVOUS, ANXIOUS, OR ON EDGE: MORE THAN HALF THE DAYS
6. BECOMING EASILY ANNOYED OR IRRITABLE: SEVERAL DAYS
3. WORRYING TOO MUCH ABOUT DIFFERENT THINGS: SEVERAL DAYS
7. FEELING AFRAID AS IF SOMETHING AWFUL MIGHT HAPPEN: SEVERAL DAYS

## 2022-09-12 ASSESSMENT — PATIENT HEALTH QUESTIONNAIRE - PHQ9
SUM OF ALL RESPONSES TO PHQ QUESTIONS 1-9: 11
SUM OF ALL RESPONSES TO PHQ QUESTIONS 1-9: 11
10. IF YOU CHECKED OFF ANY PROBLEMS, HOW DIFFICULT HAVE THESE PROBLEMS MADE IT FOR YOU TO DO YOUR WORK, TAKE CARE OF THINGS AT HOME, OR GET ALONG WITH OTHER PEOPLE: SOMEWHAT DIFFICULT

## 2022-09-12 ASSESSMENT — PAIN SCALES - GENERAL: PAINLEVEL: NO PAIN (0)

## 2022-09-12 NOTE — PROGRESS NOTES
Assessment & Plan   Problem List Items Addressed This Visit        Behavioral    Generalized anxiety disorder - Primary    Relevant Medications    hydrOXYzine (ATARAX) 25 MG tablet    escitalopram (LEXAPRO) 20 MG tablet    busPIRone (BUSPAR) 10 MG tablet    Other Relevant Orders    DEPRESSION ACTION PLAN (DAP) (Completed)      Other Visit Diagnoses     Moderate episode of recurrent major depressive disorder (H)        Relevant Medications    hydrOXYzine (ATARAX) 25 MG tablet    escitalopram (LEXAPRO) 20 MG tablet    busPIRone (BUSPAR) 10 MG tablet    Other Relevant Orders    DEPRESSION ACTION PLAN (DAP) (Completed)    Dysuria        Relevant Medications    sulfamethoxazole-trimethoprim (BACTRIM DS) 800-160 MG tablet    Other Relevant Orders    UA reflex to Microscopic (Completed)    Urine Culture Aerobic Bacterial    Screen for STD (sexually transmitted disease)        Relevant Orders    GC/Chlamydia by PCR         Completed gonorrhea and Chlamydia STD screen.    Completed urinalysis and urine culture to rule out infection concerns.  Results are pending.  Started on Bactrim antibiotic for an acute UTI.  Antibiotic has been sent to pharmacy. Please take full course of antibiotic even if symptoms have completely resolved. This helps prevent against antibiotic resistance.   We will culture the urine to see what bacteria grows out of the urine.  We will call if a change of antibiotic is necessary per the culture.  Please increase fluids including water and cranberry juice.  Monitor for fevers, chills, back pain.  Return to clinic after antibiotic completion if symptoms are not resolved.  Call clinic if symptoms change/worsen.    Depression and anxiety: Continue seeing psychiatry and her therapist.  Increased her hydroxyzine to 25 mg in the morning and 50 mg at bedtime to help with anxiety and insomnia.  Continue Lexapro and buspirone.  Gave warning signs and symptoms.     BMI:   Estimated body mass index is 34.43  "kg/m  as calculated from the following:    Height as of 8/16/22: 1.626 m (5' 4\").    Weight as of this encounter: 91 kg (200 lb 9.6 oz).       Depression Screening Follow Up    PHQ 9/12/2022   PHQ-9 Total Score 11   Q9: Thoughts of better off dead/self-harm past 2 weeks Several days   F/U: Thoughts of suicide or self-harm No   F/U: Safety concerns No     Last PHQ-9 9/12/2022   1.  Little interest or pleasure in doing things 1   2.  Feeling down, depressed, or hopeless 2   3.  Trouble falling or staying asleep, or sleeping too much 2   4.  Feeling tired or having little energy 1   5.  Poor appetite or overeating 1   6.  Feeling bad about yourself 2   7.  Trouble concentrating 1   8.  Moving slowly or restless 0   Q9: Thoughts of better off dead/self-harm past 2 weeks 1   PHQ-9 Total Score 11   In the past two weeks have you had thoughts of suicide or self harm? No   Do you have concerns about your personal safety or the safety of others? No         Follow Up      Follow Up Actions Taken  Crisis resource information provided in the After Visit Summary    Discussed the following ways the patient can remain in a safe environment:    See Patient Instructions    Return if symptoms worsen or fail to improve.    Savannah Alcaraz PA-C  [facility] is a 36 year old, presenting for the following health issues:  Hospital F/U      History of Present Illness       Mental Health Follow-up:  Patient presents to follow-up on Depression & Anxiety.Patient's depression since last visit has been:  Medium  The patient is not having other symptoms associated with depression.  Patient's anxiety since last visit has been:  Medium  The patient is not having other symptoms associated with anxiety.  Any significant life events: relationship concerns, job concerns, financial concerns and housing concerns  Patient is feeling anxious or having panic attacks.  Patient has no concerns about alcohol or drug " use.    She eats 0-1 servings of fruits and vegetables daily.She consumes 1 sweetened beverage(s) daily.She exercises with enough effort to increase her heart rate 10 to 19 minutes per day.  She exercises with enough effort to increase her heart rate 3 or less days per week. She is missing 1 dose(s) of medications per week.    Today's PHQ-9         PHQ-9 Total Score: 11    PHQ-9 Q9 Thoughts of better off dead/self-harm past 2 weeks :   Several days  Thoughts of suicide or self harm: (P) No  Self-harm Plan:     Self-harm Action:       Safety concerns for self or others: (P) No    How difficult have these problems made it for you to do your work, take care of things at home, or get along with other people: Somewhat difficult  Today's YASMANI-7 Score: 9         Hospital Follow-up Visit:    Hospital/Nursing Home/IP Rehab Facility: [facility], then to [place] 2 days after admission  Date of Admission: 8/16/22  Date of Discharge: 8/22/22  Reason(s) for Admission: suicidal attempt, depression     Was your hospitalization related to COVID-19? No   Problems taking medications regularly:  None  Medication changes since discharge: Buspar, hydroxyzine   Problems adhering to non-medication therapy:  None    Summary of hospitalization:  [facility] discharge summary reviewed  Diagnostic Tests/Treatments reviewed.  Follow up needed: none  Other Healthcare Providers Involved in Patient s Care:         Specialist appointment - psychiatrist and therapist  Update since discharge: improved.   Post Medication Reconciliation Status: Discharge medications reconciled and changed, see notes/orders      Plan of care communicated with patient     Patient was recently hospitalized for suicidal attempt.  States that she is doing better.  Currently having harder time sleeping.  Try taking melatonin 10 mg at bedtime.  History of taking Ambien or Xanax in the past.  Ended up recently overdosing on Ambien.  Currently  having harder time falling asleep.  Wondering what her options are.  Currently taking hydroxyzine 25 mg twice daily for anxiety.  Does not tend to make her sleepy.  Currently following with psychiatry and therapist.  No plans of leaving here today and harming herself.  No recent suicidal thoughts or plans.    Dr. Apurva ray  See Shannan Poe    Patient was recently seen for a bladder infection.  States that she still has burning with urination.  Last dose of the antibiotic was this past evening.  Urine culture was positive.  No pregnancy or STD concerns however she is fine with being screened today for STD concerns.  Has an IUD.  No frequency, urgency, hematuria, fevers, chills.  No rashes.    Review of Systems   Constitutional, HEENT, cardiovascular, pulmonary, gi and gu systems are negative, except as otherwise noted.      Objective    /68 (BP Location: Left arm, Patient Position: Sitting, Cuff Size: Adult Large)   Pulse 64   Temp 97.5  F (36.4  C) (Tympanic)   Resp 16   Wt 91 kg (200 lb 9.6 oz)   LMP 08/22/2022   SpO2 97%   Breastfeeding No   BMI 34.43 kg/m    Body mass index is 34.43 kg/m .  Physical Exam  Vitals and nursing note reviewed.   Constitutional:       General: She is not in acute distress.     Appearance: Normal appearance. She is well-developed.   HENT:      Head: Normocephalic and atraumatic.   Cardiovascular:      Rate and Rhythm: Normal rate and regular rhythm.      Heart sounds: Normal heart sounds, S1 normal and S2 normal. No murmur heard.    No friction rub. No S3 or S4 sounds.   Pulmonary:      Effort: Pulmonary effort is normal. No respiratory distress.      Breath sounds: Normal breath sounds. No wheezing or rales.   Abdominal:      General: Abdomen is flat. Bowel sounds are normal.      Palpations: Abdomen is soft. There is no mass.      Tenderness: There is no abdominal tenderness. There is no right CVA tenderness, left CVA tenderness, guarding or rebound.       Hernia: No hernia is present.   Musculoskeletal:         General: Normal range of motion.   Skin:     General: Skin is warm and dry.      Findings: No rash.   Neurological:      General: No focal deficit present.      Mental Status: She is alert and oriented to person, place, and time.   Psychiatric:         Attention and Perception: Attention and perception normal.         Mood and Affect: Mood and affect normal.         Speech: Speech normal.         Behavior: Behavior normal. Behavior is cooperative.         Thought Content: Thought content normal. Thought content does not include homicidal or suicidal ideation. Thought content does not include homicidal or suicidal plan.         Cognition and Memory: Cognition and memory normal.         Judgment: Judgment normal.

## 2022-09-12 NOTE — PATIENT INSTRUCTIONS
Bladder infection:   Antibiotic has been sent to pharmacy. Please take full course of antibiotic even if symptoms have completely resolved. This helps prevent against antibiotic resistance.     We will culture the urine to see what bacteria grows out of the urine.  We will call if a change of antibiotic is necessary per the culture.  Please increase fluids including water and cranberry juice.  Monitor for fevers, chills, back pain.  Return to clinic after antibiotic completion if symptoms are not resolved.  Call clinic if symptoms change/worsen.    Anxiety:   Encouraged good diet and exercise.   Encouraged to see a counselor.   Return in 4-6 weeks for recheck.   Return to clinic with change/worsening of symptoms.       Suicide [facility] First call for help:  [phone]    Depression: Tips to Help Yourself  As your healthcare providers help treat your depression, you can also help yourself. Keep in mind that your illness affects you emotionally, physically, mentally, and socially. So full recovery will take time. Take care of your body and your soul, and be patient with yourself as you get better.    Self-care  Educate yourself. Read about treatment and medicine options. If you have the energy, attend local conferences or support groups. Keep a list of useful websites and helpful books and use them as needed. This illness is not your fault. Don t blame yourself for your depression.  Manage early symptoms. If you notice symptoms returning, experience triggers, or identify other factors that may lead to a depressive episode, get help as soon as possible. Ask trusted friends and family to monitor your behavior and let you know if they see anything of concern.  Work with your provider. Find a provider you can trust. Communicate honestly with that person and share information on your treatment for depression and your reaction to medicines.  Be prepared for a crisis. Know what to do if you experience a  crisis. Keep the phone number of a crisis hotline and know the location of your community's urgent care centers and the closest emergency department.  Hold off on big decisions. Depression can cloud your judgment. So wait until you feel better before making major life decisions, such as changing jobs, moving, or getting  or .  Be patient. Recovering from depression is a process. Don t be discouraged if it takes some time to feel better.  Keep it simple. Depression saps your energy and concentration. So you won t be able to do all the things you used to do. Set small goals and do what you can.  Be with others. Don t isolate yourself--you ll only feel worse. Try to be with other people. And take part in fun activities when you can. Go to a movie, [employer], [employer] service, or social event. Talk openly with people you can trust. And accept help when it s offered.  Take care of your body  People with depression often lose the desire to take care of themselves. That only makes their problems worse. During treatment and afterward, make a point to:  Exercise. It s a great way to take care of your body. And studies have shown that exercise helps fight depression.  Avoid drugs and alcohol. These may ease the pain in the short term. But they ll only make your problems worse in the long run.  Get relief from stress. Ask your healthcare provider for relaxation exercises and techniques to help relieve stress.  Eat right. A balanced and healthy diet helps keep your body healthy.  Date Last Reviewed: 1/1/2017 2000-2017 [employer]. [address] [place] [address], [place] [address]. All rights reserved. This information is not intended as a substitute for professional medical care. Always follow your healthcare professional's instructions.         Treating Anxiety Disorders with Therapy    If you have an anxiety disorder, you don t have to suffer anymore. Treatment is available. Therapy (also called  counseling) is often a helpful treatment for anxiety disorders. With therapy, a specially trained professional (therapist) helps you face and learn to manage your anxiety. Therapy can be short-term or long-term depending on your needs. In some cases, medicine may also be prescribed with therapy. It may take time before you notice how much therapy is helping, but stick with it. With therapy, you can feel better.  Cognitive behavioral therapy (CBT)  Cognitive behavioral therapy (CBT) teaches you to manage anxiety. It does this by helping you understand how you think and act when you re anxious. Research has shown CBT to be a very effective treatment for anxiety disorders. How CBT is run is almost like a class. It involves homework and activities to build skills that teach you to cope with anxiety step by step. It can be done in a group or one-on-one, and often takes place for a set number of sessions. CBT has two main parts:  Cognitive therapy helps you identify the negative, irrational thoughts that occur with your anxiety. You ll learn to replace these with more positive, realistic thoughts.  Behavioral therapy helps you change how you react to anxiety. You ll learn coping skills and methods for relaxing to help you better deal with anxiety.  Other forms of therapy  Other therapy methods may work better for you than CBT. Or, you may move from CBT to another form of therapy as your treatment needs change. This may mean meeting with a therapist by yourself or in a group. Therapy can also help you work through problems in your life, such as drug or alcohol dependence, that may be making your anxiety worse.  Getting better takes time  Therapy will help you feel better and teach you skills to help manage anxiety long term. But change doesn t happen right away. It takes a commitment from you. And treatment only works if you learn to face the causes of your anxiety. So, you might feel worse before you feel better. This can  sometimes make it hard to stick with it. But remember: Therapy is a very effective treatment. The results will be well worth it.  Helping yourself  If anxiety is wearing you down, here are some things you can do to cope:  Check with your doctor and rule out any physical problems that may be causing the anxiety symptoms.  If an anxiety disorder is diagnosed, seek mental healthcare. This is an illness and it can respond to treatment. Most types of anxiety disorders will respond to talk therapy and medicine.  Educate yourself about anxiety disorders. Keep track of helpful online resources and books you can use during stressful periods.  Try stress management techniques such as meditation.  Consider online or in-person support groups.  Don t fight your feelings. Anxiety feeds itself. The more you worry about it, the worse it gets. Instead, try to identify what might have triggered your anxiety. Then try to put this threat in perspective.  Keep in mind that you can t control everything about a situation. Change what you can and let the rest take its course.  Exercise -- it s a great way to relieve tension and help your body feel relaxed.  Examine your life for stress, and try to find ways to reduce it.  Avoid caffeine and nicotine, which can make anxiety symptoms worse.  Fight the temptation to turn to alcohol or unprescribed drugs for relief. They only make things worse in the long run.   Date Last Reviewed: 1/1/2017 2000-2017 [employer]. [address] [place] [address]. All rights reserved. This information is not intended as a substitute for professional medical care. Always follow your healthcare professional's instructions.          Counselors:     [facility]: [phone]    Kelsea Vidal: [phone]    Robina Huerta: [phone]    Bam Vidal CNP, [facility]: [phone]    Staci Cardona: [phone]    [facility]:  [phone]    Jose Martin Porter: [phone]    [phone]    [facility]: [phone]    Madiha Pavon: [phone]    [facility]: [phone]

## 2022-09-12 NOTE — NURSING NOTE
Pt presents to clinic today for a hospital follow up from 8/16-8/22/22 for a suicide attempt.   Patient states she feels fine.     FOOD SECURITY SCREENING QUESTIONS:    The next two questions are to help us understand your food security.  If you are feeling you need any assistance in this area, we have resources available to support you today.    Hunger Vital Signs:  Within the past 12 months we worried whether our food would run out before we got money to buy more. Never  Within the past 12 months the food we bought just didn't last and we didn't have money to get more. Never        Medication Reconciliation: Sae Kim LPN,LPN on 9/12/2022 at 11:28 AM

## 2022-09-12 NOTE — LETTER
My Depression Action Plan  Name: Ilana Aguilar   Date of Birth 1985  Date: 9/12/2022    My doctor: No Ref-Primary, Physician   My clinic: [facility]  [address]  [place] [id number] [phone]          GREEN    ZONE   Good Control    What it looks like:     Things are going generally well. You have normal ups and downs. You may even feel depressed from time to time, but bad moods usually last less than a day.   What you need to do:  1. Continue to care for yourself (see self care plan)  2. Check your depression survival kit and update it as needed  3. Follow your physician s recommendations including any medication.  4. Do not stop taking medication unless you consult with your physician first.           YELLOW         ZONE Getting Worse    What it looks like:     Depression is starting to interfere with your life.     It may be hard to get out of bed; you may be starting to isolate yourself from others.    Symptoms of depression are starting to last most all day and this has happened for several days.     You may have suicidal thoughts but they are not constant.   What you need to do:     1. Call your care team. Your response to treatment will improve if you keep your care team informed of your progress. Yellow periods are signs an adjustment may need to be made.     2. Continue your self-care.  Just get dressed and ready for the day.  Don't give yourself time to talk yourself out of it.    3. Talk to someone in your support network.    4. Open up your Depression Self-Care Plan/Wellness Kit.           RED    ZONE Medical Alert - Get Help    What it looks like:     Depression is seriously interfering with your life.     You may experience these or other symptoms: You can t get out of bed most days, can t work or engage in other necessary activities, you have trouble taking care of basic hygiene, or basic responsibilities, thoughts of suicide or death that  will not go away, self-injurious behavior.     What you need to do:  1. Call your care team and request a same-day appointment. If they are not available (weekends or after hours) call your local crisis line, emergency room or 911.          Depression Self-Care Plan / Wellness Kit    Many people find that medication and therapy are helpful treatments for managing depression. In addition, making small changes to your everyday life can help to boost your mood and improve your wellbeing. Below are some tips for you to consider. Be sure to talk with your medical provider and/or behavioral health consultant if your symptoms are worsening or not improving.     Sleep   Sleep hygiene  means all of the habits that support good, restful sleep. It includes maintaining a consistent bedtime and wake time, using your bedroom only for sleeping or sex, and keeping the bedroom dark and free of distractions like a computer, smartphone, or television.     Develop a Healthy Routine  Maintain good hygiene. Get out of bed in the morning, make your bed, brush your teeth, take a shower, and get dressed. Don t spend too much time viewing media that makes you feel stressed. Find time to relax each day.    Exercise  Get some form of exercise every day. This will help reduce pain and release endorphins, the  feel good  chemicals in your brain. It can be as simple as just going for a walk or doing some gardening, anything that will get you moving.      Diet  Strive to eat healthy foods, including fruits and vegetables. Drink plenty of water. Avoid excessive sugar, caffeine, alcohol, and other mood-altering substances.     Stay Connected with Others  Stay in touch with friends and family members.    Manage Your Mood  Try deep breathing, massage therapy, biofeedback, or meditation. Take part in fun activities when you can. Try to find something to smile about each day.     Psychotherapy  Be open to working with a therapist if your provider  recommends it.     Medication  Be sure to take your medication as prescribed. Most anti-depressants need to be taken every day. It usually takes several weeks for medications to work. Not all medicines work for all people. It is important to follow-up with your provider to make sure you have a treatment plan that is working for you. Do not stop your medication abruptly without first discussing it with your provider.    Crisis Resources   These hotlines are for both adults and children. They and are open 24 hours a day, 7 days a week unless noted otherwise.      National Suicide Prevention Lifeline   988 or 7-121-045-UIXS (0356)      Crisis Text Line    www.crisistextline.org  Text HOME to 128607 from anywhere in the [place], anytime, about any type of crisis. A live, trained crisis counselor will receive the text and respond quickly.      [place] Lifeline for LGBTQ Youth  A national crisis intervention and suicide lifeline for LGBTQ youth under 25. Provides a safe place to talk without judgement. Call 1-846.775.1408; text START to 448708 or visit www.thetrevorproject.org to talk to a trained counselor.      For [facility] crisis numbers, visit the [facility] website at:  https://[place].gov/dhs/people-we-serve/adults/health-care/mental-health/resources/crisis-contacts.jsp

## 2022-09-13 LAB — BACTERIA UR CULT: NORMAL

## 2022-09-17 ENCOUNTER — HEALTH MAINTENANCE LETTER (OUTPATIENT)
Age: 37
End: 2022-09-17

## 2022-10-31 ENCOUNTER — OFFICE VISIT (OUTPATIENT)
Dept: FAMILY MEDICINE | Facility: OTHER | Age: 37
End: 2022-10-31
Attending: STUDENT IN AN ORGANIZED HEALTH CARE EDUCATION/TRAINING PROGRAM
Payer: COMMERCIAL

## 2022-10-31 VITALS
OXYGEN SATURATION: 99 % | BODY MASS INDEX: 33.86 KG/M2 | DIASTOLIC BLOOD PRESSURE: 76 MMHG | WEIGHT: 197.25 LBS | HEART RATE: 86 BPM | RESPIRATION RATE: 18 BRPM | SYSTOLIC BLOOD PRESSURE: 130 MMHG | TEMPERATURE: 97.4 F

## 2022-10-31 DIAGNOSIS — H10.9 BACTERIAL CONJUNCTIVITIS OF LEFT EYE: ICD-10-CM

## 2022-10-31 DIAGNOSIS — H60.393 INFECTIVE OTITIS EXTERNA, BILATERAL: ICD-10-CM

## 2022-10-31 DIAGNOSIS — H66.006 RECURRENT ACUTE SUPPURATIVE OTITIS MEDIA WITHOUT SPONTANEOUS RUPTURE OF TYMPANIC MEMBRANE OF BOTH SIDES: Primary | ICD-10-CM

## 2022-10-31 PROCEDURE — 99213 OFFICE O/P EST LOW 20 MIN: CPT | Performed by: STUDENT IN AN ORGANIZED HEALTH CARE EDUCATION/TRAINING PROGRAM

## 2022-10-31 RX ORDER — ESZOPICLONE 2 MG/1
2 TABLET, FILM COATED ORAL AT BEDTIME
Status: ON HOLD | COMMUNITY
Start: 2022-10-17 | End: 2022-11-29

## 2022-10-31 RX ORDER — VORTIOXETINE 5 MG/1
5 TABLET, FILM COATED ORAL DAILY
Status: ON HOLD | COMMUNITY
Start: 2022-10-19 | End: 2022-12-18

## 2022-10-31 RX ORDER — CIPROFLOXACIN AND DEXAMETHASONE 3; 1 MG/ML; MG/ML
4 SUSPENSION/ DROPS AURICULAR (OTIC) 2 TIMES DAILY
Qty: 2 ML | Refills: 0 | Status: SHIPPED | OUTPATIENT
Start: 2022-10-31 | End: 2022-11-05

## 2022-10-31 RX ORDER — DEXTROAMPHETAMINE SACCHARATE, AMPHETAMINE ASPARTATE, DEXTROAMPHETAMINE SULFATE AND AMPHETAMINE SULFATE 5; 5; 5; 5 MG/1; MG/1; MG/1; MG/1
20 TABLET ORAL 2 TIMES DAILY
Status: ON HOLD | COMMUNITY
Start: 2022-10-17 | End: 2022-11-29

## 2022-10-31 RX ORDER — ERYTHROMYCIN 5 MG/G
0.5 OINTMENT OPHTHALMIC 4 TIMES DAILY
Qty: 8 G | Refills: 0 | Status: SHIPPED | OUTPATIENT
Start: 2022-10-31 | End: 2022-11-04

## 2022-10-31 ASSESSMENT — PAIN SCALES - GENERAL: PAINLEVEL: MILD PAIN (3)

## 2022-10-31 NOTE — NURSING NOTE
Patient presents to clinic experiencing Bilateral ear drainage, pain rated 3, decreased hearing and left eye red, swollen.  She states this started 3 days ago and occurs every year.    Medication Rec Complete  Matilde Reynolds LPN............10/31/2022 3:15 PM

## 2022-10-31 NOTE — PROGRESS NOTES
"  Assessment & Plan     Recurrent acute suppurative otitis media without spontaneous rupture of tympanic membrane of both sides  Recurrent issue. Given the bacterial conjunctivitis as below will start augmentin. Reviewed benefits vs risks and side effects of medication and patient in agreement to start taking.   - amoxicillin-clavulanate (AUGMENTIN) 875-125 MG tablet; Take 1 tablet by mouth 2 times daily for 7 days  - Adult ENT  Referral; Future    Infective otitis externa, bilateral  Purulent drainage and boggy edematous canal bilaterally  - ciprofloxacin-dexamethasone (CIPRODEX) 0.3-0.1 % otic suspension; Place 4 drops into both ears 2 times daily for 5 days  - Adult ENT  Referral; Future    Bacterial conjunctivitis of left eye  Exam and symptoms consistent with bacterial conjunctivitis. Discussed supportive cares, getting rid of any eye makeup up.   - amoxicillin-clavulanate (AUGMENTIN) 875-125 MG tablet; Take 1 tablet by mouth 2 times daily for 7 days  - erythromycin (ROMYCIN) 5 MG/GM ophthalmic ointment; Place 0.5 inches Into the left eye 4 times daily for 4 days             BMI:   Estimated body mass index is 33.86 kg/m  as calculated from the following:    Height as of 8/16/22: 1.626 m (5' 4\").    Weight as of this encounter: 89.5 kg (197 lb 4 oz).           No follow-ups on file.    Jessica Soler MD  [facility] AND \[facility]\""   Ilana is a 37 year old, presenting for the following health issues:  Ear Problem (Bilateral ear drainage, pain rated 3, decreased hearing, left eye red, swollen)      HPI     Bilateral ears draining  - has been going on for a few days  - throat irritated and coughing   - ears plugged  - this am left eye crusted and closed   - no eye pain or change in vision  - tylenol and ibuprofen help with pain   - no trauma to eye   - has about 3-4 ear infections/year but has not tolerated tube placement in ENT clinic           Review of Systems "   Constitutional, HEENT, cardiovascular, pulmonary, gi and gu systems are negative, except as otherwise noted.      Objective    /76 (BP Location: Right arm, Patient Position: Sitting, Cuff Size: Adult Regular)   Pulse 86   Temp 97.4  F (36.3  C) (Tympanic)   Resp 18   Wt 89.5 kg (197 lb 4 oz)   LMP  (LMP Unknown)   SpO2 99%   BMI 33.86 kg/m    Body mass index is 33.86 kg/m .  Physical Exam   GENERAL: healthy, alert and no distress  EYES: let conjunctiva red, crusting in upper left eyelashes, EOMI, PERRL  HENT: normal cephalic/atraumatic, right ear: bulging membrane, mucopurulent effusion, purulent drainage in canal and red and boggy canal, left ear: purulent drainage in canal and red and boggy canal due to edematous canal unable to visualize the TM, nose and mouth without ulcers or lesions, oropharynx clear and oral mucous membranes moist  RESP: lungs clear to auscultation - no rales, rhonchi or wheezes  CV: regular rate and rhythm, normal S1 S2, no S3 or S4, no murmur, click or rub, no peripheral edema and peripheral pulses strong  PSYCH: mentation appears normal, affect normal/bright

## 2022-11-08 ENCOUNTER — MYC REFILL (OUTPATIENT)
Dept: FAMILY MEDICINE | Facility: OTHER | Age: 37
End: 2022-11-08

## 2022-11-08 DIAGNOSIS — H10.9 BACTERIAL CONJUNCTIVITIS OF LEFT EYE: ICD-10-CM

## 2022-11-08 DIAGNOSIS — H60.393 INFECTIVE OTITIS EXTERNA, BILATERAL: ICD-10-CM

## 2022-11-08 DIAGNOSIS — H66.006 RECURRENT ACUTE SUPPURATIVE OTITIS MEDIA WITHOUT SPONTANEOUS RUPTURE OF TYMPANIC MEMBRANE OF BOTH SIDES: ICD-10-CM

## 2022-11-08 NOTE — TELEPHONE ENCOUNTER
"S-(situation): Patient requesting refills on recently prescribed antibiotics    B-(background): Patient was seen in clinic by Amol on 10/31/22 for infective otitis exeterna, bacterial conjunctivitis of left eye, and recurrent autre suppurative otitis media    A-(assessment): Patient states her ears no longer hurt but she feels like she is constantly \"under water\" with pressure. States occasional drainage does continue from ears.    R-(recommendations): Please advise if refills can be sent or if patient needs a followup appointment. Patient aware MD is out of office until 11/9/22 and prefers to wait for her return.    Corinne R Thayer, RN on 11/8/2022 at 11:57 AM      "

## 2022-11-09 RX ORDER — CIPROFLOXACIN AND DEXAMETHASONE 3; 1 MG/ML; MG/ML
4 SUSPENSION/ DROPS AURICULAR (OTIC) 2 TIMES DAILY
Qty: 2 ML | Refills: 0 | OUTPATIENT
Start: 2022-11-09

## 2022-11-14 ENCOUNTER — MYC REFILL (OUTPATIENT)
Dept: FAMILY MEDICINE | Facility: OTHER | Age: 37
End: 2022-11-14

## 2022-11-14 DIAGNOSIS — H60.393 INFECTIVE OTITIS EXTERNA, BILATERAL: ICD-10-CM

## 2022-11-14 DIAGNOSIS — H66.006 RECURRENT ACUTE SUPPURATIVE OTITIS MEDIA WITHOUT SPONTANEOUS RUPTURE OF TYMPANIC MEMBRANE OF BOTH SIDES: ICD-10-CM

## 2022-11-14 DIAGNOSIS — H10.9 BACTERIAL CONJUNCTIVITIS OF LEFT EYE: ICD-10-CM

## 2022-11-14 RX ORDER — CIPROFLOXACIN AND DEXAMETHASONE 3; 1 MG/ML; MG/ML
4 SUSPENSION/ DROPS AURICULAR (OTIC) 2 TIMES DAILY
Qty: 2 ML | Refills: 0 | Status: CANCELLED | OUTPATIENT
Start: 2022-11-14

## 2022-11-15 NOTE — TELEPHONE ENCOUNTER
Per 11/8/22 encounter in chart, patient needs to be seen prior to prescribing additional antibiotics. Message sent to patient    Corinne R Thayer, RN on 11/15/2022 at 9:03 AM

## 2022-11-22 ENCOUNTER — TRANSFERRED RECORDS (OUTPATIENT)
Dept: HEALTH INFORMATION MANAGEMENT | Facility: OTHER | Age: 37
End: 2022-11-22

## 2022-11-22 ENCOUNTER — OFFICE VISIT (OUTPATIENT)
Dept: FAMILY MEDICINE | Facility: OTHER | Age: 37
End: 2022-11-22
Attending: NURSE PRACTITIONER
Payer: COMMERCIAL

## 2022-11-22 VITALS
DIASTOLIC BLOOD PRESSURE: 74 MMHG | HEART RATE: 88 BPM | OXYGEN SATURATION: 98 % | BODY MASS INDEX: 33.3 KG/M2 | SYSTOLIC BLOOD PRESSURE: 120 MMHG | WEIGHT: 194 LBS | TEMPERATURE: 97.6 F | RESPIRATION RATE: 17 BRPM

## 2022-11-22 DIAGNOSIS — H72.91 OTITIS MEDIA OF RIGHT EAR WITH RUPTURE OF TYMPANIC MEMBRANE: Primary | ICD-10-CM

## 2022-11-22 DIAGNOSIS — H66.93 OTITIS MEDIA TREATED WITH ANTIBIOTICS IN THE PAST 60 DAYS, BILATERAL: ICD-10-CM

## 2022-11-22 DIAGNOSIS — H66.91 OTITIS MEDIA OF RIGHT EAR WITH RUPTURE OF TYMPANIC MEMBRANE: Primary | ICD-10-CM

## 2022-11-22 PROCEDURE — 99213 OFFICE O/P EST LOW 20 MIN: CPT | Performed by: NURSE PRACTITIONER

## 2022-11-22 RX ORDER — CEFDINIR 300 MG/1
300 CAPSULE ORAL 2 TIMES DAILY
Qty: 14 CAPSULE | Refills: 0 | Status: ON HOLD | OUTPATIENT
Start: 2022-11-22 | End: 2022-12-18

## 2022-11-22 ASSESSMENT — ENCOUNTER SYMPTOMS
SORE THROAT: 1
CARDIOVASCULAR NEGATIVE: 1
FACIAL SWELLING: 0
COUGH: 0
RHINORRHEA: 1
DIAPHORESIS: 0
MUSCULOSKELETAL NEGATIVE: 1
FEVER: 0
VOMITING: 0
CHILLS: 0
DIARRHEA: 0
NAUSEA: 0
GASTROINTESTINAL NEGATIVE: 1
FATIGUE: 0

## 2022-11-22 ASSESSMENT — PAIN SCALES - GENERAL: PAINLEVEL: MILD PAIN (3)

## 2022-11-22 NOTE — PROGRESS NOTES
"ASSESSMENT/PLAN  1. Otitis media of right ear with rupture of tympanic membrane  2. Otitis media treated with antibiotics in the past 60 days, bilateral  Patient presents today for ongoing bilateral ear pain.  On exam she does have bilateral acute otitis media, with right ear rupture of TM.  She completed full course of antibiotics after being seen on 10/31/2022.  She was treated with Augmentin.  Was also treated for otitis externa.  I would recommend further treatment with oral cefdinir twice a day for the next 7 days.  She does have follow-up with ENT next week.  I encouraged her to keep this appointment.  I recommend use of Tylenol ibuprofen to help with pain management inflammation.  I also recommend trialing an over-the-counter nondrowsy antihistamine to help with drainage.  She knows to return should she have any persistent or worsening symptoms.  Patient verbalizes understanding and agrees with plan of care.  - cefdinir (OMNICEF) 300 MG capsule; Take 1 capsule (300 mg) by mouth 2 times daily for 7 days  Dispense: 14 capsule; Refill: 0      BMI:   Estimated body mass index is 33.3 kg/m  as calculated from the following:    Height as of 8/16/22: 1.626 m (5' 4\").    Weight as of this encounter: 88 kg (194 lb).     JOHNATHAN Medrano.P., R.N., NP  [facility] AND [facility]    Christina Preciado is a 37 year old presenting for the following health issues:  Ent Problem    HPI     Patient is experiencing bilateral ear pain today.  She has history of recurrent acute otitis media.  Patient was in clinic on 10/31/2022 for bilateral ear pain and treated for bilateral acute otitis media and acute otitis externa as well as bacterial conjunctivitis.   Today patient feels like there is water in bilateral ears, she feels unsteady and unbalanced.  She has not noticed any draining.    She did notice improvedment after completion of full course of antibiotics however her ear pain and discomfort never completely " resolved.  Today she does note that she has a scratchy throat.  She denies any upper respiratory symptoms such as cough or wheezing.  She has felt hot but no recorded fever.  She denies any chills or body aches.  States her appetite is okay, no nausea, vomiting or diarrhea.    Review of Systems   Constitutional: Negative for chills, diaphoresis, fatigue and fever.   HENT: Positive for congestion, ear pain, rhinorrhea and sore throat. Negative for facial swelling and postnasal drip.    Respiratory: Negative for cough.    Cardiovascular: Negative.  Negative for chest pain.   Gastrointestinal: Negative.  Negative for diarrhea, nausea and vomiting.   Genitourinary: Negative.    Musculoskeletal: Negative.          Objective    /74   Pulse 88   Temp 97.6  F (36.4  C)   Resp 17   Wt 88 kg (194 lb)   LMP  (LMP Unknown)   SpO2 98%   BMI 33.30 kg/m    Body mass index is 33.3 kg/m .  Physical Exam  Vitals reviewed.   Constitutional:       Appearance: Normal appearance. She is not ill-appearing or toxic-appearing.   HENT:      Head: Normocephalic and atraumatic.      Right Ear: External ear normal. No decreased hearing noted. Tenderness present. No mastoid tenderness. Tympanic membrane is perforated, erythematous and bulging.      Left Ear: External ear normal. No decreased hearing noted. Tenderness present. A middle ear effusion is present. There is impacted cerumen. Tympanic membrane is erythematous and bulging.      Nose: Rhinorrhea present.      Mouth/Throat:      Pharynx: No oropharyngeal exudate or posterior oropharyngeal erythema.   Eyes:      Conjunctiva/sclera: Conjunctivae normal.   Cardiovascular:      Rate and Rhythm: Normal rate and regular rhythm.      Pulses: Normal pulses.      Heart sounds: Normal heart sounds. No murmur heard.  Pulmonary:      Effort: Pulmonary effort is normal. No respiratory distress.      Breath sounds: Normal breath sounds. No stridor.   Skin:     Capillary Refill: Capillary  refill takes less than 2 seconds.      Findings: No rash.   Neurological:      General: No focal deficit present.      Mental Status: She is alert.   Psychiatric:         Mood and Affect: Mood normal.         Behavior: Behavior normal.         Thought Content: Thought content normal.

## 2022-11-22 NOTE — NURSING NOTE
"Chief Complaint   Patient presents with     Ent Problem   Patient is here for ear pain. Patient was treated for ear infection x 2 weeks ago and finished antibiotics and drops,. Patient is still having pain and fluid in ears.   Initial /74   Pulse 88   Temp 97.6  F (36.4  C)   Resp 17   Wt 88 kg (194 lb)   LMP  (LMP Unknown)   SpO2 98%   BMI 33.30 kg/m   Estimated body mass index is 33.3 kg/m  as calculated from the following:    Height as of 8/16/22: 1.626 m (5' 4\").    Weight as of this encounter: 88 kg (194 lb).  Medication Reconciliation: complete        Mable Garcia LPN  "

## 2022-11-22 NOTE — PATIENT INSTRUCTIONS
IBU 600mg to 800mg every 6- 8 hrs for inflammation and pain  Tylenol extra strength 1000mg every 8 hrs as needed for pain    Try allergy pill for next 5-7 days, once daily

## 2022-11-27 ENCOUNTER — TRANSFERRED RECORDS (OUTPATIENT)
Dept: HEALTH INFORMATION MANAGEMENT | Facility: CLINIC | Age: 37
End: 2022-11-27

## 2022-11-27 LAB
CREATININE (EXTERNAL): 0.95 MG/DL (ref 0.4–1)
GFR ESTIMATED (EXTERNAL): 80 ML/MIN/1.73M2
GLUCOSE (EXTERNAL): 85 MG/DL (ref 70–99)
POTASSIUM (EXTERNAL): 4 MEQ/L (ref 3.4–5.1)
TSH SERPL-ACNC: 0.9 UIU/ML (ref 0.4–3.99)

## 2022-11-28 ENCOUNTER — HOSPITAL ENCOUNTER (INPATIENT)
Facility: HOSPITAL | Age: 37
LOS: 21 days | Discharge: HOME | End: 2022-12-19
Attending: STUDENT IN AN ORGANIZED HEALTH CARE EDUCATION/TRAINING PROGRAM | Admitting: STUDENT IN AN ORGANIZED HEALTH CARE EDUCATION/TRAINING PROGRAM
Payer: COMMERCIAL

## 2022-11-28 ENCOUNTER — TELEPHONE (OUTPATIENT)
Dept: BEHAVIORAL HEALTH | Facility: HOSPITAL | Age: 37
End: 2022-11-28

## 2022-11-28 DIAGNOSIS — F31.2 BIPOLAR AFFECTIVE DISORDER, CURRENTLY MANIC, SEVERE, WITH PSYCHOTIC FEATURES (H): Primary | ICD-10-CM

## 2022-11-28 PROCEDURE — 124N000001 HC R&B MH

## 2022-11-28 RX ORDER — RISPERIDONE 0.5 MG/1
1-2 TABLET ORAL 2 TIMES DAILY
Status: ON HOLD | COMMUNITY
Start: 2022-11-25 | End: 2022-12-18

## 2022-11-28 RX ORDER — ACETAMINOPHEN 325 MG/1
650 TABLET ORAL EVERY 4 HOURS PRN
Status: DISCONTINUED | OUTPATIENT
Start: 2022-11-28 | End: 2022-12-19 | Stop reason: HOSPADM

## 2022-11-28 RX ORDER — LANOLIN ALCOHOL/MO/W.PET/CERES
3 CREAM (GRAM) TOPICAL
Status: DISCONTINUED | OUTPATIENT
Start: 2022-11-28 | End: 2022-12-19 | Stop reason: HOSPADM

## 2022-11-28 RX ORDER — MAGNESIUM HYDROXIDE/ALUMINUM HYDROXICE/SIMETHICONE 120; 1200; 1200 MG/30ML; MG/30ML; MG/30ML
30 SUSPENSION ORAL EVERY 4 HOURS PRN
Status: DISCONTINUED | OUTPATIENT
Start: 2022-11-28 | End: 2022-12-19 | Stop reason: HOSPADM

## 2022-11-28 RX ORDER — OLANZAPINE 10 MG/2ML
10 INJECTION, POWDER, FOR SOLUTION INTRAMUSCULAR 3 TIMES DAILY PRN
Status: DISCONTINUED | OUTPATIENT
Start: 2022-11-28 | End: 2022-12-04

## 2022-11-28 RX ORDER — OLANZAPINE 10 MG/1
10 TABLET ORAL 3 TIMES DAILY PRN
Status: DISCONTINUED | OUTPATIENT
Start: 2022-11-28 | End: 2022-12-04

## 2022-11-28 RX ORDER — HYDROXYZINE HYDROCHLORIDE 25 MG/1
25 TABLET, FILM COATED ORAL EVERY 4 HOURS PRN
Status: DISCONTINUED | OUTPATIENT
Start: 2022-11-28 | End: 2022-12-10

## 2022-11-28 RX ORDER — AMOXICILLIN 250 MG
1 CAPSULE ORAL 2 TIMES DAILY PRN
Status: DISCONTINUED | OUTPATIENT
Start: 2022-11-28 | End: 2022-12-19 | Stop reason: HOSPADM

## 2022-11-28 RX ORDER — CLONIDINE HYDROCHLORIDE 0.1 MG/1
TABLET ORAL
Status: ON HOLD | COMMUNITY
Start: 2022-11-25 | End: 2022-12-18

## 2022-11-28 ASSESSMENT — ACTIVITIES OF DAILY LIVING (ADL)
DIFFICULTY_EATING/SWALLOWING: NO
ADLS_ACUITY_SCORE: 28
DOING_ERRANDS_INDEPENDENTLY_DIFFICULTY: NO
DRESSING/BATHING_DIFFICULTY: NO
WALKING_OR_CLIMBING_STAIRS_DIFFICULTY: NO
TOILETING_ISSUES: NO
CHANGE_IN_FUNCTIONAL_STATUS_SINCE_ONSET_OF_CURRENT_ILLNESS/INJURY: NO
WEAR_GLASSES_OR_BLIND: NO
ADLS_ACUITY_SCORE: 28
FALL_HISTORY_WITHIN_LAST_SIX_MONTHS: NO
ADLS_ACUITY_SCORE: 28
CONCENTRATING,_REMEMBERING_OR_MAKING_DECISIONS_DIFFICULTY: NO

## 2022-11-28 NOTE — TELEPHONE ENCOUNTER
S: Outside Facility [facility] ([facility]) calling at 1430.  37 year old/Female presenting with suicidal ideations, command hallucinations, possible catatonia    B: Pt arrived via EMS. Pt presents with suicidal ideations, command hallucinations, bizarre behavior.  Pt affect in ED: flat with delayed responses  Pt Dx: Major Depressive Disorder, Panic disorder with agoraphopia  Previous [facility] hx? Yes: last hospitalized [place]  August 16-22, 2022  Pt endorses SI. Pt denies SIB.   Pt denies HI. Pt endorses hallucinations.   Hx of suicide attempt? Yes: 3 previous attempts most recently in August of 2022  Hx of aggression, or current concerns for aggression this visit? No  Pt is prescribed medication. Pt is medication compliant  Pt endorses OP services: Most recently at [facility] 11/22/22 to present  CD concerns: Not currently. Has a history of ETOH and amphetamines, last used 52 days ago  Acute medical concerns: None at this time  Does Pt present with any of the following: assistive devices, insulin pump, J/G tube, catheter, CPAP, continuous IV, continuous O2, bariatric needs, ADA needs? No  Is Pt their own guardian? Is own guardian  Pt is ambulatory  Pt is  able to perform ADLs independently    A: Pt meets criteria for review for [facility] admission. Patient is voluntary.   COVID: Negative  Utox: Positive for amphetamines but is prescribed adderall  CMP: WNL  CBC: WNL  HCG: Negative    R: Patient accepted for behavioral bed placement: Yes        Accepted by Provider Mable Paris NP    Admission to Inpatient Level of Care is indicated due to:     1. Patient risk of severity of behavioral health disorder is appropriate to proposed level of care as indicated by:   a. Imminent risk of harm to self Yes describe Command hallucination urging suicide.   b. Imminent risk of harm to others No   And/or  Behavioral health disorder is present and appropriate for inpatient care with both of the following:   a.   Severe psychiatric, behavioral or other comorbid conditions: Yes describe History of MDD, Panic disorder with agoraphobia,   b.  Severe dysfunction in daily living is present: Yes describe : she is delayed with responses, catatonic symptoms  2.  Inpatient mental health services are necessary to meet patient needs based on:   a. Specific condition related to admission diagnosis is present and will  likely improve with treatment at an inpatient level of care: Yes  b. Specific condition related to admission diagnosis will likely deteriorate in the absence of treatment at an inpatient level of care: Yes    3.  Situation and expectations are appropriate for inpatient care, as indicated by  one of the following:     A. Patient is unwilling to participate in treatment voluntarily and requires treatment. No agrees to voluntary admission.   B. Is voluntary treatment at lower level of care feasible No  C. Around the clock medical and nursing care is for symptoms is required: Yes  D. Patient management at lower level of care is not appropriate and  biopsychosocial stressors may be contributing to clinical presentation. Yes

## 2022-11-28 NOTE — TELEPHONE ENCOUNTER
2:59 PM Intake received a call from Jan that patient will be admitted to [facility] with Carole. Intake completed indicia and requested fabiola.

## 2022-11-28 NOTE — PLAN OF CARE
"    Problem: Adult Behavioral Health Plan of Care  Goal: Patient-Specific Goal (Individualization)  Description: Patient will be compliant with treatment team recommendations and medication administrations during hospitalization.  Patient will remain independent with ADLs daily.   Patient will sleep 6-8 hours per night.  Patient will eat at least 50% of meals daily.   11/28/2022-placed in MHICU due to unpredictable behaviors.   Outcome: Not Progressing       ADMISSION NOTE    Reason for admission suicidal ideation with command hallucinations.  Safety concerns risk for self harm and possibility of unpredictable behaviors.  Risk for or history of violence denies history of violence.   Full skin assessment: no skin issues observed at time of admission    Patient arrived on unit from [facility] accompanied by security and EMS personnel on 11/28/2022  5:22 PM.   Status on arrival: uncooperative and mute initially    Patient given tour of unit and Welcome to  unit papers given to patient, wanding completed, belongings inventoried, and admission assessment completed.   Patient's legal status on arrival is voluntary. Appropriate legal rights discussed with and copy given to patient. Patient Bill of Rights discussed with and copy given to patient.   Patient denies SI, HI, and thoughts of self harm and contracts for safety while on unit.      Arrived on unit at 1722 from [facility] accompanied by security and EMS personnel to 5N. Pt initially uncooperative upon arrival onto the unit. When directed to leave stretcher pt remained with head turned to side, eyes closed, and wide grin. Again pt was directed to leave stretcher, pt shook head slightly as in \"no\". Due to pt's uncooperative behavior she was placed in a room in MHICU. Initially pt refused to leave stretcher once in room. After much encouragement pt did roll from stretcher onto bed. Pt continued to not respond to direction until writer pointed out that pt was here " "voluntarily because she wants help. Pt then opened eyes and begin to change into hospital scrubs without further issue. Denies suicidal ideation. Reports auditory hallucinations, when asked to describe these pt reports \"they're telling me I don't like bread.\"  Laughing and smiling inappropriately. When asked about initial presentation when arrived on unit, pt stated \"sometimes I can't respond.\" Writer questioned if pt is able to hear happenings around her, she stated \"yes\".  Pt requested to shower, and did so. Resting comfortably in bed. PTA medications completed from home medications found in pt belongings.   MIRELLA faxed to [employer] in [place] to obtain outpatient MH records.        Problem: Psychotic Signs/Symptoms  Goal: Improved Behavioral Control (Psychotic Signs/Symptoms)  Description: Patient will deny hallucinations by discharge.   Patient will remain in control of behaviors towards staff and peers during hospitalization.   Outcome: Not Progressing       Problem: Suicidal Behavior  Goal: Suicidal Behavior is Absent or Managed  Description: Patient will deny SI by discharge.   Patient will remain free from self harm/injury during hospitalization.   Patient will verbalize 3 coping skills by discharge.   Outcome: Progressing   Denies suicidal ideation. Free from self harm or injury this shift.       Face to face end of shift report to be communicated to oncoming RN.       "

## 2022-11-28 NOTE — CARE PLAN
"Prior to Admission Medication Reconciliation:     Medications added:   [] None  [x] As listed below:    Clonidine- started at [facility]    risperdal-started at [facility]     Medications deleted:   [x] None  [] As listed below:    Medications marked for review/removal by attending:  [x] None  [] As listed below:    Changes made to existing medications:   [x] None  [] Updated time of day, strengths and frequencies to most current.     Pertinent notes/medications patient takes different than prescribed:     Unable to review with patient. Pt \"sleeping\" each attempt for review. No concerns with med list. Fill dates reflect compliancy and PTA meds updated to the best of my ability without speaking with patient.     Last times/dates taken verified with patient:  [] Yes- completed myself  [] Did not review with patient. Rx verification only. Review completed by nursing.    [] Nurse completed no changes made (double checked entries)  [x] Unable to review with patient at this time:    Allergy review:    [x]Did not review: reviewed by nursing  []Allergies reviewed and updated if needed.     Medication reconciliation sources:   [x]Patient: attempted X3  []Patient family member/emergency contact: **  [x][facility] Report Review  [x]Epic Chart Review  [x]Care Everywhere review  []Pharmacy med list/phone call: **  [x]Outside meds dispense report:   [x]Fill dates reflect compliancy. No concerns.  []Fill dates do not reflect compliancy on the following medications:   []HomeCare medlist, Nursing home or Assisted Living MAR:  [x]Behavioral Health Provider:   [facility]    Last seen 10/17/22, missed appt 11/28    trintellix 5 mg qAM    lunesta 2 mg at bedtime     Hydroxyzine 25 mg tab 1 tab TID     adderall IR 20 mg 1 tab BID     Buspirone 10 mg 1 tab TID   [facility]     11/[facility]22 [facility] Stay    Clonidine 0.1 mg 1 tab daily at bedtime and 1/2 tab daily PRN anxiety    risperdal 0.5 mg 1-2 tabs BID     Documented prior to " admission at [facility]- buspar 10 mg TID, adderall BID, lunesta 2 mg at bedtime, atarax 25 mg TID, trintellix 5 mg daily    discharged from [facility] 11/27   []Other: **    Pharmacy desired at discharge: uses [facility]    Is patient on coumadin?   [x]No  []Yes      Fill dates and reported compliancy:  [] Patient reports compliancy on all scheduled medications.   [] Not applicable. Patient is not taking any maintenance medications at this time.   [] Fill dates do not coincide with compliancy, but patient reports compliancy.    [x] Did not review with patient. Cannot assess.     Historian accuracy:  [] Excellent- alert and oriented, understands why meds were prescribed and how to take, able to answer specifics  [] Good- alert and oriented, understands why meds were prescribed and how to take, some confusion   [] Fair- alert and oriented, doesn't know medications without list, cannot answer specifics about medications, but has a decent process for which to take at home  [] Poor- does not know medications, may not have a process to take at home, may be cognitively unable to review at this time  []Medication management done by family member or facility, no concerns about historian accuracy.   [x] Did not review with patient. Cannot assess.     Medication Management:  [x] Manages meds independently: per last med rec performed 8/16/22 by self   [] Family member/ other party manages meds/assists:  [] Meds managed by staff at facility  [] Meds set up by home care, family/other party helps administer  [] Meds set up by home care, self administers  [] Did not review with patient. Cannot assess.     Other medications aside from PTA:  [] Denies taking any other medications aside from those listed in PTA meds, this includes over-the-counter vitamins, supplements and analgesics.       Yuni Berger on 11/28/2022 at 3:31 PM       Notifying appropriate party of changes/additions/discrepancies:  []No pertinent changes made,  notification not necessary.   [] Notified attending provider via text page/phone call/sticky note or other:  [] Notified other:  [x] Medications have not been reconciled by a provider yet, notification not necessary  [] Pt is not admitted to floor yet or patient is boarding, PTA meds completed before admission.   Medications Prior to Admission   Medication Sig Dispense Refill Last Dose     hydrOXYzine (ATARAX) 25 MG tablet Take 25 mg by mouth 3 times daily        amphetamine-dextroamphetamine (ADDERALL) 20 MG tablet Take 20 mg by mouth 2 times daily        busPIRone (BUSPAR) 10 MG tablet Take 1 tablet (10 mg) by mouth 3 times daily 90 tablet 1      cefdinir (OMNICEF) 300 MG capsule Take 1 capsule (300 mg) by mouth 2 times daily for 7 days 14 capsule 0      cloNIDine (CATAPRES) 0.1 MG tablet Take 1 tablet (0.1 mg) by mouth once daily at bedtime. Take 1/2 tablet (0.05 mg) once daily as needed for anxiety.        eszopiclone (LUNESTA) 2 MG tablet Take 2 mg by mouth At Bedtime        risperiDONE (RISPERDAL) 0.5 MG tablet Take 1-2 tablets by mouth 2 times daily        TRINTELLIX 5 MG tablet Take 5 mg by mouth daily

## 2022-11-29 PROCEDURE — 124N000001 HC R&B MH

## 2022-11-29 PROCEDURE — 99223 1ST HOSP IP/OBS HIGH 75: CPT | Mod: AI | Performed by: NURSE PRACTITIONER

## 2022-11-29 RX ORDER — HYDROXYZINE HYDROCHLORIDE 25 MG/1
25 TABLET, FILM COATED ORAL 3 TIMES DAILY
Status: ON HOLD | COMMUNITY
End: 2022-12-18

## 2022-11-29 ASSESSMENT — ACTIVITIES OF DAILY LIVING (ADL)
ADLS_ACUITY_SCORE: 28

## 2022-11-29 NOTE — PROGRESS NOTES
"Patient was not cooperative for assessment. Most information was provided from prior medical notes.    Social Service Psychosocial Assessment    Presenting Problem:       The patient is a 37 year old female that was admitted for psychotic symptoms and Suicdial ideation.      Per ED notes:  Patient presented to the [facility] Emergency Department on 11/27/2022 with concerns for catatonia. Patient was tearful and intermittently giggles to self inappropriately. Patient reports she has been having auditory hallucinations for approx. 2 months, hears the voices \"of people from my past that I hurt\". Occasionally has command hallucinations telling her to kill herself. Patient reports this last occured yesterday. Endorses SI without plan or intent \"fleeting thoughts that drive by like a car, but then I want to jump out of it\".        The patient is their own guardian.     Last admit August 2022: The patient is a 36 year old female that presented to the ED due to a SI attempt by overdose.   Patient is her own guardian.    Marital Status:        Spouse / Children:          Not  / Has children - Mentioned they were taken away. Patient did not go into great detail of why her  Children were taken away, but she blames her family.     Psychiatric TX HX:        Per ED notes: Pt reports she has been at [facility] since 11/22. [facility] Staff - Erica [phone]:    Patient was last admitted to this unit August 2022.   Goes to [facility] for Medication Management and Therapy - Patient denies past inpatient hospitalizations.     Suicide Risk Assessment:    Patient denies SI for admit, has a hx of SI, the patient denies SI for today.    Last admit August 2022: The patient was SI with a overdose attempt, has has past SI, and endorses SI today \" I just want to die.\"     Access to Lethal Means (explain):   The patient denies access to lethal means.     Family Psych HX:   The patient shared her " "mom dies by SI.     A & Ox:   X 3    Medication Adherence:   Unknown, please refer to H&P     Medical Issues:   Unknown, please refer to H&P     Visual -Motor Functioning:   Good, no issues noticed at this time.     Communication Skills /Needs:   Good, no issues noticed at this time.     Ethnicity:   White                   Spirituality/Confucianism Affiliation:   Non denomination Yarsani                                 Clergy Request:   No     History:  None    Living Situation:   Lives with her dad and plans on going back there at discharge.     ADL s:  Independently     Education:  Graduated [employer] School, no other education or training     Financial Situation:   Pay from employer     Occupation:    Last admit August 2022:  [employer] (NOW)  -  in [facility] Ilana Aguilar   [phone]  [email or web address]    Leisure & Recreation:      Art, music, and reading.     Childhood History:       The patient said, \" Very Good.\"     Trauma Abuse HX:       Patient denies trauma or abuse     Relationship / Sexuality:   Heterosexual - not in a current relationship     Substance Use/ Abuse:     Reports she has been sober from meth and alcohol for 52 days.  Last admit August 2022:  Patient indicates she uses methamphetamine and alcohol.   Patient's utox was methamphetamine positive, positive for amphetamine and pateint is prscribed Adderall    Chemical Dependency Treatment HX:          Reports she has been sober from meth and alcohol for 52 days.      For Last admit August 2022: Patient indicates she uses methamphetamine and alcohol.   Patient's utox was methamphetamine positive, positive for amphetamine and pateint is prscribed Adderall    Legal Issues:   Patient answered, \" Not that I know of.\"     Significant Life Events:   Graduating High School, getting a divorce, and having her children.     Strengths:   Has current services, is in a " "safe place, and has a place to live.     Challenges /Limitation:   Current MH, SI, current stressors in her life in relation to having her kids taken away.     Patient Support Contact (Include name, relationship, number, and summary of conversation):      MIRELLA signed for her father Reggie Sands.     Interventions:          Vulnerable Adult/Child Report NA       Community-Based Programs Has some community resources @ [facility]       Medical/Dental Care - [facility] - in [facility]        Medication Management -  Dr. Aracelis Casey Ph.D, L.P., [facility]       Home Care - NA         CD Evaluation/Rule 25/Aftercare - UDS negative   Reports she has been sober from meth and alcohol for 52 days.       Individual Therapy - Therapist: Dr. Aracelis Casey, Ph.D, L.P. [facility]      Clergy Request NA       Housing/Placement - Was Living with her dad.       Case Management - Would benefit       Insurance Coverage - BCBS/BCBS OUT OF STATE, and  COMMERCIAL/IMCARE PMAP      Financial Assistance Might benefit       Commit/Rock Screening ???  Patient voluntary for admit.       Suicide Risk Assessment -   Patient denies SI for admit, has a hx of SI, the patient denies SI for today.   Last admit August 2022: The patient was SI with a overdose attempt, has has past SI, and endorses SI today \" I just want to die.\"       High Risk Safety Plan Talk to supports; Call crisis lines; Go to local ER if feeling suicidal.  "

## 2022-11-29 NOTE — H&P
"[facility] PSYCHIATRY   HISTORY AND PHYSICAL     ADMISSION DATA     Ilana Aguilar MRN# [id number]   Age: 37 year old YOB: 1985     Date of Admission: 11/28/2022  Primary Physician: No Ref-Primary, Physician        CHIEF COMPLAINT   Concerns for catatonia       HISTORY OF PRESENT ILLNESS     Per ED notes:  Patient is a 37 year old female presented to the [facility] Emergency Department on 11/27/2022 where she was BIB EMS from [facility] with concerns for catatonia.   Patient reportedly mutism/staring with occasional inappropriate laughter, 1 episode lasted approximately 2 hours. Patient reported she has been having auditory hallucinations for approx. 2 months, hears the voices \"of people from my past that I hurt\". Occasionally has command hallucinations telling her to kill herself.  Endorsed SI without plan or intent \"fleeting thoughts that drive by like a car, but then I want to jump out of it\".     Per ED crisis assessment: Patient alert and orientated x3, cooperative throughout the interview.  Tearful at times, intermittently giggles to self inappropriately.  Reported episodes of \"going in and out of consciousness,\" in which she \"closes her eyes and cannot move.\"  Noted her first episode of this was the day she presented in the ED.  [facility].  She sought treatment there herself, as she was struggling with her mental health, especially auditory hallucinations.  Reported stressors as being cheated on her  and getting a divorce, doing drugs and getting custody of her children taken away.  Reported she had been sober from meth and alcohol for 52 days.  Requested inpatient hospitalization stating \"clearly something is wrong and I need help.\"    She was admitted voluntarily to this inpatient behavioral health unit for further evaluation and management.    On psychiatric evaluation: Patient presents mute with eyes closed, opened her " "eyes briefly after being asked to.  Some smiling noted when humor used.  Nursing notes indicate similar presentation last night upon arrival.  Patient required multiple prompts to get off the stretcher, mutism and inappropriate smiling. Patient continued to not respond to direction until nursing pointed out that she was here voluntarily because she wants help. Patient then opened eyes and begin to change into hospital scrubs without further issue. When asked about initial presentation when arrived on unit, patient stated \"sometimes I can't respond\" though she did states she can hear happenings around her. Patient requested to shower, and did so. She has been eating 50-75% of meals since admission.     It should be noted that patient was admitted on this unit August 2022 after she attempted suicide by overdosing on Ambien. She was prescribed a stimulant while also using methamphetamine. While the stimulant and Ambien was discontinued during her last admission, since then she was started on Lunesta and re-started on Adderall. Other medications include trintillex, hydroxyzine and buspar. Clonidine 0.1 mg 1 tab daily at bedtime and 1/2 tab daily PRN anxiety and risperdal 0.5 mg 1-2 tabs BID was started 11/25/22 while patient was at [facility].      PSYCHIATRIC HISTORY   Patient has been at [facility] since 11/22. [facility] Staff - Erica [phone]:  Patient was last admitted to this unit August 2022 status post suicide attempt.   Goes to [facility] for medication management and therapy.         SUBSTANCE USE HISTORY   Reported in ED that she has been sober from meth and alcohol for 52 days. Utox positive for amphetamine though patient is prescribed Adderall.   She had admitted to using  methamphetamine and alcohol during her last admission in August.     SOCIAL HISTORY   , recently lost custody of her children.   Lives with her Father in [place]. Was working as  at " [employer] on her last admit in August. Unsure if patient is still employed.   High school graduate, no other education or training.   No known history of abuse or trauma.      FAMILY HISTORY   Family History   Problem Relation Age of Onset     Other - See Comments Mother         with traumatic brain injury,/Psychiatric illness,depression     Substance Abuse Mother         Alcohol/Drug,chemical dependency     Heart Disease Mother         Heart Disease,  of heart failure at age 40.     Unknown/Adopted Father         Unknown,Unknown to patient     Family History Negative Brother         Good Health     Genetic Disorder No family hx of         Genetic,Denies any family history of cancer, MI or thyroid disorders.         PAST MEDICAL HISTORY   Past Medical History:   Diagnosis Date     Acute vaginitis     History of bacterial vaginosis     Anogenital (venereal) warts     No Comments Provided     Encounter for insertion of intrauterine contraceptive device     16,Paragard Lot# [id number] 2020     Injury of left ankle     Left ankle - denied per patient     Personal history of other medical treatment (CODE)     ,  ( one AB at age 20)     Personal history of urinary calculi     No Comments Provided     Personal history of urinary infection     No Comments Provided     Scoliosis     No Comments Provided     Social phobia     No Comments Provided       Past Surgical History:   Procedure Laterality Date     OTHER SURGICAL HISTORY      [id number],COLPOSCOPY       Penicillins     MEDICATIONS   Prior to Admission medications    Medication Sig Start Date End Date Taking? Authorizing Provider   hydrOXYzine (ATARAX) 25 MG tablet Take 25 mg by mouth 3 times daily   Yes Reported, Patient   amphetamine-dextroamphetamine (ADDERALL) 20 MG tablet Take 20 mg by mouth 2 times daily 10/17/22   Reported, Patient   busPIRone (BUSPAR) 10 MG tablet Take 1 tablet (10 mg) by mouth 3 times daily 22    "Savannah Alcaraz PA-C   cefdinir (OMNICEF) 300 MG capsule Take 1 capsule (300 mg) by mouth 2 times daily for 7 days 11/22/22 11/29/22  Day, Aisha DUMONT NP   cloNIDine (CATAPRES) 0.1 MG tablet Take 1 tablet (0.1 mg) by mouth once daily at bedtime. Take 1/2 tablet (0.05 mg) once daily as needed for anxiety. 11/25/22   Reported, Patient   eszopiclone (LUNESTA) 2 MG tablet Take 2 mg by mouth At Bedtime 10/17/22   Reported, Patient   risperiDONE (RISPERDAL) 0.5 MG tablet Take 1-2 tablets by mouth 2 times daily 11/25/22   Reported, Patient   TRINTELLIX 5 MG tablet Take 5 mg by mouth daily 10/19/22   Reported, Patient        PHYSICAL EXAM/ROS     I have reviewed the physical exam as documented by the medical team at [facility] on 11/27/22 and agree with findings and assessment and have no additional findings to add at this time. The review of systems is negative other than noted in the HPI.       LABS   Admission labs reviewed and WNL.      MENTAL STATUS EXAM   Vitals: /74   Pulse 82   Temp 98.4  F (36.9  C) (Temporal)   Resp 20   Ht 1.626 m (5' 4\")   LMP  (LMP Unknown)   SpO2 98%   BMI 33.30 kg/m      Appearance:  dressed in hospital scrubs and slightly unkempt  Attitude:  uncooperative  Eye Contact:  poor , eyes closed  Mood:  unable to assess  Affect: flat  Speech:  mute  Psychomotor Behavior:  no evidence of tardive dyskinesia, dystonia, or tics  Thought Process:  evidence of thought blocking present  Associations:  no loose associations  Thought Content:  patient appears to be responding to internal stimuli  Insight:  limited - knows she needs help  Judgment:  limited  Oriented to:  unable to assess  Attention Span and Concentration: impaired   Recent and Remote Memory:  unable to assess  Language: unable to assess  Fund of Knowledge: appropriate  Muscle Strength and Tone: unable to assess  Gait and Station: unable to assess       ASSESSMENT     This is a 37 year old female with a PMH " of major depression and methamphetamine and alcohol use disorder previously admitted on this unit 8/2022 status post suicide attempt currently presenting with concerns for psychosis and catatonia with episodes in which the patient states she can hear, but cannot respond that started on 11/27/22. Patient was started on Clonidine and Risperdal 2 days prior to the onset. Will hold as they may be contributing factors. Patient notes worsening auditory hallucinations the past 2 months. Patient presents mute and uncooperative at times, question if volitional, certainly warrants continued monitoring. Unsure if patient has been abusing substances, she is prescribed Adderall. Given psychosis, will stop. Will further address medications after reassessing tomorrow. If patient does not clear and is agreeable, would reccommend first episode work-up.          DIAGNOSIS     1. 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 Primary)  2. Rule out catatonia  3. Rule out cluster B personality traits  4. Major depressive disorder, by history  5. Methamphetamine use disorder, by history  6. Alcohol use disorder, by history       PLAN     Location: Unit 5  Legal Status: Orders Placed This Encounter      Voluntary    Safety Assessment:    Behavioral Orders   Procedures     Code 1 - Restrict to Unit     Routine Programming     As clinically indicated     Status 15     Every 15 minutes.      PTA psychotropic medications held:     -Risperdal  -Adderall  -Clonidine  -Buspar  -Trintillex    PTA psychotropic medications continued/changed:     -None    New medications initiated:     -Standard unit PRNs for comfort and safety including Zyprexa PRN    Programming: Patient will be treated in a therapeutic milieu with appropriate individual and group therapies. Education will be provided on diagnoses, medications, and treatments.     Medical diagnoses:  Per medicine    Consult: None  Tests: None    Anticipated LOS: 3-5  days  Disposition: Home with patient's father     Justification for hospitalization: reasons for hospitalization include potential safety risk to self or others within the last week, decreased functioning in outpatient setting and in the setting of no outpatient management, need for highly structured inpatient management for stabilization of psychiatric symptoms, need for psychiatric medication initiation and stabilization.       ATTESTATION      La Porter NP

## 2022-11-29 NOTE — PLAN OF CARE
"Face to face shift report received from MINNIE Kessler.       Problem: Adult Behavioral Health Plan of Care  Goal: Patient-Specific Goal (Individualization)  Description: Patient will be compliant with treatment team recommendations and medication administrations during hospitalization.  Patient will remain independent with ADLs daily.   Patient will sleep 6-8 hours per night.  Patient will eat at least 50% of meals daily.   Outcome: Not Progressing   [facility]. Initially pt was laying in bed and did not respond to writer when told she would be moving rooms. Pt continued to lay in bed with eyes closed. Writer began putting pt's socks on. Pt moved feet to allow writer better access to place socks on feet. Writer then directed pt to swing feet to side of bed, which she did. Pt sat up from bed without issue, but eyes remained closed. Pt stated \"I can't open my eyes.\" Writer and another staff member lead pt to open unit and into new room. Pt immediately lay in bed and thanked staff for assistance with move. Pt ate 50% of dinner when tray was left at bedside without assistance from staff. Pt did not interact with writer following move to open unit room, however was observed to smile in response to conversation. Example: Writer questioned about auditory hallucinations yesterday and pt's report of them telling her she disliked bread. Pt then observed to smile widely and giggle.   2115: Pt out to nurse's station with half eaten egg salad sandwich. Pt handed writer sandwich and requested fer crackers and grape juice. Pt able to make request appropriately and without issue. Reports continued auditory hallucinations, denies suicidal ideation. Pleasant and appropriate during conversation. Bright affect. Eyes open.     Problem: Suicidal Behavior  Goal: Suicidal Behavior is Absent or Managed  Description: Patient will deny SI by discharge.   Patient will remain free from self " harm/injury during hospitalization.   Patient will verbalize 3 coping skills by discharge.   Outcome: Not Progressing       Problem: Psychotic Signs/Symptoms  Goal: Improved Behavioral Control (Psychotic Signs/Symptoms)  Description: Patient will deny hallucinations by discharge.   Patient will remain in control of behaviors towards staff and peers during hospitalization.   Outcome: Not Progressing       Face to face end of shift report to be communicated to oncoming RN.

## 2022-11-29 NOTE — PROGRESS NOTES
"   11/28/22 1844   Patient Belongings   Patient Belongings locker;sent to security per site process   Patient Belongings Put in Hospital Secure Location (Security or Locker, etc.) cash/credit card;cell phone/electronics;keys;purse/wallet;money (see comment)   Belongings Search Yes   Clothing Search Yes   Second Staff Wendi GOMEZ   Comment Random: 2 silver colored rings (1 w/ wave shape & 1 w/ gems), Red USB-C charging cord, black samsung charging block, black ear buds x2, gold colored journal, prayer book w/ sunrise on front, white colored yeal-long prayer book, old spice deoderant, lip makeup pencil, blue hairbrush, comb, new white toothbrush, mini tub of vaseline (mostly gone), essential oil air freshner, lavender essential oil in pink drawstring bag, a bunch of tampons, brown belt, blue cigarette case w/ 1 cigarette, [place] light shorts w/ 11 cigarettes. Clothing: Neon multi-color camoprinted socks, black socks, pink/grey/black socks, black/pink socks, 5 single misc socks, white socks, blue polkadot socks,  tan underwear, pink underwear x4, black polkadot underwear, fuschia polkadot underwear, black bra,coral/navy sports bra, green/black/brown camo sports bra, black tank top, white \"PINK\" sweatshirt, black jogger pants, olive green t-shirt dress, grey button-up long sleeve, cream colored sweater, cream colored cardigan, blue t-shirt, pink/black/grey \"PINK\" leggings w/ cutouts, 2 pairs of jeans, olive green tank top, blue/white/brown striped duffle bag, grey hoodie dress, brown sweashirit, grey band albert, grey tank top, black dress, 2 pairs of plain black leggings, black/gray cardigan, grey/black jogger pants, black/grey shirt, light tan mocassins, grey/black boots w/ fur, black North Face Jacket, black hat w/ furball on top, gold colored sunglasses. 4 [employer] fabric bags.   List items sent to safe: black [employer] smartphone w/ no damage, Daniel key fob w/ 3 other keys and 2 wright chains, Wayne michelle w/ " multiple family pictures, DNR Fishing license, [place] drivers license, 3 1/2 IM care ID cards, Members debit card, Venmo debit card, [place] bank card, [employer] Mastercard, S.S card (not pts.), 2 stamps, random post-it notes, passport, bag of change, 1 $20 bill, 1 $10 bill, 2 $5 bills, 4 $1 bills TOTAL= $44.00, 2 silver colored rings    All other belongings put in assigned cubby in belongings room.       I have reviewed my belongings list on admission and verify that it is correct.     Patient signature_______________________________    Second staff witness (if patient unable to sign) ______________________________       I have received all my belongings at discharge.    Patient signature________________________________    Maria A CONTRERAS   11/28/2022  7:01 PM

## 2022-11-29 NOTE — PLAN OF CARE
Problem: Adult Behavioral Health Plan of Care  Goal: Patient-Specific Goal (Individualization)  Description: Patient will be compliant with treatment team recommendations and medication administrations during hospitalization.  Patient will remain independent with ADLs daily.   Patient will sleep 6-8 hours per night.  Patient will eat at least 50% of meals daily.   11/28/2022-placed in [facility]ICU due to unpredictable behaviors. No wean at this time. Treatment team to reassess daily.   Outcome: Progressing   Goal Outcome Evaluation:       Face to face shift report received from RN. Rounding completed, pt observed.Client rested in room for 7 hours with eyes closed and respirations noted.Face to face report will be communicated to oncoming RN.    Tim Dubose RN  11/29/2022  6:37 AM

## 2022-11-30 LAB
ALBUMIN SERPL BCG-MCNC: 3.8 G/DL (ref 3.5–5.2)
ALBUMIN UR-MCNC: NEGATIVE MG/DL
ALP SERPL-CCNC: 58 U/L (ref 35–104)
ALT SERPL W P-5'-P-CCNC: 11 U/L (ref 10–35)
ANION GAP SERPL CALCULATED.3IONS-SCNC: 12 MMOL/L (ref 7–15)
APPEARANCE UR: CLEAR
AST SERPL W P-5'-P-CCNC: 10 U/L (ref 10–35)
BASOPHILS # BLD AUTO: 0.1 10E3/UL (ref 0–0.2)
BASOPHILS NFR BLD AUTO: 1 %
BILIRUB SERPL-MCNC: <0.2 MG/DL
BILIRUB UR QL STRIP: NEGATIVE
BUN SERPL-MCNC: 9.5 MG/DL (ref 6–20)
CALCIUM SERPL-MCNC: 8.6 MG/DL (ref 8.6–10)
CHLORIDE SERPL-SCNC: 104 MMOL/L (ref 98–107)
COLOR UR AUTO: NORMAL
CREAT SERPL-MCNC: 0.82 MG/DL (ref 0.51–0.95)
DEPRECATED HCO3 PLAS-SCNC: 22 MMOL/L (ref 22–29)
EOSINOPHIL # BLD AUTO: 0 10E3/UL (ref 0–0.7)
EOSINOPHIL NFR BLD AUTO: 0 %
ERYTHROCYTE [DISTWIDTH] IN BLOOD BY AUTOMATED COUNT: 12.4 % (ref 10–15)
ERYTHROCYTE [SEDIMENTATION RATE] IN BLOOD BY WESTERGREN METHOD: 8 MM/HR (ref 0–20)
GFR SERPL CREATININE-BSD FRML MDRD: >90 ML/MIN/1.73M2
GLUCOSE SERPL-MCNC: 151 MG/DL (ref 70–99)
GLUCOSE UR STRIP-MCNC: NEGATIVE MG/DL
HCT VFR BLD AUTO: 39.4 % (ref 35–47)
HGB BLD-MCNC: 13.2 G/DL (ref 11.7–15.7)
HGB UR QL STRIP: NEGATIVE
IMM GRANULOCYTES # BLD: 0 10E3/UL
IMM GRANULOCYTES NFR BLD: 0 %
KETONES UR STRIP-MCNC: NEGATIVE MG/DL
LEUKOCYTE ESTERASE UR QL STRIP: NEGATIVE
LYMPHOCYTES # BLD AUTO: 1.7 10E3/UL (ref 0.8–5.3)
LYMPHOCYTES NFR BLD AUTO: 25 %
MCH RBC QN AUTO: 29.2 PG (ref 26.5–33)
MCHC RBC AUTO-ENTMCNC: 33.5 G/DL (ref 31.5–36.5)
MCV RBC AUTO: 87 FL (ref 78–100)
MONOCYTES # BLD AUTO: 0.5 10E3/UL (ref 0–1.3)
MONOCYTES NFR BLD AUTO: 7 %
NEUTROPHILS # BLD AUTO: 4.6 10E3/UL (ref 1.6–8.3)
NEUTROPHILS NFR BLD AUTO: 67 %
NITRATE UR QL: NEGATIVE
NRBC # BLD AUTO: 0 10E3/UL
NRBC BLD AUTO-RTO: 0 /100
PH UR STRIP: 7 [PH] (ref 4.7–8)
PLATELET # BLD AUTO: 282 10E3/UL (ref 150–450)
POTASSIUM SERPL-SCNC: 3.9 MMOL/L (ref 3.4–5.3)
PROT SERPL-MCNC: 6.2 G/DL (ref 6.4–8.3)
RBC # BLD AUTO: 4.52 10E6/UL (ref 3.8–5.2)
SODIUM SERPL-SCNC: 138 MMOL/L (ref 136–145)
SP GR UR STRIP: 1.02 (ref 1–1.03)
TSH SERPL DL<=0.005 MIU/L-ACNC: 0.7 UIU/ML (ref 0.3–4.2)
UROBILINOGEN UR STRIP-MCNC: NORMAL MG/DL
WBC # BLD AUTO: 6.9 10E3/UL (ref 4–11)

## 2022-11-30 PROCEDURE — 36415 COLL VENOUS BLD VENIPUNCTURE: CPT | Performed by: NURSE PRACTITIONER

## 2022-11-30 PROCEDURE — 82040 ASSAY OF SERUM ALBUMIN: CPT | Performed by: NURSE PRACTITIONER

## 2022-11-30 PROCEDURE — 86255 FLUORESCENT ANTIBODY SCREEN: CPT | Performed by: NURSE PRACTITIONER

## 2022-11-30 PROCEDURE — 82390 ASSAY OF CERULOPLASMIN: CPT | Performed by: NURSE PRACTITIONER

## 2022-11-30 PROCEDURE — 80061 LIPID PANEL: CPT | Performed by: NURSE PRACTITIONER

## 2022-11-30 PROCEDURE — 80053 COMPREHEN METABOLIC PANEL: CPT | Performed by: NURSE PRACTITIONER

## 2022-11-30 PROCEDURE — 99233 SBSQ HOSP IP/OBS HIGH 50: CPT | Performed by: NURSE PRACTITIONER

## 2022-11-30 PROCEDURE — 85652 RBC SED RATE AUTOMATED: CPT | Performed by: NURSE PRACTITIONER

## 2022-11-30 PROCEDURE — 86038 ANTINUCLEAR ANTIBODIES: CPT | Performed by: NURSE PRACTITIONER

## 2022-11-30 PROCEDURE — 86780 TREPONEMA PALLIDUM: CPT | Performed by: NURSE PRACTITIONER

## 2022-11-30 PROCEDURE — 84443 ASSAY THYROID STIM HORMONE: CPT | Performed by: NURSE PRACTITIONER

## 2022-11-30 PROCEDURE — 82607 VITAMIN B-12: CPT | Performed by: NURSE PRACTITIONER

## 2022-11-30 PROCEDURE — 83825 ASSAY OF MERCURY: CPT | Performed by: NURSE PRACTITIONER

## 2022-11-30 PROCEDURE — 85014 HEMATOCRIT: CPT | Performed by: NURSE PRACTITIONER

## 2022-11-30 PROCEDURE — 81003 URINALYSIS AUTO W/O SCOPE: CPT | Performed by: NURSE PRACTITIONER

## 2022-11-30 PROCEDURE — 86618 LYME DISEASE ANTIBODY: CPT | Performed by: NURSE PRACTITIONER

## 2022-11-30 PROCEDURE — 124N000001 HC R&B MH

## 2022-11-30 PROCEDURE — 87389 HIV-1 AG W/HIV-1&-2 AB AG IA: CPT | Performed by: NURSE PRACTITIONER

## 2022-11-30 PROCEDURE — 82306 VITAMIN D 25 HYDROXY: CPT | Performed by: NURSE PRACTITIONER

## 2022-11-30 RX ORDER — LORAZEPAM 1 MG/1
1 TABLET ORAL 2 TIMES DAILY
Status: DISCONTINUED | OUTPATIENT
Start: 2022-11-30 | End: 2022-11-30

## 2022-11-30 RX ORDER — LORAZEPAM 1 MG/1
1 TABLET ORAL 2 TIMES DAILY PRN
Status: DISCONTINUED | OUTPATIENT
Start: 2022-11-30 | End: 2022-11-30

## 2022-11-30 RX ORDER — LORAZEPAM 1 MG/1
2 TABLET ORAL 2 TIMES DAILY
Status: DISCONTINUED | OUTPATIENT
Start: 2022-11-30 | End: 2022-12-02

## 2022-11-30 ASSESSMENT — ACTIVITIES OF DAILY LIVING (ADL)
DRESS: INDEPENDENT;SCRUBS (BEHAVIORAL HEALTH)
ADLS_ACUITY_SCORE: 28
ORAL_HYGIENE: INDEPENDENT
LAUNDRY: UNABLE TO COMPLETE
ADLS_ACUITY_SCORE: 28
ADLS_ACUITY_SCORE: 28
HYGIENE/GROOMING: INDEPENDENT
DRESS: SCRUBS (BEHAVIORAL HEALTH)
ADLS_ACUITY_SCORE: 28
LAUNDRY: UNABLE TO COMPLETE
ADLS_ACUITY_SCORE: 28
ORAL_HYGIENE: INDEPENDENT
HYGIENE/GROOMING: INDEPENDENT
ADLS_ACUITY_SCORE: 28

## 2022-11-30 NOTE — PROGRESS NOTES
"[facility] PSYCHIATRY  PROGRESS NOTE     SUBJECTIVE   This is my first time ever working with Ilana.  She is laying in bed the whole time I meet with her and initially acted as though she did not hear me though I could tell she was holding her eyes shut.  I asked her to open her eyes and she did and she then had a very odd smile on her face and I asked her what she was smiling about and she started laughing as if something very funny had been just said.  She left for quite some time and then did that a few more times during our conversation.  She would stare at me with a very odd confused look and then start laughing and was hardly able to answer any questions.  I asked her if she would be willing to take some Ativan to try to help she does states she is in currently and she shook her head no and started laughing more.  I asked her why she would not be willing to take it and she could not tell me.  I asked her if she wanted to be in the hospital and she shook her head no and then asked her if she wanted to leave and she started laughing hysterically again and could not tell me why she was laughing.  She was not agitated at all though does not appear to understand the majority of what I am saying to her or even make her own needs known.        MEDICATIONS   Scheduled Meds:    LORazepam  2 mg Oral BID     PRN Meds:.acetaminophen, alum & mag hydroxide-simethicone, hydrOXYzine, melatonin, OLANZapine **OR** OLANZapine, senna-docusate     ALLERGIES   Allergies   Allergen Reactions     Penicillins Unknown        MENTAL STATUS EXAM   Vitals: /60 (BP Location: Left arm)   Pulse 78   Temp 97.7  F (36.5  C) (Temporal)   Resp 12   Ht 1.626 m (5' 4\")   LMP  (LMP Unknown)   SpO2 97%   BMI 33.30 kg/m      Appearance: Alert, oriented, dressed in hospital scrubs  Attitude: Confused but also related  Eye Contact: Intermittent  Mood: elated restricted  Affect: Restricted range of affect, mood congruent. Smiles oddly " and laughs inappropriately over nothing.   Speech: nearly mute  Psychomotor Behavior: No tremor, rigidity, akathisia, or psychomotor retardation    Thought Process: disorganized.  Associations: No loose associations   Thought Content: doesn't answer  Insight: Fair   Judgment: Fair  Oriented to: Person, place, and time  Attention Span and Concentration: Intact  Recent and Remote Memory: Intact  Language: English with appropriate syntax and vocabulary  Fund of Knowledge: Average  Muscle Strength and Tone: Grossly normal  Gait and Station: Grossly normal       LABS   No results found for this or any previous visit (from the past 24 hour(s)).      IMPRESSION   This is a 37 year old female with a PMH of major depression and methamphetamine and alcohol use disorder previously admitted on this unit 8/2022 status post suicide attempt currently presenting with concerns for psychosis and catatonia with episodes in which the patient states she can hear, but cannot respond that started on 11/27/22. Patient was started on Clonidine and Risperdal 2 days prior to the onset. Will hold as they may be contributing factors. Patient notes worsening auditory hallucinations the past 2 months. Patient presents mute and uncooperative at times, question if volitional, certainly warrants continued monitoring. Unsure if patient has been abusing substances, she is prescribed Adderall. Given psychosis, will stop. Will further address medications after reassessing tomorrow. If patient does not clear and is agreeable, would reccommend first episode work-up.            DIAGNOSES   1. 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 Primary)  2. Rule out catatonia  3. Rule out cluster B personality traits  4. Major depressive disorder, by history  5. Methamphetamine use disorder, by history  6. Alcohol use disorder, by history            PLAN     Location: Unit 5  Legal Status: Orders Placed This Encounter      Voluntary    Safety  Assessment:    Behavioral Orders   Procedures     Code 1 - Restrict to Unit     Routine Programming     As clinically indicated     Status 15     Every 15 minutes.      PTA medications continued/changed:     -Did not continue Risperdal due to worsened symptoms when initiated while at the [facility]    New medications tried and stopped:     -None    New medications initiated:     Start Ativan 2 mg twice a day: Patient refused  Start Depakote syrup 500 mg nightly: I suspect she will refuse this    Today's Changes:    -Did start ativan 2 mg bid though she refused it.   -will offer prn zyprexa  -will hold off on scheduled antipsychotics due to possibly worsening catatonia when risperdal was started.   Start Depakene syrup 500 mg nightly    Programming: Patient will be treated in a therapeutic milieu with appropriate individual and group therapies. Education will be provided on diagnoses, medications, and treatments.     Medical diagnoses:  Per medicine    Consult: None    Labs: first episode work up ordered    Anticipated LOS: 3-5 days  Disposition: likely home with family       TREATMENT TEAM CARE PLAN     Progress: Continued symptoms.  May need to fill out petition for commitment if continued medication refusal as she cannot care for herself    Continued Stay Criteria/Rationale: Continued symptoms without sufficient improvement/resolution.    Medical/Physical: See above.    Precautions: See above.     Plan: Continue inpatient care with unit support and medication management.    Rationale for change in precautions or plan: NA due to no change.    Participants: Rosemarie Davis NP, Nursing, SW, OT.    The patient's care was discussed with the treatment team and chart notes were reviewed.       ATTESTATION    Rosemarie Davis NP

## 2022-11-30 NOTE — PLAN OF CARE
Problem: Adult Behavioral Health Plan of Care  Goal: Patient-Specific Goal (Individualization)  Description: Patient will be compliant with treatment team recommendations and medication administrations during hospitalization.  Patient will remain independent with ADLs daily.   Patient will sleep 6-8 hours per night.  Patient will eat at least 50% of meals daily.   11/28/2022-placed in MHICU due to unpredictable behaviors. No wean at this time. Treatment team to reassess daily.   Outcome: Progressing   Goal Outcome Evaluation:       Face to face shift report received from RN. Rounding completed, pt observed. Client rested in room for 5.5 hours with eyes closed and respirations noted.Face to face report will be communicated to oncoming RN.    Tim Dubose RN  11/30/2022  6:36 AM

## 2022-11-30 NOTE — PLAN OF CARE
Problem: Adult Behavioral Health Plan of Care  Goal: Patient-Specific Goal (Individualization)  Description: Patient will be compliant with treatment team recommendations and medication administrations during hospitalization.  Patient will remain independent with ADLs daily.   Patient will sleep 6-8 hours per night.  Patient will eat at least 50% of meals daily.   Outcome: Not Progressing  Note: Report received from Tim HARTMAN.  Rounding complete.  Patient is in bed and asleep at start of shift.    Patient ate 0% of breakfast and 100% of lunch.  Patient had been isolative to her room throughout the shift.  She appears preoccupied.  At times she will not verbally respond when asked questions.  Provider scheduled lorazepam 2 mg BID.  Patient refused to take it and repeatedly shook her head side to side.  When asked why she did not want to take it, she continued to shake her head.  During patient's last admission to this unit, she utilized PRN olanzapine which was effect for her.  Offered PRN olanzapine.  Patient does know this medication is available.     Problem: Suicidal Behavior  Goal: Suicidal Behavior is Absent or Managed  Description: Patient will deny SI by discharge.   Patient will remain free from self harm/injury during hospitalization.   Patient will verbalize 3 coping skills by discharge.   Outcome: Progressing  Note: Patient had no noted self harm this shift.      Problem: Psychotic Signs/Symptoms  Goal: Improved Behavioral Control (Psychotic Signs/Symptoms)  Description: Patient will deny hallucinations by discharge.   Patient will remain in control of behaviors towards staff and peers during hospitalization.   Outcome: Not Progressing  Note: Patient appears preoccupied.  Patient had no noted behavioral outbursts this shift.         Face to face end of shift report communicated to evening shift staff.     Meli CONTRERAS RN  11/30/2022

## 2022-12-01 LAB
DEPRECATED CALCIDIOL+CALCIFEROL SERPL-MC: 23 UG/L (ref 20–75)
HIV 1+2 AB+HIV1 P24 AG SERPL QL IA: NONREACTIVE
T PALLIDUM AB SER QL: NONREACTIVE

## 2022-12-01 PROCEDURE — 124N000001 HC R&B MH

## 2022-12-01 PROCEDURE — 99233 SBSQ HOSP IP/OBS HIGH 50: CPT | Performed by: NURSE PRACTITIONER

## 2022-12-01 ASSESSMENT — ACTIVITIES OF DAILY LIVING (ADL)
ADLS_ACUITY_SCORE: 28
DRESS: INDEPENDENT;SCRUBS (BEHAVIORAL HEALTH)
ORAL_HYGIENE: INDEPENDENT
ADLS_ACUITY_SCORE: 28
ORAL_HYGIENE: INDEPENDENT
ADLS_ACUITY_SCORE: 28
DRESS: SCRUBS (BEHAVIORAL HEALTH)
ADLS_ACUITY_SCORE: 28
ADLS_ACUITY_SCORE: 28
HYGIENE/GROOMING: INDEPENDENT
ADLS_ACUITY_SCORE: 28
HYGIENE/GROOMING: INDEPENDENT
ADLS_ACUITY_SCORE: 28
ADLS_ACUITY_SCORE: 28
LAUNDRY: UNABLE TO COMPLETE
LAUNDRY: UNABLE TO COMPLETE
ADLS_ACUITY_SCORE: 28
ADLS_ACUITY_SCORE: 28

## 2022-12-01 NOTE — PLAN OF CARE
Problem: Adult Behavioral Health Plan of Care  Goal: Patient-Specific Goal (Individualization)  Description: Patient will be compliant with treatment team recommendations and medication administrations during hospitalization.  Patient will remain independent with ADLs daily.   Patient will sleep 6-8 hours per night.  Patient will eat at least 50% of meals daily.     Patient is isolative and withdrawn, spending time in her room in bed. Affect is flat, mood is calm. She laughs incongruently and appears to be responding to internal stimuli. She denies SI, HI, anxiety, depression, hallucinations, and pain. States she is fine. She refused her bedtime medications, and asks why she needs to take Depakene. Has been appropriate with staff and peers.   Face to face end of shift report communicated to oncoming RN.     Lola Jane RN  11/30/2022  10:46 PM    Outcome: Progressing     Problem: Suicidal Behavior  Goal: Suicidal Behavior is Absent or Managed  Description: Patient will deny SI by discharge.   Patient will remain free from self harm/injury during hospitalization.   Patient will verbalize 3 coping skills by discharge.     Patient denies SI, has remained free from self harm/injury.   Outcome: Progressing     Problem: Psychotic Signs/Symptoms  Goal: Improved Behavioral Control (Psychotic Signs/Symptoms)  Description: Patient will deny hallucinations by discharge.   Patient will remain in control of behaviors towards staff and peers during hospitalization.     Patient denies hallucinations.  Outcome: Progressing   Goal Outcome Evaluation:    Plan of Care Reviewed With: patient

## 2022-12-01 NOTE — CARE PLAN
Attempted to review home medications with patient again.     Patient confirmed  and pharmacy and then stopped responding.     Yuni Berger on 2022 at 10:37 AM

## 2022-12-01 NOTE — PLAN OF CARE
Problem: Adult Behavioral Health Plan of Care  Goal: Patient-Specific Goal (Individualization)  Description: Patient will be compliant with treatment team recommendations and medication administrations during hospitalization.  Patient will remain independent with ADLs daily.   Patient will sleep 6-8 hours per night.  Patient will eat at least 50% of meals daily.   Outcome: Not Progressing  Note: Report received from Tim HARTMAN.  Rounding complete.  Patient is in bed at start of shift.    Patient refused to take scheduled lorazepam this morning.  She just shook her head side to side. Offered PRN olanzapine.  Patient declined by shaking her head side to side.  Her affect is incongruent with mood.  She c/o nausea today and requested saltine crackers and ginger ale.  These were given to her.  Her responses are slightly delayed.  She is isolative to her room, spending most of the shift in bed and awake.       Problem: Suicidal Behavior  Goal: Suicidal Behavior is Absent or Managed  Description: Patient will deny SI by discharge.   Patient will remain free from self harm/injury during hospitalization.   Patient will verbalize 3 coping skills by discharge.   Outcome: Progressing  Note: Patient had no noted self harm this shift.      Problem: Psychotic Signs/Symptoms  Goal: Improved Behavioral Control (Psychotic Signs/Symptoms)  Description: Patient will deny hallucinations by discharge.   Patient will remain in control of behaviors towards staff and peers during hospitalization.   Outcome: Not Progressing   Goal Outcome Evaluation:

## 2022-12-01 NOTE — PLAN OF CARE
"  Problem: Adult Behavioral Health Plan of Care  Goal: Patient-Specific Goal (Individualization)  Description: Patient will be compliant with treatment team recommendations and medication administrations during hospitalization.  Patient will remain independent with ADLs daily.   Patient will sleep 6-8 hours per night.  Patient will eat at least 50% of meals daily.     Patient is isolative and withdrawn, spending time in her room sitting in bed reading a book. Affect is flat, mood is calm. She smiles with her mouth shut and shakes her head \"no\" to assessment questions. States she will not take her medications at bedtime. Has been appropriate with staff and peers.   Face to face end of shift report communicated to oncoming RN.     Lola Jane RN  12/1/2022  9:54 PM    Outcome: Not Progressing     Problem: Psychotic Signs/Symptoms  Goal: Improved Behavioral Control (Psychotic Signs/Symptoms)  Description: Patient will deny hallucinations by discharge.   Patient will remain in control of behaviors towards staff and peers during hospitalization.     Patient denies hallucinations, but does appear to be responding to internal stimuli.   Outcome: Not Progressing     Problem: Suicidal Behavior  Goal: Suicidal Behavior is Absent or Managed  Description: Patient will deny SI by discharge.   Patient will remain free from self harm/injury during hospitalization.   Patient will verbalize 3 coping skills by discharge.     Patient denies SI, has remained free from self harm/injury.   Outcome: Progressing   Goal Outcome Evaluation:    Plan of Care Reviewed With: patient                   "

## 2022-12-01 NOTE — PROGRESS NOTES
"[facility] PSYCHIATRY  PROGRESS NOTE     SUBJECTIVE   To family is still hardly able to stay more than a couple of words to me.  She still occasionally laughs for no reason while she is staring at me and I am asking her questions.  She does not answer any of my questions appropriately though at times will answer yes and no.  I asked her if she was feeling good right now or if she was feeling confused and afraid and she was able to state she was confused and then began to sob.  I asked her if she wanted to be here on the psychiatric unit and she shook her head yes.  I asked her if she wanted to start feeling better and she shook her head yes.  I discussed medication options with her and discussed starting Ativan to target catatonia as she is hardly able to speak and communicate.  I asked her if she was willing to take any Ativan or any other medication and she shook her head no.  She would not tell me why she is not willing to take medications though did tell me if she did want to start feeling better.  She has been eating fair amounts and drinking.  She does not engage with other patients much at all and appears very preoccupied       MEDICATIONS   Scheduled Meds:    LORazepam  2 mg Oral BID     valproic acid  500 mg Oral At Bedtime     PRN Meds:.acetaminophen, alum & mag hydroxide-simethicone, hydrOXYzine, melatonin, OLANZapine **OR** OLANZapine, senna-docusate     ALLERGIES   Allergies   Allergen Reactions     Penicillins Unknown        MENTAL STATUS EXAM   Vitals: /63   Pulse 66   Temp 98.6  F (37  C) (Temporal)   Resp 16   Ht 1.626 m (5' 4\")   LMP  (LMP Unknown)   SpO2 98%   BMI 33.30 kg/m      Appearance: Alert, oriented, dressed in hospital scrubs  Attitude: Confused but also related  Eye Contact: Intermittent  Mood: elated restricted  Affect: Restricted range of affect, mood congruent. Smiles oddly and laughs inappropriately over nothing.   Speech: nearly mute  Psychomotor Behavior: No " tremor, rigidity, akathisia, or psychomotor retardation    Thought Process: disorganized.  Associations: No loose associations   Thought Content: doesn't answer  Insight: Fair   Judgment: Fair  Oriented to: Person, place, and time  Attention Span and Concentration: Intact  Recent and Remote Memory: Intact  Language: English with appropriate syntax and vocabulary  Fund of Knowledge: Average  Muscle Strength and Tone: Grossly normal  Gait and Station: Grossly normal       LABS   Recent Results (from the past 24 hour(s))   UA reflex to Microscopic and Culture    Collection Time: 11/30/22  8:49 PM    Specimen: Urine, Clean Catch   Result Value Ref Range    Color Urine Light Yellow Colorless, Straw, Light Yellow, Yellow    Appearance Urine Clear Clear    Glucose Urine Negative Negative mg/dL    Bilirubin Urine Negative Negative    Ketones Urine Negative Negative mg/dL    Specific Gravity Urine 1.022 1.003 - 1.035    Blood Urine Negative Negative    pH Urine 7.0 4.7 - 8.0    Protein Albumin Urine Negative Negative mg/dL    Urobilinogen Urine Normal Normal, 2.0 mg/dL    Nitrite Urine Negative Negative    Leukocyte Esterase Urine Negative Negative         IMPRESSION   This is a 37 year old female with a PMH of major depression and methamphetamine and alcohol use disorder previously admitted on this unit 8/2022 status post suicide attempt currently presenting with concerns for psychosis and catatonia with episodes in which the patient states she can hear, but cannot respond that started on 11/27/22. Patient was started on Clonidine and Risperdal 2 days prior to the onset. Will hold as they may be contributing factors. Patient notes worsening auditory hallucinations the past 2 months. Patient presents mute and uncooperative at times, question if volitional, certainly warrants continued monitoring. Unsure if patient has been abusing substances, she is prescribed Adderall. Given psychosis, will stop. Will further address  medications after reassessing tomorrow. If patient does not clear and is agreeable, would reccommend first episode work-up.            DIAGNOSES   1. 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 Primary)  2. Rule out catatonia  3. Rule out cluster B personality traits  4. Major depressive disorder, by history  5. Methamphetamine use disorder, by history  6. Alcohol use disorder, by history            PLAN     Location: Unit 5  Legal Status: Orders Placed This Encounter      Voluntary    Safety Assessment:    Behavioral Orders   Procedures     Code 1 - Restrict to Unit     Routine Programming     As clinically indicated     Status 15     Every 15 minutes.      PTA medications continued/changed:     -Did not continue Risperdal due to worsened symptoms when initiated while at the [facility]    New medications tried and stopped:     -None    New medications initiated:     Start Ativan 2 mg twice a day: Patient refused  Start Depakote syrup 500 mg nightly: I suspect she will refuse this    Today's Changes:    Continue to offer ativan 2 mg bid though she refused it.   -will offer prn zyprexa  -will hold off on scheduled antipsychotics due to possibly worsening catatonia when risperdal was started.   Start Depakene syrup 500 mg nightly    Programming: Patient will be treated in a therapeutic milieu with appropriate individual and group therapies. Education will be provided on diagnoses, medications, and treatments.     Medical diagnoses:  Per medicine    Consult: None    Labs: first episode work up ordered    Anticipated LOS: 3-5 days  Disposition: likely home with family       TREATMENT TEAM CARE PLAN     Progress: Continued symptoms.  Petition filled out.     Continued Stay Criteria/Rationale: Continued symptoms without sufficient improvement/resolution.    Medical/Physical: See above.    Precautions: See above.     Plan: Continue inpatient care with unit support and medication  management.    Rationale for change in precautions or plan: NA due to no change.    Participants: Rosemarie Davis NP, Nursing, SW, OT.    The patient's care was discussed with the treatment team and chart notes were reviewed.       ATTESTATION    Rosemarie Davis NP

## 2022-12-01 NOTE — PLAN OF CARE
Problem: Adult Behavioral Health Plan of Care  Goal: Patient-Specific Goal (Individualization)  Description: Patient will be compliant with treatment team recommendations and medication administrations during hospitalization.  Patient will remain independent with ADLs daily.   Patient will sleep 6-8 hours per night.  Patient will eat at least 50% of meals daily.   Outcome: Progressing   Goal Outcome Evaluation:       Face to face shift report received from RN. Rounding completed, pt observed. Client rested in room for 7 hours with eyes closed and respirations noted.Face to face report will be communicated to oncoming RN.    Tim Dubose RN  12/1/2022  6:29 AM

## 2022-12-02 LAB
ANA PAT SER IF-IMP: ABNORMAL
ANA SER QL IF: POSITIVE
ANA TITR SER IF: ABNORMAL {TITER}
B BURGDOR IGG+IGM SER QL: 0.53
CHOLEST SERPL-MCNC: 152 MG/DL
HDLC SERPL-MCNC: 35 MG/DL
LDLC SERPL CALC-MCNC: 93 MG/DL
NONHDLC SERPL-MCNC: 117 MG/DL
TRIGL SERPL-MCNC: 119 MG/DL
VIT B12 SERPL-MCNC: 254 PG/ML (ref 232–1245)

## 2022-12-02 PROCEDURE — 99233 SBSQ HOSP IP/OBS HIGH 50: CPT | Performed by: NURSE PRACTITIONER

## 2022-12-02 PROCEDURE — 124N000001 HC R&B MH

## 2022-12-02 PROCEDURE — 250N000013 HC RX MED GY IP 250 OP 250 PS 637: Performed by: NURSE PRACTITIONER

## 2022-12-02 RX ORDER — LITHIUM CARBONATE 300 MG/1
300 TABLET, FILM COATED, EXTENDED RELEASE ORAL AT BEDTIME
Status: DISCONTINUED | OUTPATIENT
Start: 2022-12-02 | End: 2022-12-03

## 2022-12-02 RX ORDER — LURASIDONE HYDROCHLORIDE 40 MG/1
40 TABLET, FILM COATED ORAL
Status: DISCONTINUED | OUTPATIENT
Start: 2022-12-02 | End: 2022-12-03

## 2022-12-02 RX ORDER — LORAZEPAM 1 MG/1
1 TABLET ORAL ONCE
Status: COMPLETED | OUTPATIENT
Start: 2022-12-02 | End: 2022-12-02

## 2022-12-02 RX ORDER — LORAZEPAM 1 MG/1
2 TABLET ORAL 3 TIMES DAILY
Status: DISCONTINUED | OUTPATIENT
Start: 2022-12-02 | End: 2022-12-03

## 2022-12-02 RX ADMIN — LITHIUM CARBONATE 300 MG: 300 TABLET, EXTENDED RELEASE ORAL at 20:02

## 2022-12-02 RX ADMIN — LORAZEPAM 1 MG: 1 TABLET ORAL at 09:31

## 2022-12-02 RX ADMIN — LORAZEPAM 2 MG: 1 TABLET ORAL at 12:43

## 2022-12-02 RX ADMIN — LORAZEPAM 2 MG: 1 TABLET ORAL at 20:03

## 2022-12-02 RX ADMIN — LURASIDONE HYDROCHLORIDE 40 MG: 40 TABLET, FILM COATED ORAL at 17:22

## 2022-12-02 ASSESSMENT — ACTIVITIES OF DAILY LIVING (ADL)
ADLS_ACUITY_SCORE: 28
ORAL_HYGIENE: INDEPENDENT
ORAL_HYGIENE: INDEPENDENT
ADLS_ACUITY_SCORE: 28
DRESS: SCRUBS (BEHAVIORAL HEALTH)
ADLS_ACUITY_SCORE: 28
HYGIENE/GROOMING: INDEPENDENT
ADLS_ACUITY_SCORE: 28
HYGIENE/GROOMING: INDEPENDENT
DRESS: SCRUBS (BEHAVIORAL HEALTH)
ADLS_ACUITY_SCORE: 28
ADLS_ACUITY_SCORE: 28
LAUNDRY: UNABLE TO COMPLETE
ADLS_ACUITY_SCORE: 28

## 2022-12-02 NOTE — PLAN OF CARE
Problem: Adult Behavioral Health Plan of Care  Goal: Patient-Specific Goal (Individualization)  Description: Patient will be compliant with treatment team recommendations and medication administrations during hospitalization.  Patient will remain independent with ADLs daily.   Patient will sleep 6-8 hours per night.  Patient will eat at least 50% of meals daily.   Outcome: Progressing   Goal Outcome Evaluation:       Face to face shift report received from RN. Rounding completed, pt observed. Client rested 7 hours with eyes closed and respirations noted.Face to face report will be communicated to oncoming RN.    Tim Dubose RN  12/2/2022  6:40 AM

## 2022-12-02 NOTE — PROGRESS NOTES
"[facility] PSYCHIATRY  PROGRESS NOTE     SUBJECTIVE   This morning Ilana was talking on a slight bit better than she was yesterday.  She did tell me she felt a little bit better than yesterday and had not taken any medications though she was still very flat and tearful at times.  She was able to tell me that she has been experiencing auditory hallucinations for approximately 1 year.  She states that they sometimes say very funny things though sometimes they say very mean things.  They never tell her to harm herself.  She states she has been very depressed for the past year especially the past couple of months and states she has not had an easy time getting out of her bedroom.  She sobs multiple times during our conversation and tells me \"my head is so screwed up I do not even know how to get my life back\".  She cannot identify 1 single stressor in her life that is making her life out of control and does believe her mental health symptoms are what is the problem.  She states she has been sober from methamphetamine for over 50 days and states she could see how the Adderall had been contributing to worsened psychosis.  She there was no suspicion of her abusing the Adderall as she came in with more than the amount she should have       MEDICATIONS   Scheduled Meds:    LORazepam  2 mg Oral BID     valproic acid  500 mg Oral At Bedtime     PRN Meds:.acetaminophen, alum & mag hydroxide-simethicone, hydrOXYzine, melatonin, OLANZapine **OR** OLANZapine, senna-docusate     ALLERGIES   Allergies   Allergen Reactions     Penicillins Unknown        MENTAL STATUS EXAM   Vitals: /62 (BP Location: Left arm)   Pulse 68   Temp 99.3  F (37.4  C) (Temporal)   Resp 14   Ht 1.626 m (5' 4\")   LMP  (LMP Unknown)   SpO2 96%   BMI 33.30 kg/m      Appearance: Alert, oriented, dressed in hospital scrubs  Attitude: Confused but also related  Eye Contact: Intermittent  Mood: elated restricted  Affect: Restricted range of " affect, mood congruent. Smiles oddly and laughs inappropriately over nothing.   Speech: nearly mute  Psychomotor Behavior: No tremor, rigidity, akathisia, or psychomotor retardation    Thought Process: disorganized.  Associations: No loose associations   Thought Content: doesn't answer  Insight: Fair   Judgment: Fair  Oriented to: Person, place, and time  Attention Span and Concentration: Intact  Recent and Remote Memory: Intact  Language: English with appropriate syntax and vocabulary  Fund of Knowledge: Average  Muscle Strength and Tone: Grossly normal  Gait and Station: Grossly normal       LABS   No results found for this or any previous visit (from the past 24 hour(s)).      IMPRESSION   This is a 37 year old female with a PMH of major depression and methamphetamine and alcohol use disorder previously admitted on this unit 8/2022 status post suicide attempt currently presenting with concerns for psychosis and catatonia with episodes in which the patient states she can hear, but cannot respond that started on 11/27/22. Patient was started on Clonidine and Risperdal 2 days prior to the onset. Will hold as they may be contributing factors. Patient notes worsening auditory hallucinations the past 2 months. Patient presents mute and uncooperative at times, question if volitional, certainly warrants continued monitoring. Unsure if patient has been abusing substances, she is prescribed Adderall. Given psychosis, will stop. Will further address medications after reassessing tomorrow. If patient does not clear and is agreeable, would reccommend first episode work-up.            DIAGNOSES   1. 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 Bipolar type 1 most recent mixed vs depressed with catatonia  3.amphetamine use disorder, moderate            PLAN     Location: Unit 5  Legal Status: Orders Placed This Encounter      Emergency Hospitalization Hold (72 Hr Hold)    Safety  Assessment:    Behavioral Orders   Procedures     Code 1 - Restrict to Unit     Routine Programming     As clinically indicated     Status 15     Every 15 minutes.      PTA medications continued/changed:     -Did not continue Risperdal due to worsened symptoms when initiated while at the [facility]    New medications tried and stopped:     -None    New medications initiated:     Start Ativan 2 mg twice a day: Patient refused  Start Depakote syrup 500 mg nightly: I suspect she will refuse this    Today's Changes:        Petition sent to [facility] agree start Ativan today and responded well to it  Start Latuda 40 mg   start lithium 300 mg and titrate        Programming: Patient will be treated in a therapeutic milieu with appropriate individual and group therapies. Education will be provided on diagnoses, medications, and treatments.     Medical diagnoses:  Per medicine    Consult: None    Labs: first episode work up ordered    Anticipated LOS: 3-5 days  Disposition: likely home with family       TREATMENT TEAM CARE PLAN     Progress: Continued symptoms.  Petition filled out.     Continued Stay Criteria/Rationale: Continued symptoms without sufficient improvement/resolution.    Medical/Physical: See above.    Precautions: See above.     Plan: Continue inpatient care with unit support and medication management.    Rationale for change in precautions or plan: NA due to no change.    Participants: Rosemarie Davis NP, Nursing, SW, OT.    The patient's care was discussed with the treatment team and chart notes were reviewed.       ATTESTATION    Rosemarie Davis NP

## 2022-12-02 NOTE — PLAN OF CARE
Problem: Adult Behavioral Health Plan of Care  Goal: Patient-Specific Goal (Individualization)  Description: Patient will be compliant with treatment team recommendations and medication administrations during hospitalization.  Patient will remain independent with ADLs daily.   Patient will sleep 6-8 hours per night.  Patient will eat at least 50% of meals daily.   Outcome: Progressing     Problem: Suicidal Behavior  Goal: Suicidal Behavior is Absent or Managed  Description: Patient will deny SI by discharge.   Patient will remain free from self harm/injury during hospitalization.   Patient will verbalize 3 coping skills by discharge.   Outcome: Progressing     Problem: Psychotic Signs/Symptoms  Goal: Improved Behavioral Control (Psychotic Signs/Symptoms)  Description: Patient will deny hallucinations by discharge.   Patient will remain in control of behaviors towards staff and peers during hospitalization.   Outcome: Progressing   Goal Outcome Evaluation:         Pt. Has been in room so far this shift, little verbal feedback when asking questions, nods or shakes head as a yes or no, denies pain, suicidal ideation and hallucinations, slept 7 hours last night, eating at least 50% of meals, is independent with ADL's, did take the prescribed ativan this morning, will continue to monitor progress.    Face to face end of shift report will be communicated to oncoming afternoon shift RN.     Shilpa Boogie RN  12/2/2022  12:22 PM

## 2022-12-02 NOTE — PLAN OF CARE
Called and left message with Bernardino at [facility] regarding petition for commitment.       Other SW emailed and confirmed they received the petition for commitment.

## 2022-12-03 LAB
ARSENIC BLD-MCNC: <10 UG/L
LEAD BLDV-MCNC: <2 UG/DL
MERCURY BLD-MCNC: <2.5 UG/L
NMDAR IGG TITR SER IF: NORMAL {TITER}

## 2022-12-03 PROCEDURE — 124N000001 HC R&B MH

## 2022-12-03 PROCEDURE — 250N000013 HC RX MED GY IP 250 OP 250 PS 637: Performed by: NURSE PRACTITIONER

## 2022-12-03 PROCEDURE — 99233 SBSQ HOSP IP/OBS HIGH 50: CPT | Performed by: NURSE PRACTITIONER

## 2022-12-03 RX ORDER — LITHIUM CARBONATE 450 MG
450 TABLET, EXTENDED RELEASE ORAL AT BEDTIME
Status: DISCONTINUED | OUTPATIENT
Start: 2022-12-03 | End: 2022-12-04

## 2022-12-03 RX ADMIN — LURASIDONE HYDROCHLORIDE 60 MG: 40 TABLET, FILM COATED ORAL at 17:22

## 2022-12-03 RX ADMIN — HYDROXYZINE HYDROCHLORIDE 25 MG: 25 TABLET ORAL at 03:11

## 2022-12-03 RX ADMIN — LORAZEPAM 2 MG: 1 TABLET ORAL at 12:35

## 2022-12-03 RX ADMIN — LORAZEPAM 1.5 MG: 1 TABLET ORAL at 21:22

## 2022-12-03 RX ADMIN — ACETAMINOPHEN 650 MG: 325 TABLET, FILM COATED ORAL at 10:10

## 2022-12-03 RX ADMIN — LORAZEPAM 2 MG: 1 TABLET ORAL at 06:51

## 2022-12-03 RX ADMIN — LITHIUM CARBONATE 450 MG: 450 TABLET, EXTENDED RELEASE ORAL at 21:23

## 2022-12-03 ASSESSMENT — ACTIVITIES OF DAILY LIVING (ADL)
ADLS_ACUITY_SCORE: 28
HYGIENE/GROOMING: INDEPENDENT
ADLS_ACUITY_SCORE: 28
ORAL_HYGIENE: INDEPENDENT
ADLS_ACUITY_SCORE: 28
DRESS: SCRUBS (BEHAVIORAL HEALTH)
LAUNDRY: UNABLE TO COMPLETE

## 2022-12-03 NOTE — PROGRESS NOTES
"[facility] PSYCHIATRY  PROGRESS NOTE     SUBJECTIVE   Ilana was very tearful this morning.  She was able to hold very good conversation though expressed how extremely depressed she has been feeling leading to this hospitalization.  She talks about how she got  last year to a man who traveled for work and moved to [place] for 6 months with him and her daughters wanted to stay in [place] with their father and she states she believes the depression became very significant at that time and has been increasing since.  I did speak with Ilana's stepfather at length last night.  He states that Ilana's mother was diagnosed with schizophrenia and bipolar disorder and  at the age of 40 due to complications from alcoholism.  He states that Ilana's mother also did hear voices and had very significant periods of depression similar to [place] where she at times cannot get out of bed.  Ilana and I talked about this and initially she appeared to feel more hopeless though the fact that she has good insight a strong support system.      MEDICATIONS   Scheduled Meds:    lithium ER  450 mg Oral At Bedtime     LORazepam  1.5 mg Oral TID     lurasidone  60 mg Oral Daily with supper     PRN Meds:.acetaminophen, alum & mag hydroxide-simethicone, hydrOXYzine, melatonin, OLANZapine **OR** OLANZapine, senna-docusate     ALLERGIES   Allergies   Allergen Reactions     Penicillins Unknown        MENTAL STATUS EXAM   Vitals: /58 (BP Location: Right arm)   Pulse 67   Temp 97.8  F (36.6  C) (Temporal)   Resp 14   Ht 1.626 m (5' 4\")   LMP  (LMP Unknown)   SpO2 97%   BMI 33.30 kg/m      Appearance: Alert, oriented, dressed in hospital scrubs  Attitude: Confused but also related  Eye Contact: Intermittent  Mood: elated restricted  Affect: Restricted range of affect, mood congruent. Smiles oddly and laughs inappropriately over nothing.   Speech: nearly mute  Psychomotor Behavior: No tremor, rigidity, akathisia, " or psychomotor retardation    Thought Process: disorganized.  Associations: No loose associations   Thought Content: doesn't answer  Insight: Fair   Judgment: Fair  Oriented to: Person, place, and time  Attention Span and Concentration: Intact  Recent and Remote Memory: Intact  Language: English with appropriate syntax and vocabulary  Fund of Knowledge: Average  Muscle Strength and Tone: Grossly normal  Gait and Station: Grossly normal       LABS   No results found for this or any previous visit (from the past 24 hour(s)).      IMPRESSION   This is a 37 year old female with a PMH of major depression and methamphetamine and alcohol use disorder previously admitted on this unit 8/2022 status post suicide attempt currently presenting with concerns for psychosis and catatonia with episodes in which the patient states she can hear, but cannot respond that started on 11/27/22. Patient was started on Clonidine and Risperdal 2 days prior to the onset. Will hold as they may be contributing factors. Patient notes worsening auditory hallucinations the past 2 months. Patient presents mute and uncooperative at times, question if volitional, certainly warrants continued monitoring. Unsure if patient has been abusing substances, she is prescribed Adderall. Given psychosis, will stop. Will further address medications after reassessing tomorrow. If patient does not clear and is agreeable, would reccommend first episode work-up.            DIAGNOSES   1. 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 Bipolar type 1 most recent mixed vs depressed with catatonia  3.amphetamine use disorder, moderate            PLAN     Location: Unit 5  Legal Status: Orders Placed This Encounter      Emergency Hospitalization Hold (72 Hr Hold)    Safety Assessment:    Behavioral Orders   Procedures     Code 1 - Restrict to Unit     Routine Programming     As clinically indicated     Status 15     Every 15  minutes.      PTA medications continued/changed:     -Did not continue Risperdal due to worsened symptoms when initiated while at the [facility]    New medications tried and stopped:     -None    New medications initiated:     Start Ativan 2 mg twice a day: Patient refused  Start Depakote syrup 500 mg nightly: I suspect she will refuse this    Today's Changes:    Increase lithium to 450  decrease ativan to 1.5 tid  Increase latuda 60 mg         Programming: Patient will be treated in a therapeutic milieu with appropriate individual and group therapies. Education will be provided on diagnoses, medications, and treatments.     Medical diagnoses:  Per medicine    Consult: None    Labs: first episode work up ordered    Anticipated LOS: 3-5 days  Disposition: likely home with family       TREATMENT TEAM CARE PLAN     Progress: Continued symptoms.  Petition filled out.     Continued Stay Criteria/Rationale: Continued symptoms without sufficient improvement/resolution.    Medical/Physical: See above.    Precautions: See above.     Plan: Continue inpatient care with unit support and medication management.    Rationale for change in precautions or plan: NA due to no change.    Participants: Rosemarie Davis NP, Nursing, SW, OT.    The patient's care was discussed with the treatment team and chart notes were reviewed.       ATTESTATION    Rosemarie Davis NP

## 2022-12-03 NOTE — PLAN OF CARE
Problem: Adult Behavioral Health Plan of Care  Goal: Patient-Specific Goal (Individualization)  Description: Patient will be compliant with treatment team recommendations and medication administrations during hospitalization.  Patient will remain independent with ADLs daily.   Patient will sleep 6-8 hours per night.  Patient will eat at least 50% of meals daily.   Outcome: Progressing    Face to face end of shift report received from evening shift RN. Rounding completed. Pt observed in their own bed, with eyes closed, in supine position, and with rested breathing. 0300 Pt up to request to speak with this writer. Pt reports that her right foot is burning. In-between toes on right foot assessed to have peeling skin; Pt reports that they have a history of athlete's foot. 0311 Hydroxyzine administered prn as ordered for anxiety. Pt returns to bed. Pt accepts and receives this shift's scheduled medication. Will continue to support the needs of the patient. Face to face end of shift report to be given to day shift RN.     Pt slept approximately 6.5 hours.      Problem: Suicidal Behavior  Goal: Suicidal Behavior is Absent or Managed  Description: Patient will deny SI by discharge.   Patient will remain free from self harm/injury during hospitalization.   Patient will verbalize 3 coping skills by discharge.   Outcome: Progressing -- Pt remained free of self injury and harm throughout the duration of this shift.       Problem: Psychotic Signs/Symptoms  Goal: Improved Behavioral Control (Psychotic Signs/Symptoms)  Description: Patient will deny hallucinations by discharge.   Patient will remain in control of behaviors towards staff and peers during hospitalization.   Outcome: Progressing

## 2022-12-03 NOTE — PLAN OF CARE
"  Problem: Adult Behavioral Health Plan of Care  Goal: Patient-Specific Goal (Individualization)  Description: Patient will be compliant with treatment team recommendations and medication administrations during hospitalization.  Patient will remain independent with ADLs daily.   Patient will sleep 6-8 hours per night.  Patient will eat at least 50% of meals daily.   Outcome: Progressing     Problem: Suicidal Behavior  Goal: Suicidal Behavior is Absent or Managed  Description: Patient will deny SI by discharge.   Patient will remain free from self harm/injury during hospitalization.   Patient will verbalize 3 coping skills by discharge.   Outcome: Progressing     Problem: Psychotic Signs/Symptoms  Goal: Improved Behavioral Control (Psychotic Signs/Symptoms)  Description: Patient will deny hallucinations by discharge.   Patient will remain in control of behaviors towards staff and peers during hospitalization.   Outcome: Progressing   Goal Outcome Evaluation:       Pt. Is awake, ate breakfast in room, appetite is good, eating at least 50%, slept 6.5 hours last night, is independent with ADL's, denies suicidal ideation, pain and hallucinations, is able to make needs be known, answering questions without difficulty, is alert and oriented, Pt. Stated \"I'm going to try to go to groups today\", encouragement given, will continue to monitor progress.  1010-  Pt. Requested/received tylenol 650 mg po for headache, rated at a 3 of 10.      Face to face end of shift report will be communicated to oncoming afternoon shift RN.     Shilpa Boogie RN  12/3/2022  8:58 AM                       "

## 2022-12-03 NOTE — PLAN OF CARE
Problem: Adult Behavioral Health Plan of Care  Goal: Patient-Specific Goal (Individualization)  Description: Patient will be compliant with treatment team recommendations and medication administrations during hospitalization.  Patient will remain independent with ADLs daily.   Patient will sleep 6-8 hours per night.  Patient will eat at least 50% of meals daily.   Outcome: Progressing     Problem: Suicidal Behavior  Goal: Suicidal Behavior is Absent or Managed  Description: Patient will deny SI by discharge.   Patient will remain free from self harm/injury during hospitalization.   Patient will verbalize 3 coping skills by discharge.   Outcome: Progressing     Problem: Psychotic Signs/Symptoms  Goal: Improved Behavioral Control (Psychotic Signs/Symptoms)  Description: Patient will deny hallucinations by discharge.   Patient will remain in control of behaviors towards staff and peers during hospitalization.   Outcome: Progressing     Pt denies SI/HI, AH/VH, pain, depression. Pt endorses some anxiety. Pt is medication compliant and VSS. Pt ate 100% of dinner in lounge. Pt has a flat affect. Pt is calm and alert. Pt is using appropriate behavior with staff and peers.  Pt in room most of shift.     Face to face report will be communicated to oncoming RN.    Tayler Gusman RN  12/2/2022

## 2022-12-04 PROCEDURE — 124N000001 HC R&B MH

## 2022-12-04 PROCEDURE — 250N000013 HC RX MED GY IP 250 OP 250 PS 637: Performed by: NURSE PRACTITIONER

## 2022-12-04 PROCEDURE — 99233 SBSQ HOSP IP/OBS HIGH 50: CPT | Performed by: NURSE PRACTITIONER

## 2022-12-04 RX ORDER — PROPRANOLOL HYDROCHLORIDE 20 MG/1
20 TABLET ORAL 2 TIMES DAILY
Status: DISCONTINUED | OUTPATIENT
Start: 2022-12-04 | End: 2022-12-04

## 2022-12-04 RX ORDER — LURASIDONE HYDROCHLORIDE 40 MG/1
40 TABLET, FILM COATED ORAL
Status: DISCONTINUED | OUTPATIENT
Start: 2022-12-04 | End: 2022-12-19 | Stop reason: HOSPADM

## 2022-12-04 RX ORDER — LITHIUM CARBONATE 300 MG/1
600 TABLET, FILM COATED, EXTENDED RELEASE ORAL AT BEDTIME
Status: DISCONTINUED | OUTPATIENT
Start: 2022-12-04 | End: 2022-12-05

## 2022-12-04 RX ORDER — PROPRANOLOL HYDROCHLORIDE 20 MG/1
20 TABLET ORAL 2 TIMES DAILY
Status: DISCONTINUED | OUTPATIENT
Start: 2022-12-04 | End: 2022-12-05

## 2022-12-04 RX ADMIN — PROPRANOLOL HYDROCHLORIDE 20 MG: 20 TABLET ORAL at 16:08

## 2022-12-04 RX ADMIN — LORAZEPAM 1.5 MG: 1 TABLET ORAL at 07:24

## 2022-12-04 RX ADMIN — LURASIDONE HYDROCHLORIDE 40 MG: 40 TABLET, FILM COATED ORAL at 17:27

## 2022-12-04 RX ADMIN — LORAZEPAM 1.5 MG: 1 TABLET ORAL at 12:03

## 2022-12-04 RX ADMIN — LITHIUM CARBONATE 600 MG: 300 TABLET, EXTENDED RELEASE ORAL at 20:07

## 2022-12-04 RX ADMIN — OLANZAPINE 10 MG: 10 TABLET, FILM COATED ORAL at 12:03

## 2022-12-04 RX ADMIN — PROPRANOLOL HYDROCHLORIDE 20 MG: 20 TABLET ORAL at 20:07

## 2022-12-04 RX ADMIN — LORAZEPAM 1.5 MG: 1 TABLET ORAL at 19:48

## 2022-12-04 RX ADMIN — HYDROXYZINE HYDROCHLORIDE 25 MG: 25 TABLET ORAL at 09:23

## 2022-12-04 ASSESSMENT — ACTIVITIES OF DAILY LIVING (ADL)
ORAL_HYGIENE: INDEPENDENT
ADLS_ACUITY_SCORE: 28
DRESS: SCRUBS (BEHAVIORAL HEALTH)
ORAL_HYGIENE: INDEPENDENT
HYGIENE/GROOMING: INDEPENDENT
ADLS_ACUITY_SCORE: 28
DRESS: SCRUBS (BEHAVIORAL HEALTH)
ADLS_ACUITY_SCORE: 28
LAUNDRY: UNABLE TO COMPLETE
ADLS_ACUITY_SCORE: 28
HYGIENE/GROOMING: INDEPENDENT
ADLS_ACUITY_SCORE: 28
ADLS_ACUITY_SCORE: 28

## 2022-12-04 NOTE — PLAN OF CARE
Problem: Adult Behavioral Health Plan of Care  Goal: Patient-Specific Goal (Individualization)  Description: Patient will be compliant with treatment team recommendations and medication administrations during hospitalization.  Patient will remain independent with ADLs daily.   Patient will sleep 6-8 hours per night.  Patient will eat at least 50% of meals daily.   Outcome: Progressing     Problem: Suicidal Behavior  Goal: Suicidal Behavior is Absent or Managed  Description: Patient will deny SI by discharge.   Patient will remain free from self harm/injury during hospitalization.   Patient will verbalize 3 coping skills by discharge.   Outcome: Progressing     Problem: Psychotic Signs/Symptoms  Goal: Improved Behavioral Control (Psychotic Signs/Symptoms)  Description: Patient will deny hallucinations by discharge.   Patient will remain in control of behaviors towards staff and peers during hospitalization.   Outcome: Progressing     Pt denies SI/HI, AH/VH, pain, depression. Pt endorses some anxiety. Pt is medication compliant and VSS. Pt ate 100% of dinner in lounge. Pt has a flat affect. Pt is calm and alert. Pt is using appropriate behavior with staff and peers. Pt isolative and withdrawn to room.     Face to face report will be communicated to oncoming RN.    Tayler Gusman RN  12/3/2022

## 2022-12-04 NOTE — PLAN OF CARE
"  Problem: Adult Behavioral Health Plan of Care  Goal: Patient-Specific Goal (Individualization)  Description: Patient will be compliant with treatment team recommendations and medication administrations during hospitalization.  Patient will remain independent with ADLs daily.   Patient will sleep 6-8 hours per night.  Patient will eat at least 50% of meals daily.   Outcome: Progressing     Problem: Suicidal Behavior  Goal: Suicidal Behavior is Absent or Managed  Description: Patient will deny SI by discharge.   Patient will remain free from self harm/injury during hospitalization.   Patient will verbalize 3 coping skills by discharge.   Outcome: Progressing     Problem: Psychotic Signs/Symptoms  Goal: Improved Behavioral Control (Psychotic Signs/Symptoms)  Description: Patient will deny hallucinations by discharge.   Patient will remain in control of behaviors towards staff and peers during hospitalization.   Outcome: Progressing   Goal Outcome Evaluation:             Pt. Was up and ate breakfast in dayroom, appetite is good, eating at least 50% of meals, taking prescribed medications, cooperative with treatment team recommendations, slept 7 hours last night, endorses depression and anxiety, denies suicidal ideation and hallucinations, is tearful after talking with her daughter on the phone, emotional support given, behavior has been in control so far this shift, resting in bed after phone call, will continue to monitor progress.  0923-  Pt. Requested/received atarax 25 mg po for anxiety.  1203-  Pt. Pacing in the hallway, stated \"I'm really irritable and angry right now, I need something\", Pt. Medicated with zyprexa 10 mg po for increasing agitation.      Face to face end of shift report will be communicated to oncoming afternoon shift RN.     Shilpa Boogie RN  12/4/2022  10:03 AM                 "

## 2022-12-04 NOTE — PLAN OF CARE
Problem: Adult Behavioral Health Plan of Care  Goal: Patient-Specific Goal (Individualization)  Description: Patient will be compliant with treatment team recommendations and medication administrations during hospitalization.  Patient will remain independent with ADLs daily.   Patient will sleep 6-8 hours per night.  Patient will eat at least 50% of meals daily.   Outcome: Progressing    Face to face end of shift report received from evening shift RN. Rounding completed. Pt observed in their own bed, with eyes closed, in right side-lying position, and with rested breathing. Pt accepts and receives this shift's scheduled medication. Will continue to support the needs of the patient. Face to face end of shift report to be given to day shift RN.     Pt slept approximately 7 hours.      Problem: Suicidal Behavior  Goal: Suicidal Behavior is Absent or Managed  Description: Patient will deny SI by discharge.   Patient will remain free from self harm/injury during hospitalization.   Patient will verbalize 3 coping skills by discharge.   Outcome: Progressing -- Pt remained free of self injury and harm throughout the duration of this shift.       Problem: Psychotic Signs/Symptoms  Goal: Improved Behavioral Control (Psychotic Signs/Symptoms)  Description: Patient will deny hallucinations by discharge.   Patient will remain in control of behaviors towards staff and peers during hospitalization.   Outcome: Progressing

## 2022-12-04 NOTE — PROGRESS NOTES
"[facility] PSYCHIATRY  PROGRESS NOTE     SUBJECTIVE   Ilana was was again very tearful today.  She states that she was feeling very restless and uncomfortable specifically in her legs today when she was trying to lay down.  I did did tell her that that is a high possibility that it is a side effect from the Latuda.  She did tell me that she did had taken Zyprexa just prior to the restlessness beginning for anxiety.  She states that the restlessness was only minor before taking the Zyprexa and feels that the Zyprexa may have made it worse.  It was the first time she had taken nightly Zyprexa from my understanding.  We did just increase the Latuda yesterday to 60 mg so could potentially be akathisia from this.  Will lower the Latuda back to 40 mg and discontinue the Zyprexa.  She is still making very hopeless statements such as \"I do not know what to do with my life from here I do not know how to get it back\" she cries as she said this.  She does state that the hallucinations are \"pretty much gone\"     MEDICATIONS   Scheduled Meds:    lithium ER  600 mg Oral At Bedtime     LORazepam  1.5 mg Oral TID     lurasidone  40 mg Oral Daily with supper     propranolol  20 mg Oral BID     PRN Meds:.acetaminophen, alum & mag hydroxide-simethicone, hydrOXYzine, melatonin, senna-docusate     ALLERGIES   Allergies   Allergen Reactions     Penicillins Unknown        MENTAL STATUS EXAM   Vitals: /51   Pulse 70   Temp 98.4  F (36.9  C) (Temporal)   Resp 14   Ht 1.626 m (5' 4\")   Wt 86.7 kg (191 lb 1.6 oz)   LMP  (LMP Unknown)   SpO2 95%   BMI 32.80 kg/m      Appearance: Alert, oriented, dressed in hospital scrubs  Attitude: Sad  Eye Cer nothing.   Speeontact: Intermittent  Mood: Depressed  Affect: Restricted range of affect.  Very tearful at times  Psychomotor Behavior: No tremor, rigidity, does report akathisia, or psychomotor retardation    Thought Process: Depressed hopeless  Associations: No loose associations "   Thought Content: Denies further hallucinations though is very tearful.  She denies suicidal thoughts  Insight: Fair   Judgment: Fair  Oriented to: Person, place, and time  Attention Span and Concentration: Intact  Recent and Remote Memory: Intact  Language: English with appropriate syntax and vocabulary  Fund of Knowledge: Average  Muscle Strength and Tone: Grossly normal  Gait and Station: Grossly normal       LABS   No results found for this or any previous visit (from the past 24 hour(s)).      IMPRESSION   This is a 37 year old female with a PMH of major depression and methamphetamine and alcohol use disorder previously admitted on this unit 8/2022 status post suicide attempt currently presenting with concerns for psychosis and catatonia with episodes in which the patient states she can hear, but cannot respond that started on 11/27/22. Patient was started on Clonidine and Risperdal 2 days prior to the onset. Will hold as they may be contributing factors. Patient notes worsening auditory hallucinations the past 2 months. Patient presents mute and uncooperative at times, question if volitional, certainly warrants continued monitoring. Unsure if patient has been abusing substances, she is prescribed Adderall. Given psychosis, will stop. Will further address medications after reassessing tomorrow. If patient does not clear and is agreeable, would reccommend first episode work-up.            DIAGNOSES   1. 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 Bipolar type 1 most recent mixed vs depressed with catatonia  3.amphetamine use disorder, moderate            PLAN     Location: Unit 5  Legal Status: Orders Placed This Encounter      Emergency Hospitalization Hold (72 Hr Hold)    Safety Assessment:    Behavioral Orders   Procedures     Code 1 - Restrict to Unit     Routine Programming     As clinically indicated     Status 15     Every 15 minutes.      PTA medications  continued/changed:     -Did not continue Risperdal due to worsened symptoms when initiated while at the [facility]    New medications tried and stopped:     -None    New medications initiated:     Start Ativan 2 mg twice a day: Patient refused  Start Depakote syrup 500 mg nightly: I suspect she will refuse this    Today's Changes:    Increase lithium to 600  Continue ativan to 1.5 tid  Decrease Latuda to 40 mg due to akathisia        Programming: Patient will be treated in a therapeutic milieu with appropriate individual and group therapies. Education will be provided on diagnoses, medications, and treatments.     Medical diagnoses:  Per medicine    Consult: None    Labs: first episode work up ordered    Anticipated LOS: 3-5 days  Disposition: likely home with family       TREATMENT TEAM CARE PLAN     Progress: Continued symptoms.  Petition filled out.     Continued Stay Criteria/Rationale: Continued symptoms without sufficient improvement/resolution.    Medical/Physical: See above.    Precautions: See above.     Plan: Continue inpatient care with unit support and medication management.    Rationale for change in precautions or plan: NA due to no change.    Participants: Rosemarie Davis NP, Nursing, SW, OT.    The patient's care was discussed with the treatment team and chart notes were reviewed.       ATTESTATION    Rosemarie Davis NP

## 2022-12-05 LAB — CERULOPLASMIN SERPL-MCNC: 21 MG/DL (ref 20–60)

## 2022-12-05 PROCEDURE — 250N000013 HC RX MED GY IP 250 OP 250 PS 637: Performed by: NURSE PRACTITIONER

## 2022-12-05 PROCEDURE — 124N000001 HC R&B MH

## 2022-12-05 PROCEDURE — 99233 SBSQ HOSP IP/OBS HIGH 50: CPT | Performed by: NURSE PRACTITIONER

## 2022-12-05 RX ORDER — LORAZEPAM 1 MG/1
2 TABLET ORAL 3 TIMES DAILY
Status: DISCONTINUED | OUTPATIENT
Start: 2022-12-05 | End: 2022-12-10

## 2022-12-05 RX ADMIN — HYDROXYZINE HYDROCHLORIDE 25 MG: 25 TABLET ORAL at 18:12

## 2022-12-05 RX ADMIN — LURASIDONE HYDROCHLORIDE 40 MG: 40 TABLET, FILM COATED ORAL at 17:31

## 2022-12-05 RX ADMIN — LITHIUM CARBONATE 750 MG: 450 TABLET, EXTENDED RELEASE ORAL at 20:33

## 2022-12-05 RX ADMIN — LORAZEPAM 2 MG: 1 TABLET ORAL at 18:11

## 2022-12-05 RX ADMIN — LORAZEPAM 1.5 MG: 1 TABLET ORAL at 06:10

## 2022-12-05 RX ADMIN — LORAZEPAM 2 MG: 1 TABLET ORAL at 12:52

## 2022-12-05 ASSESSMENT — ACTIVITIES OF DAILY LIVING (ADL)
ADLS_ACUITY_SCORE: 28
DRESS: SCRUBS (BEHAVIORAL HEALTH);INDEPENDENT
ORAL_HYGIENE: INDEPENDENT
ADLS_ACUITY_SCORE: 28
HYGIENE/GROOMING: INDEPENDENT
ADLS_ACUITY_SCORE: 28
HYGIENE/GROOMING: INDEPENDENT
LAUNDRY: UNABLE TO COMPLETE
ADLS_ACUITY_SCORE: 28
ADLS_ACUITY_SCORE: 28
ORAL_HYGIENE: INDEPENDENT
ADLS_ACUITY_SCORE: 28
ADLS_ACUITY_SCORE: 28
DRESS: SCRUBS (BEHAVIORAL HEALTH)
LAUNDRY: UNABLE TO COMPLETE

## 2022-12-05 NOTE — PLAN OF CARE
Problem: Adult Behavioral Health Plan of Care  Goal: Patient-Specific Goal (Individualization)  Description: Patient will be compliant with treatment team recommendations and medication administrations during hospitalization.  Patient will remain independent with ADLs daily.   Patient will sleep 6-8 hours per night.  Patient will eat at least 50% of meals daily.   Outcome: Progressing    Face to face end of shift report received from evening shift RN. Rounding completed. Pt observed in their own bed, with eyes closed, in left side-lying position, and with rested breathing. Pt accepts and receives this shift's scheduled medication. Will continue to support the needs of the patient. Face to face end of shift report to be given to day shift RN.     Pt slept approximately 7 hours.      Problem: Suicidal Behavior  Goal: Suicidal Behavior is Absent or Managed  Description: Patient will deny SI by discharge.   Patient will remain free from self harm/injury during hospitalization.   Patient will verbalize 3 coping skills by discharge.   Outcome: Progressing -- Pt remained free of self injury and harm throughout the duration of this shift.       Problem: Psychotic Signs/Symptoms  Goal: Improved Behavioral Control (Psychotic Signs/Symptoms)  Description: Patient will deny hallucinations by discharge.   Patient will remain in control of behaviors towards staff and peers during hospitalization.   Outcome: Progressing

## 2022-12-05 NOTE — PROGRESS NOTES
SYDNEE received an email from Bernardino Knutson with [facility]. He attempted to screen the patient, but she did not cooperate. He is submitting the Petition to the  to review.

## 2022-12-05 NOTE — PROGRESS NOTES
"[facility] PSYCHIATRY  PROGRESS NOTE     SUBJECTIVE   Ilana is less tearful today than yesterday.  She makes better eye contact and holds better conversation.  She does feel like the increase Ativan back to 2 mg 3 times a day was helpful and it clearly was as she is out in the lounge and appearing to be more social with other patients and is hardly tearful.  She is not experiencing any side effects from the lithium.  She does tell me that she is very nervous about the commitment and asks exactly what it entails.  She did seem to understand it after we spoke.  She has not used methamphetamine for over 50 days.  I asked her if she had any consideration of what type of programming she would want after the hospital and asked if she felt she needed chemical dependency treatment and she stated she did not feel she needed it.  I do think she has been clean for over 50 days..  MEDICATIONS   Scheduled Meds:    lithium ER  750 mg Oral At Bedtime     LORazepam  2 mg Oral TID     lurasidone  40 mg Oral Daily with supper     PRN Meds:.acetaminophen, alum & mag hydroxide-simethicone, hydrOXYzine, melatonin, senna-docusate     ALLERGIES   Allergies   Allergen Reactions     Penicillins Unknown        MENTAL STATUS EXAM   Vitals: /59 (BP Location: Right arm)   Pulse 73   Temp 98.4  F (36.9  C) (Temporal)   Resp 16   Ht 1.626 m (5' 4\")   Wt 86.7 kg (191 lb 1.6 oz)   LMP  (LMP Unknown)   SpO2 99%   BMI 32.80 kg/m      Appearance: Alert, oriented, dressed in hospital scrubs  Attitude: Sad  Eye Cer nothing.   Speeontact: Intermittent  Mood: Depressed  Affect: Restricted range of affect.  Very tearful at times  Psychomotor Behavior: No tremor, rigidity, does report akathisia, or psychomotor retardation    Thought Process: Depressed hopeless  Associations: No loose associations   Thought Content: Denies further hallucinations though is very tearful.  She denies suicidal thoughts  Insight: Fair   Judgment: " Fair  Oriented to: Person, place, and time  Attention Span and Concentration: Intact  Recent and Remote Memory: Intact  Language: English with appropriate syntax and vocabulary  Fund of Knowledge: Average  Muscle Strength and Tone: Grossly normal  Gait and Station: Grossly normal       LABS   No results found for this or any previous visit (from the past 24 hour(s)).      IMPRESSION   This is a 37 year old female with a PMH of major depression and methamphetamine and alcohol use disorder previously admitted on this unit 8/2022 status post suicide attempt currently presenting with concerns for psychosis and catatonia with episodes in which the patient states she can hear, but cannot respond that started on 11/27/22. Patient was started on Clonidine and Risperdal 2 days prior to the onset. Will hold as they may be contributing factors. Patient notes worsening auditory hallucinations the past 2 months. Patient presents mute and uncooperative at times, question if volitional, certainly warrants continued monitoring. Unsure if patient has been abusing substances, she is prescribed Adderall. Given psychosis, will stop. Will further address medications after reassessing tomorrow. If patient does not clear and is agreeable, would reccommend first episode work-up.            DIAGNOSES   1. 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 Bipolar type 1 most recent mixed vs depressed with catatonia  3.amphetamine use disorder, moderate            PLAN     Location: Unit 5  Legal Status: Orders Placed This Encounter      Emergency Hospitalization Hold (72 Hr Hold)    Safety Assessment:    Behavioral Orders   Procedures     Code 1 - Restrict to Unit     Routine Programming     As clinically indicated     Status 15     Every 15 minutes.      PTA medications continued/changed:     -Did not continue Risperdal due to worsened symptoms when initiated while at the [facility]  medications tried and stopped:     -None    New medications initiated:     Ativan to target catatonia.  When Ativan was decreased from 2 mg 3 times a day to 1.5 mg 3 times a day she did experience decompensation.  Likely titrate slowly and watch mood close.    Today's Changes:    -Continue Ativan 2 mg 3 times a day with plan to titrate once lithium level is therapeutic  -Possibly increase Latuda in near future to watch for akathisia as she did experience some in the past and the dose was decreased  -Continue lithium 750 mg  -TSH BMP CMP ordered for 12/10/2022        Programming: Patient will be treated in a therapeutic milieu with appropriate individual and group therapies. Education will be provided on diagnoses, medications, and treatments.     Medical diagnoses:  Per medicine    Consult: None    Labs: first episode work up ordered    Anticipated LOS: 3-5 days  Disposition: likely home with family       TREATMENT TEAM CARE PLAN     Progress: Continued symptoms.  Petition is being supported by [facility]    Continued Stay Criteria/Rationale: Continued symptoms without sufficient improvement/resolution.    Medical/Physical: See above.    Precautions: See above.     Plan: Continue inpatient care with unit support and medication management.    Rationale for change in precautions or plan: NA due to no change.    Participants: Rosemarie Davis NP, Nursing, SW, OT.    The patient's care was discussed with the treatment team and chart notes were reviewed.       ATTESTATION    Rosemarie Davis NP

## 2022-12-05 NOTE — PLAN OF CARE
"  Problem: Adult Behavioral Health Plan of Care  Goal: Patient-Specific Goal (Individualization)  Description: Patient will be compliant with treatment team recommendations and medication administrations during hospitalization.  Patient will remain independent with ADLs daily.   Patient will sleep 6-8 hours per night.  Patient will eat at least 50% of meals daily.     Patient is isolative and withdrawn, spending time in her room in bed. Affect is flat, mood is calm. Responses are more spontaneous and lengthy. She denies SI, HI, anxiety, depression, hallucinations, and pain. States she feels \"blah and emotional\" and is tearful as she says this. She reads in her room when awake. Has been appropriate with staff and peers.   Inderal was held due to low BP of 101/59 and 107/63, she reports she does experience \"some dizziness at times\" when she sits up, provider updated.   Face to face end of shift report communicated to oncoming RN.     Lola Jane RN  12/5/2022  2:37 PM    Outcome: Progressing     Problem: Suicidal Behavior  Goal: Suicidal Behavior is Absent or Managed  Description: Patient will deny SI by discharge.   Patient will remain free from self harm/injury during hospitalization.   Patient will verbalize 3 coping skills by discharge.     Patient has remained free from self harm/injury, she denies SI.  Outcome: Progressing     Problem: Psychotic Signs/Symptoms  Goal: Improved Behavioral Control (Psychotic Signs/Symptoms)  Description: Patient will deny hallucinations by discharge.   Patient will remain in control of behaviors towards staff and peers during hospitalization.     Patient denies hallucinations, has remained in control of behaviors.   Outcome: Progressing   Goal Outcome Evaluation:    Plan of Care Reviewed With: patient                   "

## 2022-12-05 NOTE — PROGRESS NOTES
"[facility] PSYCHIATRY  PROGRESS NOTE     SUBJECTIVE   Ilana still very tearful today.  She tells me \"I am so confused on how I even got here.  I was at a crisis center and then all of a sudden I could not talk and I could hardly move\" she cries as she tells me this.  She tells me that she feels worse now because she is constantly crying.  I did have to remind her that the states she came in was dangerous and even more dangerous than her tearfulness and depression is currently.  She was not even able to make her needs known the first few days she was here and she is now able to communicate.  She is very depressed and tearful.  She did fill out mood disorder questionnaire today and answered yes on all questions except for 1.  She states she has not heard any voices since yesterday and states yesterday she could not even tell what they were saying.     MEDICATIONS   Scheduled Meds:    lithium ER  750 mg Oral At Bedtime     LORazepam  2 mg Oral TID     lurasidone  40 mg Oral Daily with supper     PRN Meds:.acetaminophen, alum & mag hydroxide-simethicone, hydrOXYzine, melatonin, senna-docusate     ALLERGIES   Allergies   Allergen Reactions     Penicillins Unknown        MENTAL STATUS EXAM   Vitals: /59 (BP Location: Right arm)   Pulse 73   Temp 98.4  F (36.9  C) (Temporal)   Resp 16   Ht 1.626 m (5' 4\")   Wt 86.7 kg (191 lb 1.6 oz)   LMP  (LMP Unknown)   SpO2 99%   BMI 32.80 kg/m      Appearance: Alert, oriented, dressed in hospital scrubs  Attitude: Sad  Eye Cer nothing.   Speeontact: Intermittent  Mood: Depressed  Affect: Restricted range of affect.  Very tearful at times  Psychomotor Behavior: No tremor, rigidity, does report akathisia, or psychomotor retardation    Thought Process: Depressed hopeless  Associations: No loose associations   Thought Content: Denies further hallucinations though is very tearful.  She denies suicidal thoughts  Insight: Fair   Judgment: Fair  Oriented to: Person, place, " and time  Attention Span and Concentration: Intact  Recent and Remote Memory: Intact  Language: English with appropriate syntax and vocabulary  Fund of Knowledge: Average  Muscle Strength and Tone: Grossly normal  Gait and Station: Grossly normal       LABS   No results found for this or any previous visit (from the past 24 hour(s)).      IMPRESSION   This is a 37 year old female with a PMH of major depression and methamphetamine and alcohol use disorder previously admitted on this unit 8/2022 status post suicide attempt currently presenting with concerns for psychosis and catatonia with episodes in which the patient states she can hear, but cannot respond that started on 11/27/22. Patient was started on Clonidine and Risperdal 2 days prior to the onset. Will hold as they may be contributing factors. Patient notes worsening auditory hallucinations the past 2 months. Patient presents mute and uncooperative at times, question if volitional, certainly warrants continued monitoring. Unsure if patient has been abusing substances, she is prescribed Adderall. Given psychosis, will stop. Will further address medications after reassessing tomorrow. If patient does not clear and is agreeable, would reccommend first episode work-up.            DIAGNOSES   1. 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 Bipolar type 1 most recent mixed vs depressed with catatonia  3.amphetamine use disorder, moderate            PLAN     Location: Unit 5  Legal Status: Orders Placed This Encounter      Emergency Hospitalization Hold (72 Hr Hold)    Safety Assessment:    Behavioral Orders   Procedures     Code 1 - Restrict to Unit     Routine Programming     As clinically indicated     Status 15     Every 15 minutes.      PTA medications continued/changed:     -Did not continue Risperdal due to worsened symptoms when initiated while at the [facility]    New medications tried and stopped:      -None    New medications initiated:     Start Ativan 2 mg twice a day: Patient refused  Start Depakote syrup 500 mg nightly: I suspect she will refuse this    Today's Changes:    Increase lithium to 750 mg  Increase Ativan back to 2 mg 3 times daily.  When Ativan was lowered to 1.5 3 times daily her emotional lability increased significantly  Once lithium is more therapeutic will start to lower Ativan further.  We will wait to increase Latuda.  Monitoring for akathisia as she did have some yesterday though unsure if it was from Zyprexa or Latuda      Programming: Patient will be treated in a therapeutic milieu with appropriate individual and group therapies. Education will be provided on diagnoses, medications, and treatments.     Medical diagnoses:  Per medicine    Consult: None    Labs: first episode work up ordered    Anticipated LOS: 3-5 days  Disposition: likely home with family       TREATMENT TEAM CARE PLAN     Progress: Continued symptoms.  Petition filled out.     Continued Stay Criteria/Rationale: Continued symptoms without sufficient improvement/resolution.    Medical/Physical: See above.    Precautions: See above.     Plan: Continue inpatient care with unit support and medication management.    Rationale for change in precautions or plan: NA due to no change.    Participants: Rosemarie Davis NP, Nursing, SW, OT.    The patient's care was discussed with the treatment team and chart notes were reviewed.       ATTESTATION    Rosemarie Davis NP

## 2022-12-05 NOTE — PLAN OF CARE
Problem: Adult Behavioral Health Plan of Care  Goal: Plan of Care Review  Outcome: Progressing  Flowsheets (Taken 12/4/2022 1720)  Patient Agreement with Plan of Care: agrees     Problem: Suicidal Behavior  Goal: Suicidal Behavior is Absent or Managed  Description: Patient will deny SI by discharge.   Patient will remain free from self harm/injury during hospitalization.   Patient will verbalize 3 coping skills by discharge.   Outcome: Progressing     Problem: Psychotic Signs/Symptoms  Goal: Improved Behavioral Control (Psychotic Signs/Symptoms)  Description: Patient will deny hallucinations by discharge.   Patient will remain in control of behaviors towards staff and peers during hospitalization.   Outcome: Progressing     Pt denies SI/HI, AH/VH, pain, depression. Pt endorses anxiety. Pt is medication compliant and VSS. Pt ate 100% of dinner in lounge. Pt has a flat affect. Pt is pleasant and alert. Pt is using appropriate behavior with staff and peers. Pt isolative and withdrawn to room.     Face to face report will be communicated to oncoming RN.    Tayler Gusman RN  12/4/2022

## 2022-12-06 LAB — SARS-COV-2 RNA RESP QL NAA+PROBE: NEGATIVE

## 2022-12-06 PROCEDURE — 250N000013 HC RX MED GY IP 250 OP 250 PS 637: Performed by: NURSE PRACTITIONER

## 2022-12-06 PROCEDURE — 124N000001 HC R&B MH

## 2022-12-06 PROCEDURE — U0003 INFECTIOUS AGENT DETECTION BY NUCLEIC ACID (DNA OR RNA); SEVERE ACUTE RESPIRATORY SYNDROME CORONAVIRUS 2 (SARS-COV-2) (CORONAVIRUS DISEASE [COVID-19]), AMPLIFIED PROBE TECHNIQUE, MAKING USE OF HIGH THROUGHPUT TECHNOLOGIES AS DESCRIBED BY CMS-2020-01-R: HCPCS | Performed by: NURSE PRACTITIONER

## 2022-12-06 PROCEDURE — 99233 SBSQ HOSP IP/OBS HIGH 50: CPT | Performed by: NURSE PRACTITIONER

## 2022-12-06 RX ADMIN — LORAZEPAM 2 MG: 1 TABLET ORAL at 07:16

## 2022-12-06 RX ADMIN — LORAZEPAM 2 MG: 1 TABLET ORAL at 20:21

## 2022-12-06 RX ADMIN — LORAZEPAM 2 MG: 1 TABLET ORAL at 12:07

## 2022-12-06 RX ADMIN — LITHIUM CARBONATE 750 MG: 450 TABLET, EXTENDED RELEASE ORAL at 20:25

## 2022-12-06 RX ADMIN — HYDROXYZINE HYDROCHLORIDE 25 MG: 25 TABLET ORAL at 14:52

## 2022-12-06 RX ADMIN — LURASIDONE HYDROCHLORIDE 40 MG: 40 TABLET, FILM COATED ORAL at 17:33

## 2022-12-06 ASSESSMENT — ACTIVITIES OF DAILY LIVING (ADL)
ADLS_ACUITY_SCORE: 28
ADLS_ACUITY_SCORE: 28
ORAL_HYGIENE: INDEPENDENT
LAUNDRY: UNABLE TO COMPLETE
DRESS: SCRUBS (BEHAVIORAL HEALTH)
ADLS_ACUITY_SCORE: 28
HYGIENE/GROOMING: INDEPENDENT
ADLS_ACUITY_SCORE: 28

## 2022-12-06 NOTE — PROGRESS NOTES
The patient received her [facility] court hearing paper work with the dates. Her Preliminary Hearing is scheduled 12/09/2022 @ 10:30 am. Her commitment hearting is scheduled for 12/19/2022 @ 10:30 am.

## 2022-12-06 NOTE — PLAN OF CARE
"  Problem: Adult Behavioral Health Plan of Care  Goal: Patient-Specific Goal (Individualization)  Description: Patient will be compliant with treatment team recommendations and medication administrations during hospitalization.  Patient will remain independent with ADLs daily.   Patient will sleep 6-8 hours per night.  Patient will eat at least 50% of meals daily.     Patient is brighter and more engaging today. She spends time on the open unit, socializing with her peers. Affect is full range, mood is calm. She is tearful when she talks about her situation. States \"it's hard because you come here and it's safe, and there are no stressors. I get sad when I think about my kids, but that's normal. I do feel better, not totally better, but better. The voices are better, too. My body just shut down, and I couldn't talk\". She was given a list of her medications per her request. She watches tv and listens to music via headphones.   1452-patient upset about her court dates, tearful and angry, states \"I didn't do anything wrong, I went to [facility], and I wasn't using. Now I'm going to be stuck here, I want to go home\". She received Atarax 25 mg and went to her room.   Face to face end of shift report communicated to oncoming RN.     Lola Jane RN  12/6/2022  2:49 PM      Outcome: Progressing   Goal Outcome Evaluation:    Plan of Care Reviewed With: patient          Problem: Suicidal Behavior  Goal: Suicidal Behavior is Absent or Managed  Description: Patient will deny SI by discharge.   Patient will remain free from self harm/injury during hospitalization.   Patient will verbalize 3 coping skills by discharge.   Outcome: Progressing     Problem: Psychotic Signs/Symptoms  Goal: Improved Behavioral Control (Psychotic Signs/Symptoms)  Description: Patient will deny hallucinations by discharge.   Patient will remain in control of behaviors towards staff and peers during hospitalization.   Outcome: Progressing        "

## 2022-12-06 NOTE — PLAN OF CARE
Problem: Adult Behavioral Health Plan of Care  Goal: Patient-Specific Goal (Individualization)  Description: Patient will be compliant with treatment team recommendations and medication administrations during hospitalization.  Patient will remain independent with ADLs daily.   Patient will sleep 6-8 hours per night.  Patient will eat at least 50% of meals daily.   Outcome: Progressing   Goal Outcome Evaluation:       Face to face shift report received from RN. Rounding completed, pt observed. Client rested in room with eyes closed and respirations noted for 7 hours. Face to face report will be communicated to oncoming RN.    Tim Dubose RN  12/6/2022  6:14 AM

## 2022-12-06 NOTE — PLAN OF CARE
Problem: Adult Behavioral Health Plan of Care  Goal: Patient-Specific Goal (Individualization)  Description: Patient will be compliant with treatment team recommendations and medication administrations during hospitalization.  Patient will remain independent with ADLs daily.   Patient will sleep 6-8 hours per night.  Patient will eat at least 50% of meals daily.   Outcome: Not Progressing   Goal Outcome Evaluation:    Plan of Care Reviewed With: patient      Patient is Alert, able to make needs known. VSS, afebrile. Has been out in lounge on and off throughout the shift. Tends to keep to herself. Flat, blunt affect. Tearful. States she talked with her daughter tonight and patient stated it did not go well. Requested and received PRN atarax 25 mg PO for anxiety along with scheduled ativan. Patient laid in her bed for awhile after administration. Stated medication was effective. Reports Anxiety, denies depression, SI, SIB, HI, or pain. Did have a visitor tonight.   Ate 100% of dinner. Steady gait. No falls. Took medication scheduled medications.

## 2022-12-07 PROCEDURE — 124N000001 HC R&B MH

## 2022-12-07 PROCEDURE — 250N000013 HC RX MED GY IP 250 OP 250 PS 637: Performed by: NURSE PRACTITIONER

## 2022-12-07 PROCEDURE — 99233 SBSQ HOSP IP/OBS HIGH 50: CPT | Performed by: NURSE PRACTITIONER

## 2022-12-07 PROCEDURE — 99221 1ST HOSP IP/OBS SF/LOW 40: CPT | Performed by: NURSE PRACTITIONER

## 2022-12-07 RX ORDER — CLOTRIMAZOLE 1 %
CREAM (GRAM) TOPICAL 2 TIMES DAILY
Status: DISCONTINUED | OUTPATIENT
Start: 2022-12-07 | End: 2022-12-19 | Stop reason: HOSPADM

## 2022-12-07 RX ADMIN — HYDROXYZINE HYDROCHLORIDE 25 MG: 25 TABLET ORAL at 21:05

## 2022-12-07 RX ADMIN — HYDROXYZINE HYDROCHLORIDE 25 MG: 25 TABLET ORAL at 10:21

## 2022-12-07 RX ADMIN — LORAZEPAM 2 MG: 1 TABLET ORAL at 12:09

## 2022-12-07 RX ADMIN — LURASIDONE HYDROCHLORIDE 40 MG: 40 TABLET, FILM COATED ORAL at 17:12

## 2022-12-07 RX ADMIN — CLOTRIMAZOLE: 1 CREAM TOPICAL at 21:16

## 2022-12-07 RX ADMIN — LITHIUM CARBONATE 750 MG: 450 TABLET, EXTENDED RELEASE ORAL at 20:19

## 2022-12-07 RX ADMIN — METFORMIN HYDROCHLORIDE 250 MG: 500 TABLET ORAL at 17:12

## 2022-12-07 RX ADMIN — MELATONIN 3 MG: 3 TAB ORAL at 21:05

## 2022-12-07 RX ADMIN — CLOTRIMAZOLE: 1 CREAM TOPICAL at 12:32

## 2022-12-07 RX ADMIN — LORAZEPAM 2 MG: 1 TABLET ORAL at 07:56

## 2022-12-07 RX ADMIN — LORAZEPAM 2 MG: 1 TABLET ORAL at 20:18

## 2022-12-07 ASSESSMENT — ACTIVITIES OF DAILY LIVING (ADL)
ADLS_ACUITY_SCORE: 28
ADLS_ACUITY_SCORE: 28
ORAL_HYGIENE: INDEPENDENT
ADLS_ACUITY_SCORE: 28
DRESS: SCRUBS (BEHAVIORAL HEALTH)
ADLS_ACUITY_SCORE: 28
LAUNDRY: UNABLE TO COMPLETE
HYGIENE/GROOMING: INDEPENDENT
ADLS_ACUITY_SCORE: 28

## 2022-12-07 NOTE — PROGRESS NOTES
"[facility] PSYCHIATRY  PROGRESS NOTE     SUBJECTIVE   Prior to interviewing the patient, I met with nursing and reviewed patient's clinical condition. We discussed clinical care both before and after the interview. I have reviewed the patient's clinical course by review of records including previous notes, labs, and vital signs.     Per nursing, the patient had the following behavioral events over the last 24-hours: none    On psychiatric interview, patient met with in her room where she was found in bed resting.  She remembered me from last week. She was very tearful, verbalized frustration with her mood being \"very rollercoastery\" and feels as though she should be doing better at this point in her hospital stay. She reports fear and anxiety related to her upcoming court hearings. I pointed out that just her being able to communicate these feelings is huge progress as last week she couldn't. She asked for a pregnancy test due to concerns of increased appetite the last couple days and reassured that her pregnancy test was negative on admission.  Discussed option of metformin as off label use for antipsychotic induced weight gain in which patient verbalized desire to trial. She reports restless sleep and reminded that she does have Melatonin available to try as needed.     MEDICATIONS   Scheduled Meds:    clotrimazole   Topical BID     lithium ER  750 mg Oral At Bedtime     LORazepam  2 mg Oral TID     lurasidone  40 mg Oral Daily with supper     metFORMIN  250 mg Oral BID w/meals     PRN Meds:.acetaminophen, alum & mag hydroxide-simethicone, hydrOXYzine, melatonin, senna-docusate     ALLERGIES   Allergies   Allergen Reactions     Penicillins Unknown        MENTAL STATUS EXAM   Vitals: /74   Pulse 75   Temp 97.7  F (36.5  C) (Temporal)   Resp 16   Ht 1.626 m (5' 4\")   Wt 86.7 kg (191 lb 1.6 oz)   LMP  (LMP Unknown)   SpO2 98%   BMI 32.80 kg/m      Appearance: Alert, oriented, dressed in hospital " scrubs  Attitude: Cooperative  Speech: Intermittent  Mood: Depressed, hopeless  Affect: Restricted range of affect.  Very tearful at times  Psychomotor Behavior: No tremor, rigidity, does report akathisia, or psychomotor retardation    Thought Process: Logical, linear,  Associations: No loose associations   Thought Content: Denies SI, HI and thoughts of self harm. No psychotic or delusional thought noted.   Insight: Fair   Judgment: Fair  Oriented to: Person, place, and time  Attention Span and Concentration: Intact  Recent and Remote Memory: Intact  Language: English with appropriate syntax and vocabulary  Fund of Knowledge: Average  Muscle Strength and Tone: Grossly normal  Gait and Station: Grossly normal       LABS   No results found for this or any previous visit (from the past 24 hour(s)).      IMPRESSION   This is a 37 year old female with a PMH of major depression and methamphetamine and alcohol use disorder previously admitted on this unit 8/2022 status post suicide attempt currently presenting with concerns for psychosis and catatonia with episodes in which the patient states she can hear, but cannot respond that started on 11/27/22. Patient was started on Clonidine and Risperdal 2 days prior to the onset. Will hold as they may be contributing factors. Patient notes worsening auditory hallucinations the past 2 months. Patient presents mute and uncooperative at times, question if volitional, certainly warrants continued monitoring. Unsure if patient has been abusing substances, she is prescribed Adderall. Given psychosis, will stop. Will further address medications after reassessing tomorrow. If patient does not clear and is agreeable, would reccommend first episode work-up.        DIAGNOSES   1. 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 Bipolar type 1 most recent mixed vs depressed with catatonia  3. Amphetamine use disorder, moderate     PLAN     Location:  Unit 5  Legal Status: Court Hold  Her Preliminary Hearing is scheduled 12/09/2022 @ 10:30 am. Her commitment hearing is scheduled for 12/19/2022 @ 10:30 am.    Safety Assessment:    Behavioral Orders   Procedures     Code 1 - Restrict to Unit     Routine Programming     As clinically indicated     Status 15     Every 15 minutes.      PTA medications continued/changed:     -Did not continue Risperdal due to worsened symptoms when initiated while at the [facility]    New medications tried and stopped:     -None    New medications initiated:     Ativan to target catatonia.  When Ativan was decreased from 2 mg 3 times a day to 1.5 mg 3 times a day she did experience decompensation.  Likely titrate slowly and watch mood close.    -Continue Ativan 2 mg 3 times a day with plan to titrate once lithium level is therapeutic  -Possibly increase Latuda in near future to watch for akathisia as she did experience some in the past and the dose was decreased  -Continue lithium 750 mg QHS   -TSH BMP CMP and Lithium level ordered for 12/10/2022    Today's Changes:   -Start Metformin 250 mg BID - increase as tolerated    Programming: Patient will be treated in a therapeutic milieu with appropriate individual and group therapies. Education will be provided on diagnoses, medications, and treatments.     Medical diagnoses:  Per medicine    Consult: None    Labs: first episode work up ordered    Anticipated LOS: 3-5 days  Disposition: likely home with family       ATTESTATION      La Porter NP

## 2022-12-07 NOTE — PLAN OF CARE
"Face to face end of shift report communicated to oncoming shift.     Brenda Purvis RN  12/6/2022  11:06 PM       Problem: Adult Behavioral Health Plan of Care  Goal: Patient-Specific Goal (Individualization)  Description: Patient will be compliant with treatment team recommendations and medication administrations during hospitalization.  Patient will remain independent with ADLs daily.   Patient will sleep 6-8 hours per night.  Patient will eat at least 50% of meals daily.   Outcome: Progressing  Note: Patient up on unit this shift.  Patient is polite and cooperative with nursing cares and assessment.  Patient denies SI, HI, AH and VH.  Patient expresses hopefulness.  At nurses station reporting some anxiety r/t the [place], \"that patient has been agitated all day and it just gets to me, but I'm hoping it's almost group time and I can go to group and that will help me feel better\" Offered patient scheduled medication at this time it's 1800, patient states \"I'd rather not, I'd like to see if group helps\"   Patient eats 100% of dinner, plays cards with peers.    Patient attends group in full length, full range affect noted, patient participates, scheduled medication brought to group, patient reports feeling better and not needing the medication as much as previously.     Patient expresses concern that her roommate is controlling of the lights and temperature in the room causing undo anxiety for her like \"I feel uncomfortable going into my own room\"  Explained to patient to inform staff if continues to be bothersome.   Goal Outcome Evaluation:    Plan of Care Reviewed With: patient                   "

## 2022-12-07 NOTE — PLAN OF CARE
Problem: Adult Behavioral Health Plan of Care  Goal: Patient-Specific Goal (Individualization)  Description: Patient will be compliant with treatment team recommendations and medication administrations during hospitalization.  Patient will remain independent with ADLs daily.   Patient will sleep 6-8 hours per night.  Patient will eat at least 50% of meals daily.   Outcome: Progressing   Goal Outcome Evaluation:       Face to face shift report received from RN. Rounding completed, pt observed. Client rested in room for 7 hours with eyes closed and respirations noted.Face to face report will be communicated to oncoming RN.    Tim Dubose RN  12/7/2022  6:18 AM

## 2022-12-07 NOTE — H&P
[facility]    History and Physical  Medical Services       Date of Admission:  2022  Date of Service (when I saw the patient): 22    Assessment & Plan     Active Medical Problems:  Tinea Pedis- pt complaining of burning and itching between toes. She reports its better since putting lotion on but is peeling. Bilateral feet between toes has minimal peeling, mild erythema. Lotrimin cream bid. Nursing to monitor for new or worsening symptoms.     Pt medically stable, no acute medical concerns. Chronic medical problems stable. Will sign off. Please consult for any new medical issues or concerns.         Code Status: Full Code    Lindsay Mcmahon CNP    Primary Care Physician   Physician No Ref-Primary    Chief Complaint   Psych evaluation     History is obtained from the patient and medical chart     History of Present Illness    (per ED) Ilana Aguilar is a 37-year-old-female with a past medical history significant for but not limited to panic disorder with agoraphobia, major depressive disorder, insomnia, methamphetamine abuse, alcohol abuse, and history of suicide attempts presenting to the ED via private vehicle from [facility] for concerns of catatonia.      Past Medical History    I have reviewed this patient's medical history and updated it with pertinent information if needed.   Past Medical History:   Diagnosis Date     Acute vaginitis     History of bacterial vaginosis     Anogenital (venereal) warts     No Comments Provided     Encounter for insertion of intrauterine contraceptive device     16,Paragard Lot# [id number] Exp 2020     Injury of left ankle     Left ankle - denied per patient     Personal history of other medical treatment (CODE)     ,  ( one AB at age 20)     Personal history of urinary calculi     No Comments Provided     Personal history of urinary infection     No Comments Provided     Scoliosis     No Comments Provided     Social phobia     No  Comments Provided       Past Surgical History   I have reviewed this patient's surgical history and updated it with pertinent information if needed.  Past Surgical History:   Procedure Laterality Date     OTHER SURGICAL HISTORY      [id number],COLPOSCOPY       Prior to Admission Medications   Prior to Admission Medications   Prescriptions Last Dose Informant Patient Reported? Taking?   TRINTELLIX 5 MG tablet  Pharmacy Yes No   Sig: Take 5 mg by mouth daily   busPIRone (BUSPAR) 10 MG tablet  Pharmacy No No   Sig: Take 1 tablet (10 mg) by mouth 3 times daily   cefdinir (OMNICEF) 300 MG capsule  Pharmacy No No   Sig: Take 1 capsule (300 mg) by mouth 2 times daily for 7 days   cloNIDine (CATAPRES) 0.1 MG tablet  Pharmacy Yes No   Sig: Take 1 tablet (0.1 mg) by mouth once daily at bedtime. Take 1/2 tablet (0.05 mg) once daily as needed for anxiety.   hydrOXYzine (ATARAX) 25 MG tablet   Yes Yes   Sig: Take 25 mg by mouth 3 times daily   risperiDONE (RISPERDAL) 0.5 MG tablet  Pharmacy Yes No   Sig: Take 1-2 tablets by mouth 2 times daily      Facility-Administered Medications: None     Allergies   Allergies   Allergen Reactions     Penicillins Unknown       Social History   I have reviewed this patient's social history and updated it with pertinent information if needed. Ilana Aguilar  reports that she has never smoked. She has never been exposed to tobacco smoke. She has never used smokeless tobacco. She reports that she does not drink alcohol and does not use drugs.    Family History   I have reviewed this patient's family history and updated it with pertinent information if needed.   Family History   Problem Relation Age of Onset     Other - See Comments Mother         with traumatic brain injury,/Psychiatric illness,depression     Substance Abuse Mother         Alcohol/Drug,chemical dependency     Heart Disease Mother         Heart Disease,  of heart failure at age 40.     Unknown/Adopted Father          Unknown,Unknown to patient     Family History Negative Brother         Good Health     Genetic Disorder No family hx of         Genetic,Denies any family history of cancer, MI or thyroid disorders.       Review of Systems   CONSTITUTIONAL:  negative  EYES:  negative  HEENT:  negative  RESPIRATORY:  negative  CARDIOVASCULAR:  negative  GASTROINTESTINAL:  negative  GENITOURINARY:  negative  INTEGUMENT/BREAST:  Negative except for itching between toes.   HEMATOLOGIC/LYMPHATIC:  negative  ALLERGIC/IMMUNOLOGIC:  negative  ENDOCRINE:  negative  MUSCULOSKELETAL:  negative  NEUROLOGICAL:  negative    Physical Exam   Temp: 97.9  F (36.6  C) Temp src: Temporal BP: 105/73 Pulse: 77   Resp: 16 SpO2: 99 % O2 Device: None (Room air)    Vital Signs with Ranges  Temp:  [97.9  F (36.6  C)-98.9  F (37.2  C)] 97.9  F (36.6  C)  Pulse:  [77-88] 77  Resp:  [16] 16  BP: (105-122)/(73-78) 105/73  SpO2:  [98 %-99 %] 99 %  191 lbs 1.6 oz    Constitutional: awake, alert, cooperative, no apparent distress, and appears stated age, vitals stable   Eyes: Lids and lashes normal, pupils equal, round and reactive to light, extra ocular muscles intact, sclera clear, conjunctiva normal  ENT: Normocephalic, without obvious abnormality, atraumatic, external ears without lesions, oral pharynx with moist mucous membranes, no erythema or exudates  Hematologic / Lymphatic: no cervical lymphadenopathy  Respiratory: No increased work of breathing, good air exchange, clear to auscultation bilaterally, no crackles or wheezing  Cardiovascular: Normal apical impulse, regular rate and rhythm, normal S1 and S2, no S3 or S4, and no murmur noted  GI: normal bowel sounds, soft, non-distended, non-tender, no masses palpated, no hepatosplenomegally  Genitounirinary: deferred  Skin: peeling between toes, minimal erythema between toes, no open skin, otherwise normal skin color, texture, turgor, no redness, warmth, or swelling and no rashes  Musculoskeletal: There is no  redness, warmth, or swelling of the joints.  Full range of motion noted.    Neurologic: Awake, alert, oriented to name, place and time.    Neuropsychiatric: General: normal, calm and normal eye contact    Data   Data reviewed today:   Recent Labs   Lab 11/30/22  1638   WBC 6.9   HGB 13.2   MCV 87         POTASSIUM 3.9   CHLORIDE 104   CO2 22   BUN 9.5   CR 0.82   ANIONGAP 12   COOPER 8.6   *   ALBUMIN 3.8   PROTTOTAL 6.2*   BILITOTAL <0.2   ALKPHOS 58   ALT 11   AST 10       No results found for this or any previous visit (from the past 24 hour(s)).

## 2022-12-07 NOTE — PLAN OF CARE
"  Problem: Adult Behavioral Health Plan of Care  Goal: Patient-Specific Goal (Individualization)  Description: Patient will be compliant with treatment team recommendations and medication administrations during hospitalization.  Patient will remain independent with ADLs daily.   Patient will sleep 6-8 hours per night.  Patient will eat at least 50% of meals daily.     Patient is pleasant and cooperative. Affect is full range, mood is calm. She denies SI, HI, anxiety, depression, hallucinations, and pain. States she is \"okay\". She is accepting of having to be here until her second court date on December 19, states \"I was really angry yesterday, but I'm okay today\". She spends time on the open unit, sitting in the lounge watching tv. Has been appropriate with staff and peers.   1021-requested and accepted Atarax 25 mg for anxiety, she is tearful, states \"my daughter is sick, I feel like I need to be there\". She went to her room to lay down.  1400-patient sleeping.   1450-patient awake, requesting a snack.  Face to face end of shift report communicated to oncoming RN.     Lola Jane RN  12/7/2022  2:09 PM    Outcome: Progressing     Problem: Suicidal Behavior  Goal: Suicidal Behavior is Absent or Managed  Description: Patient will deny SI by discharge.   Patient will remain free from self harm/injury during hospitalization.   Patient will verbalize 3 coping skills by discharge.   Outcome: Progressing     Problem: Psychotic Signs/Symptoms  Goal: Improved Behavioral Control (Psychotic Signs/Symptoms)  Description: Patient will deny hallucinations by discharge.   Patient will remain in control of behaviors towards staff and peers during hospitalization.   Outcome: Progressing   Goal Outcome Evaluation:    Plan of Care Reviewed With: patient                   "

## 2022-12-08 PROCEDURE — 250N000013 HC RX MED GY IP 250 OP 250 PS 637: Performed by: NURSE PRACTITIONER

## 2022-12-08 PROCEDURE — 124N000001 HC R&B MH

## 2022-12-08 RX ADMIN — MELATONIN 3 MG: 3 TAB ORAL at 20:18

## 2022-12-08 RX ADMIN — LORAZEPAM 2 MG: 1 TABLET ORAL at 20:15

## 2022-12-08 RX ADMIN — CLOTRIMAZOLE: 1 CREAM TOPICAL at 20:18

## 2022-12-08 RX ADMIN — CLOTRIMAZOLE: 1 CREAM TOPICAL at 08:16

## 2022-12-08 RX ADMIN — METFORMIN HYDROCHLORIDE 250 MG: 500 TABLET ORAL at 08:14

## 2022-12-08 RX ADMIN — LORAZEPAM 2 MG: 1 TABLET ORAL at 06:10

## 2022-12-08 RX ADMIN — LORAZEPAM 2 MG: 1 TABLET ORAL at 11:22

## 2022-12-08 RX ADMIN — ALUMINUM HYDROXIDE, MAGNESIUM HYDROXIDE, AND SIMETHICONE 30 ML: 200; 200; 20 SUSPENSION ORAL at 11:22

## 2022-12-08 RX ADMIN — LITHIUM CARBONATE 750 MG: 450 TABLET, EXTENDED RELEASE ORAL at 20:15

## 2022-12-08 RX ADMIN — HYDROXYZINE HYDROCHLORIDE 25 MG: 25 TABLET ORAL at 20:18

## 2022-12-08 RX ADMIN — LURASIDONE HYDROCHLORIDE 40 MG: 40 TABLET, FILM COATED ORAL at 16:55

## 2022-12-08 RX ADMIN — METFORMIN HYDROCHLORIDE 250 MG: 500 TABLET ORAL at 16:55

## 2022-12-08 RX ADMIN — HYDROXYZINE HYDROCHLORIDE 25 MG: 25 TABLET ORAL at 10:09

## 2022-12-08 ASSESSMENT — ACTIVITIES OF DAILY LIVING (ADL)
ADLS_ACUITY_SCORE: 28

## 2022-12-08 NOTE — PLAN OF CARE
Problem: Adult Behavioral Health Plan of Care  Goal: Patient-Specific Goal (Individualization)  Description: Patient will be compliant with treatment team recommendations and medication administrations during hospitalization.  Patient will remain independent with ADLs daily.   Patient will sleep 6-8 hours per night.  Patient will eat at least 50% of meals daily.   Outcome: Progressing     Problem: Suicidal Behavior  Goal: Suicidal Behavior is Absent or Managed  Description: Patient will deny SI by discharge.   Patient will remain free from self harm/injury during hospitalization.   Patient will verbalize 3 coping skills by discharge.   Outcome: Progressing     Problem: Psychotic Signs/Symptoms  Goal: Improved Behavioral Control (Psychotic Signs/Symptoms)  Description: Patient will deny hallucinations by discharge.   Patient will remain in control of behaviors towards staff and peers during hospitalization.   Outcome: Progressing     Face to face shift report received from Brenda VIZCAINO RN. Rounding completed, pt observed laying in bed, appeared to be sleeping.    Patient appeared to be sleeping for approximately 8 hours since 2200 last shift.    Patient had no reported or observed suicidal behavior or self harm this shift.      Patient took early morning medications as ordered.     Patient's morning vitals as follows; Blood Pressure 110/74  Temp 97.7 Pulse 75 Respirations 16 SpO2 98% RA    Face to face report will be communicated to oncoming RN.    Miranda Gomez RN  12/8/2022  6:18 AM

## 2022-12-08 NOTE — PROGRESS NOTES
1:1 time with the patient.  No changes made to the discharge plan at this time.   See the AVS for follow up appointments and recommendations.     The patient met with the court examiner.

## 2022-12-08 NOTE — PLAN OF CARE
"Face to face shift report received from Miranda HARTMAN. Rounding completed, pt observed in the lounge at the start of the shift.    Patient in and out of room throughout the day. Alert and making needs known. Ate 100% of breakfast and lunch. Pleasant during conversation with nursing staff. Compliant with scheduled medications and nursing assessment. Requested and received hydroxyzine 25 mg at 1009 for increased anxiety. Patient tearful with this writer stating \"I just feel hopeless and blah today. I'm scared for the unknown.\" Denies hallucinations, SI/HI, and pain. Patient states she feels like her medications have been working well otherwise. Patient has been attending groups throughout the day and coloring in the lounge with other peers.    Problem: Adult Behavioral Health Plan of Care  Goal: Patient-Specific Goal (Individualization)  Description: Patient will be compliant with treatment team recommendations and medication administrations during hospitalization.  Patient will remain independent with ADLs daily.   Patient will sleep 6-8 hours per night.  Patient will eat at least 50% of meals daily.   Outcome: Progressing     Problem: Suicidal Behavior  Goal: Suicidal Behavior is Absent or Managed  Description: Patient will deny SI by discharge.   Patient will remain free from self harm/injury during hospitalization.   Patient will verbalize 3 coping skills by discharge.   Outcome: Progressing     Problem: Psychotic Signs/Symptoms  Goal: Improved Behavioral Control (Psychotic Signs/Symptoms)  Description: Patient will deny hallucinations by discharge.   Patient will remain in control of behaviors towards staff and peers during hospitalization.   Outcome: Progressing    Face to face report will be communicated to oncbeverley HARTMAN.    Catarina Petty RN  12/8/2022                          "

## 2022-12-08 NOTE — PLAN OF CARE
Face to face end of shift report communicated to oncoming shift.     Brenda Purvis RN  12/7/2022  11:05 PM       Problem: Adult Behavioral Health Plan of Care  Goal: Patient-Specific Goal (Individualization)  Description: Patient will be compliant with treatment team recommendations and medication administrations during hospitalization.  Patient will remain independent with ADLs daily.   Patient will sleep 6-8 hours per night.  Patient will eat at least 50% of meals daily.   Outcome: Progressing  Note: Patient up on unit this shift, attends all groups actively participates.  Patient eats 100% of dinner.    Patient lets needs be known. Educated on prescribed medications, takes as prescribed.   Polite and cooperative with nursing assessment and cares.   Patient denies SI, HI, AH, VH, depression. Endorses anxiety feeling guilty being here and not being home.      PRN:  Melatonin 3 mg and Atarax 25 mg @ 2105 for sleep/anxiety.  Patient exhibits full range affect.  Remains free from self harm/falls.   Patient showers, independent with all ADL's.     Patient in room 2110.     Goal Outcome Evaluation:

## 2022-12-09 PROCEDURE — 250N000013 HC RX MED GY IP 250 OP 250 PS 637: Performed by: NURSE PRACTITIONER

## 2022-12-09 PROCEDURE — 124N000001 HC R&B MH

## 2022-12-09 PROCEDURE — 99232 SBSQ HOSP IP/OBS MODERATE 35: CPT | Performed by: NURSE PRACTITIONER

## 2022-12-09 RX ORDER — GABAPENTIN 100 MG/1
100 CAPSULE ORAL 3 TIMES DAILY PRN
Status: DISCONTINUED | OUTPATIENT
Start: 2022-12-09 | End: 2022-12-10

## 2022-12-09 RX ADMIN — MELATONIN 3 MG: 3 TAB ORAL at 20:24

## 2022-12-09 RX ADMIN — LORAZEPAM 2 MG: 1 TABLET ORAL at 20:24

## 2022-12-09 RX ADMIN — GABAPENTIN 100 MG: 100 CAPSULE ORAL at 15:55

## 2022-12-09 RX ADMIN — LITHIUM CARBONATE 750 MG: 450 TABLET, EXTENDED RELEASE ORAL at 20:24

## 2022-12-09 RX ADMIN — CLOTRIMAZOLE: 1 CREAM TOPICAL at 09:16

## 2022-12-09 RX ADMIN — HYDROXYZINE HYDROCHLORIDE 25 MG: 25 TABLET ORAL at 10:18

## 2022-12-09 RX ADMIN — LURASIDONE HYDROCHLORIDE 40 MG: 40 TABLET, FILM COATED ORAL at 17:26

## 2022-12-09 RX ADMIN — HYDROXYZINE HYDROCHLORIDE 25 MG: 25 TABLET ORAL at 15:55

## 2022-12-09 RX ADMIN — METFORMIN HYDROCHLORIDE 250 MG: 500 TABLET ORAL at 08:03

## 2022-12-09 RX ADMIN — LORAZEPAM 2 MG: 1 TABLET ORAL at 06:44

## 2022-12-09 RX ADMIN — METFORMIN HYDROCHLORIDE 250 MG: 500 TABLET ORAL at 17:26

## 2022-12-09 RX ADMIN — LORAZEPAM 2 MG: 1 TABLET ORAL at 11:29

## 2022-12-09 RX ADMIN — HYDROXYZINE HYDROCHLORIDE 25 MG: 25 TABLET ORAL at 20:24

## 2022-12-09 ASSESSMENT — ACTIVITIES OF DAILY LIVING (ADL)
ADLS_ACUITY_SCORE: 28
ORAL_HYGIENE: INDEPENDENT
HYGIENE/GROOMING: INDEPENDENT
ADLS_ACUITY_SCORE: 28
LAUNDRY: UNABLE TO COMPLETE
ADLS_ACUITY_SCORE: 28
DRESS: INDEPENDENT

## 2022-12-09 NOTE — PROGRESS NOTES
The patient had her Preliminary Hearing this morning @ 10:50 am. The patient is confined until her next hearing scheduled for 12/19/2022 @ 10:30 am.   Nursing updated

## 2022-12-09 NOTE — PLAN OF CARE
"Face to face end of shift report communicated to oncoming shift.     Brenda Purvis RN  12/8/2022  11:14 PM       Problem: Adult Behavioral Health Plan of Care  Goal: Patient-Specific Goal (Individualization)  Description: Patient will be compliant with treatment team recommendations and medication administrations during hospitalization.  Patient will remain independent with ADLs daily.   Patient will sleep 6-8 hours per night.  Patient will eat at least 50% of meals daily.   Outcome: Progressing  Note: Patient up on unit for dinner, patient eats 100%.  Patient needs to be woke up for medications this shift, patient takes medications without issue.   Patient has been in bed sleeping the majority of this shift.  Patient reports \"I'm just very worried about my daughter, it's easier if I can sleep and not have to think so much\"    Patient denies pain, SI, HI, AH and VH.      Patient requests melatonin and atarax at bedtime.  PRN:  atarax 25 mg and melatonin 3 mg given @ 2018.  Patient eats snack and returns to bed with headphones on.    Goal Outcome Evaluation:                        "

## 2022-12-09 NOTE — PLAN OF CARE
Problem: Adult Behavioral Health Plan of Care  Goal: Patient-Specific Goal (Individualization)  Description: Patient will be compliant with treatment team recommendations and medication administrations during hospitalization.  Patient will remain independent with ADLs daily.   Patient will sleep 6-8 hours per night.  Patient will eat at least 50% of meals daily.   Outcome: Progressing     Problem: Suicidal Behavior  Goal: Suicidal Behavior is Absent or Managed  Description: Patient will deny SI by discharge.   Patient will remain free from self harm/injury during hospitalization.   Patient will verbalize 3 coping skills by discharge.   Outcome: Progressing     Problem: Psychotic Signs/Symptoms  Goal: Improved Behavioral Control (Psychotic Signs/Symptoms)  Description: Patient will deny hallucinations by discharge.   Patient will remain in control of behaviors towards staff and peers during hospitalization.   Outcome: Progressing     Face to face shift report received from MINNIE Gutierrez. Rounding completed, pt observed laying in bed, appeared to be resting..    Patient appeared to be sleeping for approximately 6.75 hours since 2345.    Patient had no reported or observed suicidal behavior or self harm this shift.      Patient had no reported hallucinations or paranoia noted.    Patient was not observed to have been responding to internal stimuli.    Patient took early morning medications as ordered.     Patient's morning vitals as follows; Blood Pressure 107/71  Temp 97 Pulse 78 Respirations 14 SpO2 95% RA    Face to face report will be communicated to oncoming RN.    Miranda Gomez RN  12/9/2022  6:47 AM

## 2022-12-09 NOTE — PLAN OF CARE
Problem: Adult Behavioral Health Plan of Care  Goal: Plan of Care Review  Recent Flowsheet Documentation  Taken 12/9/2022 1000 by Aga Henao RN  Patient Agreement with Plan of Care: agrees     Problem: Suicidal Behavior  Goal: Suicidal Behavior is Absent or Managed  Description: Patient will deny SI by discharge.   Patient will remain free from self harm/injury during hospitalization.   Patient will verbalize 3 coping skills by discharge.   Outcome: Progressing     Problem: Psychotic Signs/Symptoms  Goal: Improved Behavioral Control (Psychotic Signs/Symptoms)  Description: Patient will deny hallucinations by discharge.   Patient will remain in control of behaviors towards staff and peers during hospitalization.   Outcome: Progressing     Problem: Adult Behavioral Health Plan of Care  Goal: Patient-Specific Goal (Individualization)  Description: Patient will be compliant with treatment team recommendations and medication administrations during hospitalization.  Patient will remain independent with ADLs daily.   Patient will sleep 6-8 hours per night.  Patient will eat at least 50% of meals daily.   Outcome: Progressing     Face to Face report received from MINNIE Jean.  Rounding completed. Patient observed in the open lounge socializing with her peers.      Patient is calm, cooperative & medication compliant.  Patient asks for and receives Atarax 25 mg at 1018 to help with anxiety prior to her court appearance.    She is visibly upset after court and is given scheduled Ativan which helps with her frustrations.  Patient eats meals in the lounge.  Patient is withdrawn & isolative to her room after lunch.  Patient denies pain, SI or hallucinations.      Face to face end of shift report communicated to oncoming evening shift RN.     Aga Henao RN  12/9/2022  2:52 PM

## 2022-12-09 NOTE — PROGRESS NOTES
"[facility] PSYCHIATRY  PROGRESS NOTE     SUBJECTIVE   Prior to interviewing the patient, I met with nursing and reviewed patient's clinical condition. We discussed clinical care both before and after the interview. I have reviewed the patient's clinical course by review of records including previous notes, labs, and vital signs.     Per nursing, the patient had the following behavioral events over the last 24-hours: none    Patient is sitting in her room, tearful upon my arrival.  She just had her preliminary court hearing and reports it went \"fine I guess\".  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  Patient is able to keep discussion linear and logical, denies suicidal or homicidal ideation or hallucination.  She reports high anxiety and does not think the hydroxyzine works very well.  She agrees to trial gabapentin prn for anxiety or restlessness.    MEDICATIONS   Scheduled Meds:    clotrimazole   Topical BID     lithium ER  750 mg Oral At Bedtime     LORazepam  2 mg Oral TID     lurasidone  40 mg Oral Daily with supper     metFORMIN  250 mg Oral BID w/meals     PRN Meds:.acetaminophen, alum & mag hydroxide-simethicone, hydrOXYzine, melatonin, senna-docusate     ALLERGIES   Allergies   Allergen Reactions     Penicillins Unknown        MENTAL STATUS EXAM   Vitals: /71   Pulse 78   Temp 97  F (36.1  C) (Temporal)   Resp 14   Ht 1.626 m (5' 4\")   Wt 86.7 kg (191 lb 1.6 oz)   LMP  (LMP Unknown)   SpO2 95%   BMI 32.80 kg/m      Appearance: Alert, oriented, dressed in hospital scrubs  Attitude: Cooperative  Speech: normal rate/rhythm   Mood: anxiety, sad, embarassed  Affect: Restricted range of affect.  Very tearful at times  Psychomotor Behavior: No tremor, rigidity, No akathisia, or psychomotor retardation    Thought Process: Logical, " linear,  Associations: No loose associations   Thought Content: Denies SI, HI  No psychotic or delusional thought noted.   Insight: Fair   Judgment: Fair  Oriented to: Person, place, and time  Attention Span and Concentration: Intact  Recent and Remote Memory: Intact  Language: English with appropriate syntax and vocabulary  Fund of Knowledge: Average  Muscle Strength and Tone: Grossly normal  Gait and Station: Grossly normal       LABS   No results found for this or any previous visit (from the past 24 hour(s)).      IMPRESSION   This is a 37 year old female with a PMH of major depression and methamphetamine and alcohol use disorder previously admitted on this unit 8/2022 status post suicide attempt currently presenting with concerns for psychosis and catatonia with episodes in which the patient states she can hear, but cannot respond that started on 11/27/22. Patient was started on Clonidine and Risperdal 2 days prior to the onset. Will hold as they may be contributing factors. Patient notes worsening auditory hallucinations the past 2 months. Patient presents mute and uncooperative at times, question if volitional, certainly warrants continued monitoring. Unsure if patient has been abusing substances, she is prescribed Adderall. Given psychosis, will stop. Will further address medications after reassessing tomorrow. If patient does not clear and is agreeable, would reccommend first episode work-up.        DIAGNOSES   1. 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 Bipolar type 1 most recent mixed vs depressed with catatonia  3. Amphetamine use disorder, moderate     PLAN     Location: Unit 5   Legal Status: Court Hold  Her Preliminary Hearing is scheduled 12/09/2022 @ 10:30 am. Her commitment hearing is scheduled for 12/19/2022 @ 10:30 am.    Safety Assessment:    Behavioral Orders   Procedures     Code 1 - Restrict to Unit     Routine Programming     As clinically  indicated     Status 15     Every 15 minutes.      PTA medications continued/changed:     -Did not continue Risperdal due to worsened symptoms when initiated while at the [facility]    New medications tried and stopped:     -None    New medications initiated:     Ativan to target catatonia.  When Ativan was decreased from 2 mg 3 times a day to 1.5 mg 3 times a day she did experience decompensation.  Likely titrate slowly and watch mood close.    -Continue Ativan 2 mg 3 times a day with plan to titrate once lithium level is therapeutic  -Possibly increase Latuda in near future to watch for akathisia as she did experience some in the past and the dose was decreased  -Continue lithium 750 mg QHS   -TSH BMP CMP and Lithium level ordered for 12/10/2022  -Continue Metformin 250 mg BID - increase as tolerated    Today's Changes:   - adding gabapentin 100mg tid prn for anxiety/restlessness    Programming: Patient will be treated in a therapeutic milieu with appropriate individual and group therapies. Education will be provided on diagnoses, medications, and treatments.     Medical diagnoses:  Per medicine    Consult: None    Labs: first episode work up ordered    Anticipated LOS: 3-5 days  Disposition: likely home with family       ATTESTATION      Mable Paris NP

## 2022-12-10 LAB
ANION GAP SERPL CALCULATED.3IONS-SCNC: 10 MMOL/L (ref 7–15)
BUN SERPL-MCNC: 12.5 MG/DL (ref 6–20)
CALCIUM SERPL-MCNC: 8.8 MG/DL (ref 8.6–10)
CHLORIDE SERPL-SCNC: 103 MMOL/L (ref 98–107)
CREAT SERPL-MCNC: 0.89 MG/DL (ref 0.51–0.95)
DEPRECATED HCO3 PLAS-SCNC: 25 MMOL/L (ref 22–29)
GFR SERPL CREATININE-BSD FRML MDRD: 86 ML/MIN/1.73M2
GLUCOSE SERPL-MCNC: 95 MG/DL (ref 70–99)
LITHIUM SERPL-SCNC: 0.5 MMOL/L (ref 0.6–1.2)
POTASSIUM SERPL-SCNC: 4.5 MMOL/L (ref 3.4–5.3)
SODIUM SERPL-SCNC: 138 MMOL/L (ref 136–145)
TSH SERPL DL<=0.005 MIU/L-ACNC: 2.28 UIU/ML (ref 0.3–4.2)

## 2022-12-10 PROCEDURE — 84443 ASSAY THYROID STIM HORMONE: CPT | Performed by: NURSE PRACTITIONER

## 2022-12-10 PROCEDURE — 80178 ASSAY OF LITHIUM: CPT | Performed by: NURSE PRACTITIONER

## 2022-12-10 PROCEDURE — 250N000013 HC RX MED GY IP 250 OP 250 PS 637: Performed by: NURSE PRACTITIONER

## 2022-12-10 PROCEDURE — 80048 BASIC METABOLIC PNL TOTAL CA: CPT | Performed by: NURSE PRACTITIONER

## 2022-12-10 PROCEDURE — 36415 COLL VENOUS BLD VENIPUNCTURE: CPT | Performed by: NURSE PRACTITIONER

## 2022-12-10 PROCEDURE — 99233 SBSQ HOSP IP/OBS HIGH 50: CPT | Performed by: NURSE PRACTITIONER

## 2022-12-10 PROCEDURE — 124N000001 HC R&B MH

## 2022-12-10 RX ORDER — LORAZEPAM 1 MG/1
1 TABLET ORAL 3 TIMES DAILY
Status: DISCONTINUED | OUTPATIENT
Start: 2022-12-10 | End: 2022-12-11

## 2022-12-10 RX ORDER — LITHIUM CARBONATE 450 MG
900 TABLET, EXTENDED RELEASE ORAL AT BEDTIME
Status: DISCONTINUED | OUTPATIENT
Start: 2022-12-10 | End: 2022-12-16

## 2022-12-10 RX ORDER — HYDROXYZINE HYDROCHLORIDE 25 MG/1
50 TABLET, FILM COATED ORAL EVERY 4 HOURS PRN
Status: DISCONTINUED | OUTPATIENT
Start: 2022-12-10 | End: 2022-12-19 | Stop reason: HOSPADM

## 2022-12-10 RX ORDER — GABAPENTIN 300 MG/1
300 CAPSULE ORAL 3 TIMES DAILY
Status: DISCONTINUED | OUTPATIENT
Start: 2022-12-10 | End: 2022-12-11

## 2022-12-10 RX ADMIN — ALUMINUM HYDROXIDE, MAGNESIUM HYDROXIDE, AND SIMETHICONE 30 ML: 200; 200; 20 SUSPENSION ORAL at 17:55

## 2022-12-10 RX ADMIN — METFORMIN HYDROCHLORIDE 250 MG: 500 TABLET ORAL at 17:19

## 2022-12-10 RX ADMIN — GABAPENTIN 300 MG: 300 CAPSULE ORAL at 20:32

## 2022-12-10 RX ADMIN — CLOTRIMAZOLE: 1 CREAM TOPICAL at 10:08

## 2022-12-10 RX ADMIN — LORAZEPAM 1 MG: 1 TABLET ORAL at 20:32

## 2022-12-10 RX ADMIN — HYDROXYZINE HYDROCHLORIDE 50 MG: 25 TABLET ORAL at 20:33

## 2022-12-10 RX ADMIN — HYDROXYZINE HYDROCHLORIDE 50 MG: 25 TABLET ORAL at 12:19

## 2022-12-10 RX ADMIN — LURASIDONE HYDROCHLORIDE 40 MG: 40 TABLET, FILM COATED ORAL at 17:19

## 2022-12-10 RX ADMIN — GABAPENTIN 300 MG: 300 CAPSULE ORAL at 13:56

## 2022-12-10 RX ADMIN — LORAZEPAM 2 MG: 1 TABLET ORAL at 06:43

## 2022-12-10 RX ADMIN — MELATONIN 3 MG: 3 TAB ORAL at 20:33

## 2022-12-10 RX ADMIN — METFORMIN HYDROCHLORIDE 250 MG: 500 TABLET ORAL at 08:07

## 2022-12-10 RX ADMIN — LITHIUM CARBONATE 900 MG: 450 TABLET, EXTENDED RELEASE ORAL at 20:32

## 2022-12-10 RX ADMIN — LORAZEPAM 2 MG: 1 TABLET ORAL at 11:40

## 2022-12-10 ASSESSMENT — ACTIVITIES OF DAILY LIVING (ADL)
ORAL_HYGIENE: INDEPENDENT
ADLS_ACUITY_SCORE: 28
DRESS: INDEPENDENT;SCRUBS (BEHAVIORAL HEALTH)
HYGIENE/GROOMING: INDEPENDENT
ADLS_ACUITY_SCORE: 28
LAUNDRY: UNABLE TO COMPLETE

## 2022-12-10 NOTE — PLAN OF CARE
Problem: Psychotic Signs/Symptoms  Goal: Improved Behavioral Control (Psychotic Signs/Symptoms)  Description: Patient will deny hallucinations by discharge.   Patient will remain in control of behaviors towards staff and peers during hospitalization.   Outcome: Progressing     Problem: Suicidal Behavior  Goal: Suicidal Behavior is Absent or Managed  Description: Patient will deny SI by discharge.   Patient will remain free from self harm/injury during hospitalization.   Patient will verbalize 3 coping skills by discharge.   Outcome: Progressing     Problem: Adult Behavioral Health Plan of Care  Goal: Patient-Specific Goal (Individualization)  Description: Patient will be compliant with treatment team recommendations and medication administrations during hospitalization.  Patient will remain independent with ADLs daily.   Patient will sleep 6-8 hours per night.  Patient will eat at least 50% of meals daily.   Outcome: Progressing     Face to Face report received from MINNIE Davila.  Rounding completed. Patient observed in the open lounge socializing with peers at the start of day shift.  Patient is calm, cooperative & medication compliant.  Patient requested and received Atarax 50 mg at 1219 for anxiety due to a screaming peer in the [facility].  Patient attended groups.  Patient denies SI, HI or pain.    Face to face end of shift report communicated to oncoming evening shift RN.     Aga Henao RN  12/10/2022  2:26 PM

## 2022-12-10 NOTE — PROGRESS NOTES
"[facility] PSYCHIATRY  PROGRESS NOTE     SUBJECTIVE   Prior to interviewing the patient, I met with nursing and reviewed patient's clinical condition. We discussed clinical care both before and after the interview. I have reviewed the patient's clinical course by review of records including previous notes, labs, and vital signs.     Per nursing, the patient had the following behavioral events over the last 24-hours: none    Patient in her room.  She reports feeling better today about \"everything\", mainly court and commitment stuff.  She denies si/hi or hallucination.  We discuss medications as she tried gabapentin prn yesterday for anxiety and states \"it helped a little\".  We agree to schedule this.  She also agrees to increase lithium will increase prn hydroxyzine to 50mg.    MEDICATIONS   Scheduled Meds:    clotrimazole   Topical BID     gabapentin  300 mg Oral TID     lithium ER  900 mg Oral At Bedtime     LORazepam  1 mg Oral TID     lurasidone  40 mg Oral Daily with supper     metFORMIN  250 mg Oral BID w/meals     PRN Meds:.acetaminophen, alum & mag hydroxide-simethicone, hydrOXYzine, melatonin, senna-docusate     ALLERGIES   Allergies   Allergen Reactions     Penicillins Unknown        MENTAL STATUS EXAM   Vitals: /73   Pulse 83   Temp 98.1  F (36.7  C) (Temporal)   Resp 14   Ht 1.626 m (5' 4\")   Wt 86.7 kg (191 lb 1.6 oz)   LMP  (LMP Unknown)   SpO2 97%   BMI 32.80 kg/m      Appearance: Alert, oriented, dressed in hospital scrubs  Attitude: Cooperative  Speech: normal rate/rhythm   Mood: \"I feel better\"  Affect:  Full range  Psychomotor Behavior: No tremor, rigidity, No akathisia, or psychomotor retardation    Thought Process: Logical, linear,  Associations: No loose associations   Thought Content: Denies SI, HI  No psychotic or delusional thought noted.   Insight: Fair   Judgment: Fair  Oriented to: Person, place, and time  Attention Span and Concentration: Intact  Recent and Remote " Memory: Intact  Language: English with appropriate syntax and vocabulary  Fund of Knowledge: Average  Muscle Strength and Tone: Grossly normal  Gait and Station: Grossly normal       LABS   Recent Results (from the past 24 hour(s))   TSH with free T4 reflex    Collection Time: 12/10/22  6:24 AM   Result Value Ref Range    TSH 2.28 0.30 - 4.20 uIU/mL   Basic metabolic panel    Collection Time: 12/10/22  6:24 AM   Result Value Ref Range    Sodium 138 136 - 145 mmol/L    Potassium 4.5 3.4 - 5.3 mmol/L    Chloride 103 98 - 107 mmol/L    Carbon Dioxide (CO2) 25 22 - 29 mmol/L    Anion Gap 10 7 - 15 mmol/L    Urea Nitrogen 12.5 6.0 - 20.0 mg/dL    Creatinine 0.89 0.51 - 0.95 mg/dL    Calcium 8.8 8.6 - 10.0 mg/dL    Glucose 95 70 - 99 mg/dL    GFR Estimate 86 >60 mL/min/1.73m2   Lithium level    Collection Time: 12/10/22  6:24 AM   Result Value Ref Range    Lithium 0.5 (L) 0.6 - 1.2 mmol/L         IMPRESSION   This is a 37 year old female with a PMH of major depression and methamphetamine and alcohol use disorder previously admitted on this unit 8/2022 status post suicide attempt currently presenting with concerns for psychosis and catatonia with episodes in which the patient states she can hear, but cannot respond that started on 11/27/22. Patient was started on Clonidine and Risperdal 2 days prior to the onset. Will hold as they may be contributing factors. Patient notes worsening auditory hallucinations the past 2 months. Patient presents mute and uncooperative at times, question if volitional, certainly warrants continued monitoring.     Today:  Patient improving, adjusting medications - next court date 12/19        DIAGNOSES   1. 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 Bipolar type 1 most recent mixed vs depressed with catatonia  3. Amphetamine use disorder, moderate     PLAN     Location: Unit 5   Legal Status: Court Hold  Her Preliminary Hearing is scheduled  12/09/2022 @ 10:30 am. Her commitment hearing is scheduled for 12/19/2022 @ 10:30 am.    Safety Assessment:    Behavioral Orders   Procedures     Code 1 - Restrict to Unit     Routine Programming     As clinically indicated     Status 15     Every 15 minutes.      PTA medications continued/changed:     -Did not continue Risperdal due to worsened symptoms when initiated while at the [facility]    New medications tried and stopped:     -None    New medications initiated:     -Continue Ativan 2 mg 3 times a day - decreased to 1mg tid 12/10  -Possibly increase Latuda in near future to watch for akathisia as she did experience some in the past and the dose was decreased  -Continue lithium 750 mg QHS - increased to 900mg 12/10  -TSH BMP CMP (WNL) and Lithium level 0.5 on 12/10/2022  -Continue Metformin 250 mg BID - increase as tolerated    Today's Changes:   - Schedule gabapentin 300mg tid   - Increase lithium to 900mg at hs    Programming: Patient will be treated in a therapeutic milieu with appropriate individual and group therapies. Education will be provided on diagnoses, medications, and treatments.     Medical diagnoses:  Per medicine    Consult: None    Labs: first episode work up ordered    Anticipated LOS: 3-5 days  Disposition: likely home with family       ATTESTATION      Mable Paris NP

## 2022-12-10 NOTE — PLAN OF CARE
"  Problem: Adult Behavioral Health Plan of Care  Goal: Patient-Specific Goal (Individualization)  Description: Patient will be compliant with treatment team recommendations and medication administrations during hospitalization.  Patient will remain independent with ADLs daily.   Patient will sleep 6-8 hours per night.  Patient will eat at least 50% of meals daily.   Outcome: Progressing  Note: Report received from Aga HARTMAN. Rounding complete.  Patient is in bed and awake at start of shift.   Patient is withdrawn this shift.  She reports anxiety and wanting to \"just sleep\" today. Patient became tearful stating she's not sure how she's going to get her life back. She is withdrawn to her room much of the shift.  No noted self harm this shift. Cooperative with medications.  Polite during interactions with staff and peers.    1555:  Patient requested and received PRN gabapentin 100 mg po and hydroxyzine 25 mg po for anxiety.   2024:  Patient requested and received PRN melatonin 3 mg po for sleep and hydroxyzine 25 mg po for anxiety.       Problem: Suicidal Behavior  Goal: Suicidal Behavior is Absent or Managed  Description: Patient will deny SI by discharge.   Patient will remain free from self harm/injury during hospitalization.   Patient will verbalize 3 coping skills by discharge.   Outcome: Progressing  Note: Patient had no noted self harm this shift.     Problem: Psychotic Signs/Symptoms  Goal: Improved Behavioral Control (Psychotic Signs/Symptoms)  Description: Patient will deny hallucinations by discharge.   Patient will remain in control of behaviors towards staff and peers during hospitalization.   Outcome: Progressing   Goal Outcome Evaluation:                        "

## 2022-12-10 NOTE — PLAN OF CARE
Problem: Adult Behavioral Health Plan of Care  Goal: Patient-Specific Goal (Individualization)  Description: Patient will be compliant with treatment team recommendations and medication administrations during hospitalization.  Patient will remain independent with ADLs daily.   Patient will sleep 6-8 hours per night.  Patient will eat at least 50% of meals daily.   Outcome: Progressing  Note: Report received from Aga HARTMAN.  ROunding complete.  Patient observed in the lounge coloring at start of shift.   Patient appears less anxious today compared to yesterday afternoon.  Her affect is blunted.  Cooperative with medications and assessment.  Patient requesting to speak with provider tomorrow for a possible increase in metformin.  She states she was started at a low dose to ensure she did not have stomach upset.  She denies stomach upset.    Patient was in the lounge coloring and socializing with peer prior to dinner.  After dinner patient took a nap.    1755:  Patient requested and received PRN maalox for c/o acid indigestion.   2032:   Patient requested and received PRN melatonin 3 mg po for sleep and hydroxyzine 50 mg po for anxiety,.        Problem: Suicidal Behavior  Goal: Suicidal Behavior is Absent or Managed  Description: Patient will deny SI by discharge.   Patient will remain free from self harm/injury during hospitalization.   Patient will verbalize 3 coping skills by discharge.   Outcome: Progressing  Note: Patient had no noted self harm this shift.       Problem: Psychotic Signs/Symptoms  Goal: Improved Behavioral Control (Psychotic Signs/Symptoms)  Description: Patient will deny hallucinations by discharge.   Patient will remain in control of behaviors towards staff and peers during hospitalization.   Outcome: Progressing   Goal Outcome Evaluation:

## 2022-12-10 NOTE — PLAN OF CARE
Problem: Adult Behavioral Health Plan of Care  Goal: Patient-Specific Goal (Individualization)  Description: Patient will be compliant with treatment team recommendations and medication administrations during hospitalization.  Patient will remain independent with ADLs daily.   Patient will sleep 6-8 hours per night.  Patient will eat at least 50% of meals daily.   Outcome: Progressing   Goal Outcome Evaluation:       Face to face shift report received from RN. Rounding completed, pt observed.Client rested in room for 7 hours with eyes closed and respirations noted. Face to face report will be communicated to oncoming RN.    Tim Dubose RN  12/10/2022  6:11 AM

## 2022-12-11 PROCEDURE — 124N000001 HC R&B MH

## 2022-12-11 PROCEDURE — 250N000013 HC RX MED GY IP 250 OP 250 PS 637: Performed by: NURSE PRACTITIONER

## 2022-12-11 PROCEDURE — 99232 SBSQ HOSP IP/OBS MODERATE 35: CPT | Performed by: NURSE PRACTITIONER

## 2022-12-11 RX ORDER — LORAZEPAM 1 MG/1
1 TABLET ORAL 2 TIMES DAILY
Status: DISCONTINUED | OUTPATIENT
Start: 2022-12-11 | End: 2022-12-13 | Stop reason: DRUGHIGH

## 2022-12-11 RX ORDER — LORAZEPAM 1 MG/1
2 TABLET ORAL AT BEDTIME
Status: DISCONTINUED | OUTPATIENT
Start: 2022-12-11 | End: 2022-12-13 | Stop reason: DRUGHIGH

## 2022-12-11 RX ORDER — DIPHENHYDRAMINE HCL 50 MG
50 CAPSULE ORAL EVERY 6 HOURS PRN
Status: DISCONTINUED | OUTPATIENT
Start: 2022-12-11 | End: 2022-12-19 | Stop reason: HOSPADM

## 2022-12-11 RX ORDER — METFORMIN HCL 500 MG
500 TABLET, EXTENDED RELEASE 24 HR ORAL
Status: DISCONTINUED | OUTPATIENT
Start: 2022-12-11 | End: 2022-12-18

## 2022-12-11 RX ADMIN — GABAPENTIN 300 MG: 300 CAPSULE ORAL at 08:57

## 2022-12-11 RX ADMIN — METFORMIN HYDROCHLORIDE 500 MG: 500 TABLET, EXTENDED RELEASE ORAL at 16:39

## 2022-12-11 RX ADMIN — LORAZEPAM 1 MG: 1 TABLET ORAL at 06:56

## 2022-12-11 RX ADMIN — HYDROXYZINE HYDROCHLORIDE 50 MG: 25 TABLET ORAL at 16:39

## 2022-12-11 RX ADMIN — MELATONIN 3 MG: 3 TAB ORAL at 20:57

## 2022-12-11 RX ADMIN — LITHIUM CARBONATE 900 MG: 450 TABLET, EXTENDED RELEASE ORAL at 20:58

## 2022-12-11 RX ADMIN — LORAZEPAM 2 MG: 1 TABLET ORAL at 20:57

## 2022-12-11 RX ADMIN — LURASIDONE HYDROCHLORIDE 40 MG: 40 TABLET, FILM COATED ORAL at 16:39

## 2022-12-11 RX ADMIN — LORAZEPAM 1 MG: 1 TABLET ORAL at 13:58

## 2022-12-11 RX ADMIN — CLOTRIMAZOLE: 1 CREAM TOPICAL at 08:56

## 2022-12-11 RX ADMIN — METFORMIN HYDROCHLORIDE 250 MG: 500 TABLET ORAL at 07:41

## 2022-12-11 RX ADMIN — HYDROXYZINE HYDROCHLORIDE 50 MG: 25 TABLET ORAL at 09:21

## 2022-12-11 ASSESSMENT — ACTIVITIES OF DAILY LIVING (ADL)
ADLS_ACUITY_SCORE: 28

## 2022-12-11 NOTE — PLAN OF CARE
Problem: Psychotic Signs/Symptoms  Goal: Improved Behavioral Control (Psychotic Signs/Symptoms)  Description: Patient will deny hallucinations by discharge.   Patient will remain in control of behaviors towards staff and peers during hospitalization.   Outcome: Progressing     Problem: Suicidal Behavior  Goal: Suicidal Behavior is Absent or Managed  Description: Patient will deny SI by discharge.   Patient will remain free from self harm/injury during hospitalization.   Patient will verbalize 3 coping skills by discharge.   Outcome: Progressing     Problem: Adult Behavioral Health Plan of Care  Goal: Patient-Specific Goal (Individualization)  Description: Patient will be compliant with treatment team recommendations and medication administrations during hospitalization.  Patient will remain independent with ADLs daily.   Patient will sleep 6-8 hours per night.  Patient will eat at least 50% of meals daily.   Outcome: Progressing     Face to Face report received from MINNIE Davila.  Rounding completed. Patient observed in open lounge at the start of day shift coloring and socializing with peers.  Patient denies pain, SI or HI.  She is calm, cooperative & medication compliant.      1100 Patient reports she has a fine rash that is not itchy or painful on her abdomen & upper thighs.  Mable RIOS NP, is here at the time & assesses the patient.  It is decided that the rash is likely due to the new introduction of Gabapentin.  Patient is prescribed Benedryl but could not be given due to timeframe of other medications already given.     Patient too emotional today for groups.    1400 Patient is tearful about weight gain.  Scheduled 1 mg Ativan is given.    Face to face end of shift report communicated to oncoming evening shift RN.     Aga Henao RN  12/11/2022  7:56 AM

## 2022-12-11 NOTE — PLAN OF CARE
Face to face end of shift report received from Meli CONTRERAS RN. Rounding completed and patient observed in her room awake. No requests at this time.     Goal Outcome Evaluation: Patient has appeared calm and cooperative. She isolated to her room but also spent time with her roommate. She denied pain. She endorsed high anxiety at 16:39 and requested 50mg Atarax. Patient did not attend groups. She denied pain. She endorsed some depression but denied HI/SI and hallucinations at this time. She is clean and neatly dressed.  She slept much of the shift.    Face to face end of shift report communicated to oncbeverley RN.       Problem: Adult Behavioral Health Plan of Care  Goal: Patient-Specific Goal (Individualization)  Description: Patient will be compliant with treatment team recommendations and medication administrations during hospitalization.  Patient will remain independent with ADLs daily.   Patient will sleep 6-8 hours per night.  Patient will eat at least 50% of meals daily.   Outcome: Progressing     Problem: Suicidal Behavior  Goal: Suicidal Behavior is Absent or Managed  Description: Patient will deny SI by discharge.   Patient will remain free from self harm/injury during hospitalization.   Patient will verbalize 3 coping skills by discharge.   Outcome: Progressing     Problem: Psychotic Signs/Symptoms  Goal: Improved Behavioral Control (Psychotic Signs/Symptoms)  Description: Patient will deny hallucinations by discharge.   Patient will remain in control of behaviors towards staff and peers during hospitalization.   Outcome: Progressing

## 2022-12-11 NOTE — PROGRESS NOTES
"[facility] PSYCHIATRY  PROGRESS NOTE     SUBJECTIVE   Prior to interviewing the patient, I met with nursing and reviewed patient's clinical condition. We discussed clinical care both before and after the interview. I have reviewed the patient's clinical course by review of records including previous notes, labs, and vital signs.     Per nursing, the patient had the following behavioral events over the last 24-hours: none    Patient is in her room, shows a rash she just noticed coming out of the shower, covers trunk and legs, some on arms.  Most recent new medication started was gabapentin which is the likely culprit, will discontinue this today.  She reports otherwise just feeling \"off\" today, becomes tearful, talks about gaining weight here and overall mood is \"can't control emotions\".  She denies si/hi or hallucination.  Patient asks about her Adderall that she is prescribed and feels like this would be helpful to have again, she denies ever abusing it.  She admits to having problems with meth in the past and reports she is now 64 days sober.  We agree to hold off on any other changes and deal with the rash, likely allergic reaction to gabapentin.      MEDICATIONS   Scheduled Meds:    clotrimazole   Topical BID     lithium ER  900 mg Oral At Bedtime     LORazepam  1 mg Oral BID     LORazepam  2 mg Oral At Bedtime     lurasidone  40 mg Oral Daily with supper     metFORMIN  250 mg Oral BID w/meals     PRN Meds:.acetaminophen, alum & mag hydroxide-simethicone, diphenhydrAMINE, hydrOXYzine, melatonin, senna-docusate     ALLERGIES   Allergies   Allergen Reactions     Penicillins Unknown        MENTAL STATUS EXAM   Vitals: /81   Pulse 92   Temp 99.6  F (37.6  C) (Temporal)   Resp 16   Ht 1.626 m (5' 4\")   Wt 90.2 kg (198 lb 12.8 oz)   LMP  (LMP Unknown)   SpO2 98%   BMI 34.12 kg/m      Appearance: Alert, oriented, dressed in hospital scrubs  Attitude: Cooperative  Speech: normal rate/rhythm   Mood: \"I " "feel off today\"  Affect:  tearful  Psychomotor Behavior: No tremor, rigidity, No akathisia, or psychomotor retardation    Thought Process: Logical, linear,  Associations: No loose associations   Thought Content: Denies SI, HI  No psychotic or delusional thought noted.   Insight: Fair   Judgment: Fair  Oriented to: Person, place, and time  Attention Span and Concentration: Intact  Recent and Remote Memory: Intact  Language: English with appropriate syntax and vocabulary  Fund of Knowledge: Average  Muscle Strength and Tone: Grossly normal  Gait and Station: Grossly normal       LABS   No results found for this or any previous visit (from the past 24 hour(s)).      IMPRESSION   This is a 37 year old female with a PMH of major depression and methamphetamine and alcohol use disorder previously admitted on this unit 8/2022 status post suicide attempt currently presenting with concerns for psychosis and catatonia with episodes in which the patient states she can hear, but cannot respond that started on 11/27/22. Patient was started on Clonidine and Risperdal 2 days prior to the onset. Will hold as they may be contributing factors. Patient notes worsening auditory hallucinations the past 2 months. Patient presents mute and uncooperative at times, question if volitional, certainly warrants continued monitoring.     Today:  Patient developed allergic reaction to gabapentin likely - discontinued and monitor rash.  Next court date 12/19        DIAGNOSES   1. 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 Bipolar type 1 most recent mixed vs depressed with catatonia  3. Amphetamine use disorder, moderate     PLAN     Location: Unit 5   Legal Status: Court Hold  Her Preliminary Hearing is scheduled 12/09/2022 @ 10:30 am. Her commitment hearing is scheduled for 12/19/2022 @ 10:30 am.    Safety Assessment:    Behavioral Orders   Procedures     Code 1 - Restrict to Unit     Routine " Programming     As clinically indicated     Status 15     Every 15 minutes.      PTA medications continued/changed:     -Did not continue Risperdal due to worsened symptoms when initiated while at the [facility]    New medications tried and stopped:     -None    New medications initiated:     -Continue Ativan 2 mg 3 times a day - decreased to 1mg bid 12/10 and 2mg at hs  -Possibly increase Latuda in near future to watch for akathisia as she did experience some in the past and the dose was decreased  -Continue lithium 750 mg QHS - increased to 900mg 12/10  -TSH BMP CMP (WNL) and Lithium level 0.5 on 12/10/2022  -Continue Metformin 250 mg BID - increase as tolerated    Today's Changes:   - gabapentin 300mg tid - discontinued due to allergic reaction, rash      Programming: Patient will be treated in a therapeutic milieu with appropriate individual and group therapies. Education will be provided on diagnoses, medications, and treatments.     Medical diagnoses:  Per medicine    Consult: None    Labs: first episode work up ordered    Anticipated LOS: tbd  Disposition: likely home with family       ATTESTATION      Mable Paris NP

## 2022-12-11 NOTE — PLAN OF CARE
Problem: Adult Behavioral Health Plan of Care  Goal: Patient-Specific Goal (Individualization)  Description: Patient will be compliant with treatment team recommendations and medication administrations during hospitalization.  Patient will remain independent with ADLs daily.   Patient will sleep 6-8 hours per night.  Patient will eat at least 50% of meals daily.   Outcome: Progressing   Goal Outcome Evaluation:       Face to face shift report received from RN. Rounding completed, pt observed. Client rested in room for 4.5 hours with eyes closed and respirations noted.Face to face report will be communicated to oncoming RN.    Tim Dubose RN  12/11/2022  6:46 AM

## 2022-12-12 PROCEDURE — 124N000001 HC R&B MH

## 2022-12-12 PROCEDURE — 250N000013 HC RX MED GY IP 250 OP 250 PS 637: Performed by: NURSE PRACTITIONER

## 2022-12-12 PROCEDURE — 99233 SBSQ HOSP IP/OBS HIGH 50: CPT | Performed by: NURSE PRACTITIONER

## 2022-12-12 RX ADMIN — HYDROXYZINE HYDROCHLORIDE 50 MG: 25 TABLET ORAL at 19:57

## 2022-12-12 RX ADMIN — LURASIDONE HYDROCHLORIDE 40 MG: 40 TABLET, FILM COATED ORAL at 17:06

## 2022-12-12 RX ADMIN — LORAZEPAM 2 MG: 1 TABLET ORAL at 21:41

## 2022-12-12 RX ADMIN — DIPHENHYDRAMINE HYDROCHLORIDE 50 MG: 50 CAPSULE ORAL at 21:45

## 2022-12-12 RX ADMIN — LORAZEPAM 1 MG: 1 TABLET ORAL at 14:06

## 2022-12-12 RX ADMIN — LORAZEPAM 1 MG: 1 TABLET ORAL at 08:19

## 2022-12-12 RX ADMIN — CLOTRIMAZOLE: 1 CREAM TOPICAL at 08:18

## 2022-12-12 RX ADMIN — LITHIUM CARBONATE 900 MG: 450 TABLET, EXTENDED RELEASE ORAL at 21:40

## 2022-12-12 RX ADMIN — METFORMIN HYDROCHLORIDE 500 MG: 500 TABLET, EXTENDED RELEASE ORAL at 17:06

## 2022-12-12 ASSESSMENT — ACTIVITIES OF DAILY LIVING (ADL)
ADLS_ACUITY_SCORE: 28

## 2022-12-12 NOTE — PROGRESS NOTES
"[facility] PSYCHIATRY  PROGRESS NOTE     SUBJECTIVE   Prior to interviewing the patient, I met with nursing and reviewed patient's clinical condition. We discussed clinical care both before and after the interview. I have reviewed the patient's clinical course by review of records including previous notes, labs, and vital signs.     Per nursing, the patient had the following behavioral events over the last 24-hours: none    Patient is in her room, reports rash is gone.  She reports mood as \"better\", not as tearful and presents with full range affect.  She denies si/hi or hallucination.  She asks about her next court date, reports feeling more accepting and is hopeful in being able to discharge that same day.  Encourage patient to explore all of the outpatient services she feels she needs as she would need a solid discharge plan prior to court.      MEDICATIONS   Scheduled Meds:    clotrimazole   Topical BID     lithium ER  900 mg Oral At Bedtime     LORazepam  1 mg Oral BID     LORazepam  2 mg Oral At Bedtime     lurasidone  40 mg Oral Daily with supper     metFORMIN  500 mg Oral Daily with supper     PRN Meds:.acetaminophen, alum & mag hydroxide-simethicone, diphenhydrAMINE, hydrOXYzine, melatonin, senna-docusate     ALLERGIES   Allergies   Allergen Reactions     Penicillins Unknown        MENTAL STATUS EXAM   Vitals: /57   Pulse 79   Temp 98.4  F (36.9  C) (Temporal)   Resp 14   Ht 1.626 m (5' 4\")   Wt 90.2 kg (198 lb 12.8 oz)   LMP  (LMP Unknown)   SpO2 98%   BMI 34.12 kg/m      Appearance: Alert, oriented, dressed in hospital scrubs  Attitude: Cooperative  Speech: normal rate/rhythm   Mood: \"better\"  Affect:  Full range  Psychomotor Behavior: No tremor, rigidity, No akathisia, or psychomotor retardation    Thought Process: Logical, linear,  Associations: No loose associations   Thought Content: Denies SI, HI  No psychotic or delusional thought noted.   Insight: Fair   Judgment: Fair  Oriented " to: Person, place, and time  Attention Span and Concentration: Intact  Recent and Remote Memory: Intact  Language: English with appropriate syntax and vocabulary  Fund of Knowledge: Average  Muscle Strength and Tone: Grossly normal  Gait and Station: Grossly normal       LABS   No results found for this or any previous visit (from the past 24 hour(s)).      IMPRESSION   This is a 37 year old female with a PMH of major depression and methamphetamine and alcohol use disorder previously admitted on this unit 8/2022 status post suicide attempt currently presenting with concerns for psychosis and catatonia with episodes in which the patient states she can hear, but cannot respond that started on 11/27/22. Patient was started on Clonidine and Risperdal 2 days prior to the onset. Will hold as they may be contributing factors. Patient notes worsening auditory hallucinations the past 2 months. Patient presents mute and uncooperative at times, question if volitional, certainly warrants continued monitoring.     Today:  Patient with improved mood, attends most group programming, and more accepting of legal process.  Rash gone, will add gabapentin to allergy list.  No evidence of catatonia - ativan taper in process.  Next court date 12/19        DIAGNOSES   1. 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 Bipolar type 1 most recent mixed vs depressed with catatonia  3. Amphetamine use disorder, moderate     TREATMENT TEAM CARE PLAN     Progress: Continued symptoms.    Continued Stay Criteria/Rationale: Continued symptoms without sufficient improvement/resolution.    Medical/Physical: See above.    Precautions: See above.     Plan: Continue inpatient care with unit support and medication management.    Rationale for change in precautions or plan: NA due to no change.    Participants: SUNIL Alvarado CNP, Nursing, SW, OT.    The patient's care was discussed with the  treatment team and chart notes were reviewed.         PLAN     Location: Unit 5   Legal Status: Court Hold  Her Preliminary Hearing is scheduled 12/09/2022 @ 10:30 am. Her commitment hearing is scheduled for 12/19/2022 @ 10:30 am.    Safety Assessment:    Behavioral Orders   Procedures     Code 1 - Restrict to Unit     Routine Programming     As clinically indicated     Status 15     Every 15 minutes.      PTA medications continued/changed:     -Did not continue Risperdal due to worsened symptoms when initiated while at the [facility]    New medications tried and stopped:     -None    New medications initiated:     -Continue Ativan 2 mg 3 times a day - decreased to 1mg bid 12/10 and 2mg at hs  -Possibly increase Latuda in near future to watch for akathisia as she did experience some in the past and the dose was decreased  -Continue lithium 750 mg QHS - increased to 900mg 12/10  -TSH BMP CMP (WNL) and Lithium level 0.5 on 12/10/2022  -Continue Metformin 250 mg BID - increase as tolerated    Today's Changes: None 12/12  - gabapentin 300mg tid - discontinued due to allergic reaction, rash      Programming: Patient will be treated in a therapeutic milieu with appropriate individual and group therapies. Education will be provided on diagnoses, medications, and treatments.     Medical diagnoses:  Per medicine    Consult: None    Labs: first episode work up ordered    Anticipated LOS: tbd  Disposition: likely home with family       ATTESTATION      Mable Paris NP

## 2022-12-12 NOTE — PROGRESS NOTES
"    The SW provided the patient with her Court Examination paper work. The patient asked, \" What is a Stayed Order?\" SW explained that it means if the patient follows through and cooperates with the Treatment Team and [facility]'s plan with services for 6 months it will not go on their record. The patient stated she was okay with that recommendation and will not ask for another examiner.  "

## 2022-12-12 NOTE — DISCHARGE INSTRUCTIONS
Behavioral Discharge Planning and Instructions    Summary: The patient is a 37 year old female that was admitted for SI.     Main Diagnosis: SI    Health Care Follow-up:     [facility]  Appointment: 12/21/2022 @ 3:40 pm with MAYUR Groves for after hospital follow up.   [address], [place], [place] [address] [phone]  Fax: [phone]     Nancy CHRISTYOLyndaj.O  Appointment: 01/18/2023 @ 10:40 am  for Med Management - Shannan Poe - Patient is also on a cancellation call list for a sooner appointment.   Appointment: 12/21/2022 @ 9:00 am for Individual Therapy   Appointment: 01/16/2023 @ 3:00 pm for Individual Therapy   [address] [place] [address]  Phone: [phone]  Fax: [phone]     [facility]   [address] [facility] [address]  Mobile: [phone]  Phone: [phone]   Fax: [phone]     [facility]   [address] [place]  [address]  Phone - [phone]  Fax - [phone]     NUMBERS TO CALL IN CRISIS    National Suicide Prevention Lifeline ([facility])  1-650.785.1718    Crisis Text Line ([place])  Text  HOME  to 804170    Mental Health Crisis Line ([place], [place])  858.973.1311    [facility] Mobile Crisis ([place], [place])   [phone]      If you are feeling very unsafe, call 911 or bring yourself to the Emergency Room        Counseling in [place]:  Andres Ramos           [phone]  [facility]    Camelia Pavon CNP      [phone]  [facility]     ANDREWS Mejia (young kids)    [phone]   ANDREWS Mcduffie (older kids & adults)  [phone]   PAYTON Buchanan      [phone]  [facility]       [phone]   Abhilash Jimenez MS, LP   Lizette Min MA, LPC   Juan Villalobos MS psychotherapist   Karen nOofre MS, LPC   Robina Castanon, P, counselor   Nguyen Jimenez, ANDREW, counselor  David        [phone]  Regis Ceja, david Daniels  Lázaro, psychiatry  Nicol Oneill, counseling  [place], counseling  Myriam Mix, counseling  Denise Gomez, psychiatry   [facility]       [phone]   Savannah Gutierrez MA, LMFT, [facility]   Alicia Vargas MSW, [facility]          [phone]  [facility]      [phone]   Nguyen Thayerbeny MS, [facility]          [phone]        Smiley Diez MSW, [facility] , C-MI   Brianne Mario MSW, [facility]                                                    Braulio Pospeck [facility]      Amber Linares MS, [facility]                [phone]      Ofe Lujan PsyD     Temitope Hernández PsyD, owner      Alyssa Adorno PsyD    Ruth Ann Frey MPAS, KEANU Sotelo PhD   Nighat Pal MSW, LICSW [facility]       [phone]   Merissa Sims [facility]   Pritesh Vidal APRN, CNP,     Mello Christianson MSW, [facility] (adolescents)   Jackie Grinnel APRN, CNP (Hib via telehealth; adolescents)   Rosemarie ESTRADA, CNP (Hib via telehealth)   Heraclio Mccloud MA, LPC, BCIA (Hib via telehealth)   Robyn Caraballo [facility]   Robynstephanie Hinds MSW, [facility]   Lindsay Holils [facility]   Brent Fernández DNP, APRN, CNP   Violeta Zaman RN, BSN   Mago Ortega RN-BC, BSN (Hib via telehealth)  [facility]         [phone]   Shannan Amin, MSN, [facility]           [phone]   Dennis Young MA, LMFT   Mable Dillard MS RN [facility] NP   Nguyen Tabor, [facility]         [phone]  Halina [facility]       [phone]   [facility]   Jolly Jacome (to be [facility])   Dominick Gentile (to be [facility])      Counseling in [place]:  [facility]       [phone]   mental health & CD counseling  Heraclio Grigsby       [phone]  [facility]       [phone]  [facility]        [phone]   Savannah Leon  [facility]       [facility]              [phone]   can do weekends and  evenings   DeLorr Brittany, MSW, LICSW, LCSW, CCTP    Quinton Finnegan MA, LMFT, LICSW  Marion Harish [place]       evenings, weekends  [phone]      Counseling in [place]:  [facility]         [phone]   Shannan Amin, MSN, PMP-BC   Mira More MA, [facility]      [phone]  [facility]       [phone]   Madiha Pavon PMFT  [facility]       Shilpa Sahu       [phone]  [facility]    [address] [facility] [address]     Rafael [facility]      [phone]  [facility]     Hill Mullins MS Ed, [facility]    [phone]    (kids) denotes providers who also see kids     Attend all scheduled appointments with your outpatient providers. Call at least 24 hours in advance if you need to reschedule an appointment to ensure continued access to your outpatient providers.     Major Treatments, Procedures and Findings:  You were provided with: a psychiatric assessment, assessed for medical stability, medication evaluation and/or management, group therapy, family therapy, individual therapy, CD evaluation/assessment, milieu management, and medical interventions    Symptoms to Report: feeling more aggressive, increased confusion, losing more sleep, mood getting worse, or thoughts of suicide    Early warning signs can include: increased depression or anxiety sleep disturbances increased thoughts or behaviors of suicide or self-harm  increased unusual thinking, such as paranoia or hearing voices        Resources:   Crisis Intervention: 684.237.1784 or 205-661-2885 (TTY: 711.494.4578).  Call anytime for help.  National [place] on Mental Illness (www.mn.duc.org): 774.972.5524 or 514-558-3014.  [place] Association for Children's Mental Health (www.macmh.org): 531.169.8935.  Alcoholics Anonymous (www.alcoholics-anonymous.org): Check your phone book for your local chapter.  Suicide Awareness Voices of Education (SAVE) (www.save.org):  "888-511-SAVE (7283)  National Suicide Prevention Line (www.mentalhealthmn.org): 912-852-JVGJ (3100)  Mental Health Consumer/Survivor Network of [place] (www.mhcsn.net): 355.991.3965 or 464-452-3964  Mental Health Association of [place] (www.mentalhealth.org): 440.397.6194 or 641-363-5607  Text 4 Life: txt \"LIFE\" to 84436 for immediate support and crisis intervention  Crisis text line: Text \"[place]\" to 513869. Free, confidential, 24/7.  Crisis Intervention: 201.989.4137 or 421-132-3329. Call anytime for help.     General Medication Instructions:   See your medication sheet(s) for instructions.   Take all medicines as directed.  Make no changes unless your doctor suggests them.   Go to all your doctor visits.  Be sure to have all your required lab tests. This way, your medicines can be refilled on time.  Do not use any drugs not prescribed by your doctor.  Avoid alcohol.    Advance Directives:   Scanned document on file with [employer]? No scanned doc  Is document scanned? Pt states no documents  Honoring Choices Your Rights Handout: Informed and given  Was more information offered? Pt declined    The Treatment team has appreciated the opportunity to work with you. If you have any questions or concerns about your recent admission, you can contact the unit which can receive your call 24 hours a day, 7 days a week. They will be able to get in touch with a Provider if needed. The unit number is [phone] .    [facility] Area:  [facility], [facility] stabilization [facility]- [phone]  [facility] Crisis Line: 1-911.373.3134  Advocates For Family Peace: 920-1017  Sexual Assault Program [facility]: 874.716.9613 or 1-980.604.2193  [place] Battered Women's Program: 2-696-754-4122 Ext: 279       Calls answered Mon-Fri-8:00 am--4:30 pm    [place]:  Advocates for Family Peace: 3-749-932-2291  [facility] - 7-086-876-2194  [facility] first call for help: 2-127-677-1692  [facility] Crisis Center:  (218) " "786-0702    [place] Area:  Warm Line: 1-233.993.4766       Calls answered Tuesday--Saturday 4:00 pm--10:00 pm  [place] Crisis Line - 257.242.6349  [facility] 107-064-0712    [place] Statewide:  [place] Crisis and Referral Services: 1-690.100.8871  National Suicide Prevention Lifeline: 8-287-965-TALK (8910)   - zic6qwug- Text \"Life\" to 78645  First Call for Help: 2-1-1  CHRIS Helpline- 2-689-ZOFO-HELP   Crisis Text Line: Text  [place]  to 657168   "

## 2022-12-12 NOTE — PROGRESS NOTES
During OT group nurse brought in alpha stim and stated that Ilana was going to use the alpha stim during group and left.   OT set patient up with device and confirmed that she didn't have any implanted electronic devices. Patient states that she has used the alpha stim machine prior.   Frequency in HZ:0.5  Time. 20  Current in microamperes: 3.5  Rating of symptoms prior to treatment (indicate symptom I.e pain, insomnia, anxiety or depression): anxiety 5/10  Rating of symptoms after treatment (indicate symptom I.e pain, insomnia, anxiety or depression) : 3/10  Skin was cleaned prior to applying electrodes and assessed prior and post treatment. No metal was in contact with the electrode probes or clips.   Irritation of the skin post treatment was noted: no.

## 2022-12-12 NOTE — PLAN OF CARE
Problem: Adult Behavioral Health Plan of Care  Goal: Patient-Specific Goal (Individualization)  Description: Patient will be compliant with treatment team recommendations and medication administrations during hospitalization.  Patient will remain independent with ADLs daily.   Patient will sleep 6-8 hours per night.  Patient will eat at least 50% of meals daily.   Outcome: Progressing   Goal Outcome Evaluation:       Face to face shift report received from RN. Rounding completed, pt observed.Client rested in room for 6 hours with eyes closed and respirations noted.Face to face report will be communicated to oncoming RN.    Tim Dubose RN  12/12/2022  6:48 AM

## 2022-12-12 NOTE — PROGRESS NOTES
1:1 time with the patient.  No changes made to the discharge plan at this time.   See the AVS for follow up appointments and recommendations.     SW spoke to the patient and provided her with the preliminary Hearing findings court paper work.

## 2022-12-12 NOTE — PLAN OF CARE
Problem: Adult Behavioral Health Plan of Care  Goal: Patient-Specific Goal (Individualization)  Description: Patient will be compliant with treatment team recommendations and medication administrations during hospitalization.  Patient will remain independent with ADLs daily.   Patient will sleep 6-8 hours per night.  Patient will eat at least 50% of meals daily.   Outcome: Progressing     Problem: Suicidal Behavior  Goal: Suicidal Behavior is Absent or Managed  Description: Patient will deny SI by discharge.   Patient will remain free from self harm/injury during hospitalization.   Patient will verbalize 3 coping skills by discharge.   Outcome: Progressing     Problem: Psychotic Signs/Symptoms  Goal: Improved Behavioral Control (Psychotic Signs/Symptoms)  Description: Patient will deny hallucinations by discharge.   Patient will remain in control of behaviors towards staff and peers during hospitalization.   Outcome: Progressing     Face to face shift report received from MINNIE Dumont. Rounding completed, pt observed in lounge.    Patient ate her breakfast in her room & ate 100%. She took morning medications as ordered. Spoke with her about the time change for her ativan and she was ok with it being scheduled at 0900.    Patient requested to cut her toe nails, spent time with patient in her room as she completed this. She feels depression is about that same as it has been, however her anxiety is high today as she received her examiners report. The examiner is recommending a stay. Patient is still anxious about this and stated she just wants to go home. She denies SI/HI, hallucinations & pain.     1020 - Patient in the lounge at the table being social with peers.     1245 - Patient requested to do alpha stim, OT group starting box given to OT for patient to complete during group.     Patient took 1400 medication as ordered.     Face to face report will be communicated to oncoming RN.    Miranda Gomez,  MINNIE  12/12/2022  2:34 PM

## 2022-12-12 NOTE — PLAN OF CARE
Problem: Adult Behavioral Health Plan of Care  Goal: Patient-Specific Goal (Individualization)  Description: Patient will be compliant with treatment team recommendations and medication administrations during hospitalization.  Patient will remain independent with ADLs daily.   Patient will sleep 6-8 hours per night.  Patient will eat at least 50% of meals daily.   Outcome: Progressing  Note: 15:40: Received end of shift report from PAVEL Jean RN. Pt participating in group upon arrival---     21:45: PRN diphenhyramine given for mild itching/sleep per pt request in addition to HS medication administration. Denies pain.     22:27: Pt out et about on unit majority of shift, participates actively in PM groups, linear, logical thought process; relevant. PRN hydroxyzine given for c/o increasing anxiety @ approximately 20:00; Effective relief noted. Overall improved psychological status compared to date of admission. Denies SI/HI, hallucinations.     Face to face end of shift report to be communicated to on-coming [facility] staff.     Victoria Slaughter RN  12/12/2022  10:31 PM                 Problem: Suicidal Behavior  Goal: Suicidal Behavior is Absent or Managed  Description: Patient will deny SI by discharge.   Patient will remain free from self harm/injury during hospitalization.   Patient will verbalize 3 coping skills by discharge.   Outcome: Progressing     Problem: Psychotic Signs/Symptoms  Goal: Improved Behavioral Control (Psychotic Signs/Symptoms)  Description: Patient will deny hallucinations by discharge.   Patient will remain in control of behaviors towards staff and peers during hospitalization.   Outcome: Progressing   Goal Outcome Evaluation:

## 2022-12-13 PROCEDURE — 99233 SBSQ HOSP IP/OBS HIGH 50: CPT | Performed by: NURSE PRACTITIONER

## 2022-12-13 PROCEDURE — 250N000013 HC RX MED GY IP 250 OP 250 PS 637: Performed by: NURSE PRACTITIONER

## 2022-12-13 PROCEDURE — 124N000001 HC R&B MH

## 2022-12-13 RX ORDER — HYDROCORTISONE 25 MG/G
CREAM TOPICAL 2 TIMES DAILY
Status: DISCONTINUED | OUTPATIENT
Start: 2022-12-13 | End: 2022-12-13

## 2022-12-13 RX ORDER — LORAZEPAM 1 MG/1
1 TABLET ORAL 3 TIMES DAILY
Status: DISCONTINUED | OUTPATIENT
Start: 2022-12-13 | End: 2022-12-15

## 2022-12-13 RX ORDER — HYDROCORTISONE 25 MG/G
CREAM TOPICAL 2 TIMES DAILY PRN
Status: DISCONTINUED | OUTPATIENT
Start: 2022-12-13 | End: 2022-12-19 | Stop reason: HOSPADM

## 2022-12-13 RX ADMIN — CLOTRIMAZOLE: 1 CREAM TOPICAL at 08:36

## 2022-12-13 RX ADMIN — LORAZEPAM 1 MG: 1 TABLET ORAL at 08:35

## 2022-12-13 RX ADMIN — HYDROCORTISONE: 25 CREAM TOPICAL at 10:31

## 2022-12-13 RX ADMIN — LITHIUM CARBONATE 900 MG: 450 TABLET, EXTENDED RELEASE ORAL at 21:03

## 2022-12-13 RX ADMIN — LURASIDONE HYDROCHLORIDE 40 MG: 40 TABLET, FILM COATED ORAL at 17:58

## 2022-12-13 RX ADMIN — SALINE NASAL SPRAY 1 SPRAY: 1.5 SOLUTION NASAL at 18:00

## 2022-12-13 RX ADMIN — ACETAMINOPHEN 650 MG: 325 TABLET, FILM COATED ORAL at 14:06

## 2022-12-13 RX ADMIN — SALINE NASAL SPRAY 1 SPRAY: 1.5 SOLUTION NASAL at 14:07

## 2022-12-13 RX ADMIN — LORAZEPAM 1 MG: 1 TABLET ORAL at 21:03

## 2022-12-13 RX ADMIN — METFORMIN HYDROCHLORIDE 500 MG: 500 TABLET, EXTENDED RELEASE ORAL at 17:58

## 2022-12-13 RX ADMIN — HYDROXYZINE HYDROCHLORIDE 50 MG: 25 TABLET ORAL at 17:58

## 2022-12-13 RX ADMIN — SALINE NASAL SPRAY 1 SPRAY: 1.5 SOLUTION NASAL at 10:31

## 2022-12-13 RX ADMIN — HYDROXYZINE HYDROCHLORIDE 50 MG: 25 TABLET ORAL at 10:24

## 2022-12-13 RX ADMIN — LORAZEPAM 1 MG: 1 TABLET ORAL at 14:04

## 2022-12-13 ASSESSMENT — ACTIVITIES OF DAILY LIVING (ADL)
ADLS_ACUITY_SCORE: 28

## 2022-12-13 NOTE — PLAN OF CARE
"Face to face end of shift report communicated to oncoming shift.     Brenda Purvis RN  12/13/2022  3:10 PM    Problem: Adult Behavioral Health Plan of Care  Goal: Patient-Specific Goal (Individualization)  Description: Patient will be compliant with treatment team recommendations and medication administrations during hospitalization.  Patient will remain independent with ADLs daily.   Patient will sleep 6-8 hours per night.  Patient will eat at least 50% of meals daily.   Outcome: Progressing  Note: Patient up on the unit socializing and interacting with peers, playing cards at the table.   Patient denies mental health symptoms, endorses frustration with still being in the hospital \"being here will make me feel crazy\"  Patient expresses irritation regarding her nose and pain and hemorrhoids.  Informed patient this writer would make request.    Patient at nurses window tearful, reports \"I just feel unheard and it's hard being here and my anxiety is really bad\"  PRN: hydroxyzine 50 mg given @ 1024    Patient attends all groups, patient is polite and cooperative with nursing cares and assessment.  Patient lets needs be known.  PRN Ocean nasal spray-for sinus complaints given twice this shift.  PRN  Anusol once for complaints of hemorrhoids.   PRN:  Tylenol 650 mg for headache @ 1406    Patient spends time in the lounge listening to headphones and coloring this afternoon.   Patient reports good sleep, \"but I still feel tired\"    Patient eats 100% of meals.    Goal Outcome Evaluation:                        "

## 2022-12-13 NOTE — PLAN OF CARE
Problem: Adult Behavioral Health Plan of Care  Goal: Patient-Specific Goal (Individualization)  Description: Patient will be compliant with treatment team recommendations and medication administrations during hospitalization.  Patient will remain independent with ADLs daily.   Patient will sleep 6-8 hours per night.  Patient will eat at least 50% of meals daily.   Outcome: Progressing   Goal Outcome Evaluation:       Face to face shift report received from RN. Rounding completed, pt observed. Client rested in room for 7 hours with eyes closed and respirations noted.Face to face report will be communicated to oncoming RN.    Tim Dubose RN  12/13/2022  6:42 AM

## 2022-12-14 LAB
BASOPHILS # BLD AUTO: 0.1 10E3/UL (ref 0–0.2)
BASOPHILS NFR BLD AUTO: 1 %
EOSINOPHIL # BLD AUTO: 0.1 10E3/UL (ref 0–0.7)
EOSINOPHIL NFR BLD AUTO: 1 %
ERYTHROCYTE [DISTWIDTH] IN BLOOD BY AUTOMATED COUNT: 12.8 % (ref 10–15)
HCT VFR BLD AUTO: 39.4 % (ref 35–47)
HGB BLD-MCNC: 12.8 G/DL (ref 11.7–15.7)
HOLD SPECIMEN: NORMAL
IMM GRANULOCYTES # BLD: 0 10E3/UL
IMM GRANULOCYTES NFR BLD: 0 %
LYMPHOCYTES # BLD AUTO: 1.5 10E3/UL (ref 0.8–5.3)
LYMPHOCYTES NFR BLD AUTO: 16 %
MCH RBC QN AUTO: 28.7 PG (ref 26.5–33)
MCHC RBC AUTO-ENTMCNC: 32.5 G/DL (ref 31.5–36.5)
MCV RBC AUTO: 88 FL (ref 78–100)
MONOCYTES # BLD AUTO: 0.8 10E3/UL (ref 0–1.3)
MONOCYTES NFR BLD AUTO: 8 %
NEUTROPHILS # BLD AUTO: 6.8 10E3/UL (ref 1.6–8.3)
NEUTROPHILS NFR BLD AUTO: 74 %
NRBC # BLD AUTO: 0 10E3/UL
NRBC BLD AUTO-RTO: 0 /100
PLATELET # BLD AUTO: 307 10E3/UL (ref 150–450)
RBC # BLD AUTO: 4.46 10E6/UL (ref 3.8–5.2)
WBC # BLD AUTO: 9.2 10E3/UL (ref 4–11)

## 2022-12-14 PROCEDURE — 124N000001 HC R&B MH

## 2022-12-14 PROCEDURE — 250N000013 HC RX MED GY IP 250 OP 250 PS 637: Performed by: NURSE PRACTITIONER

## 2022-12-14 PROCEDURE — 36415 COLL VENOUS BLD VENIPUNCTURE: CPT | Performed by: NURSE PRACTITIONER

## 2022-12-14 PROCEDURE — 85004 AUTOMATED DIFF WBC COUNT: CPT | Performed by: NURSE PRACTITIONER

## 2022-12-14 PROCEDURE — 99231 SBSQ HOSP IP/OBS SF/LOW 25: CPT | Performed by: NURSE PRACTITIONER

## 2022-12-14 RX ORDER — LORATADINE 10 MG/1
10 TABLET ORAL DAILY
Status: DISCONTINUED | OUTPATIENT
Start: 2022-12-14 | End: 2022-12-19 | Stop reason: HOSPADM

## 2022-12-14 RX ORDER — FLUTICASONE PROPIONATE 50 MCG
1 SPRAY, SUSPENSION (ML) NASAL DAILY
Status: DISCONTINUED | OUTPATIENT
Start: 2022-12-14 | End: 2022-12-19 | Stop reason: HOSPADM

## 2022-12-14 RX ADMIN — CLOTRIMAZOLE: 1 CREAM TOPICAL at 08:30

## 2022-12-14 RX ADMIN — LORAZEPAM 1 MG: 1 TABLET ORAL at 20:16

## 2022-12-14 RX ADMIN — HYDROXYZINE HYDROCHLORIDE 50 MG: 25 TABLET ORAL at 19:20

## 2022-12-14 RX ADMIN — METFORMIN HYDROCHLORIDE 500 MG: 500 TABLET, EXTENDED RELEASE ORAL at 16:59

## 2022-12-14 RX ADMIN — HYDROCORTISONE: 25 CREAM TOPICAL at 08:30

## 2022-12-14 RX ADMIN — LORAZEPAM 1 MG: 1 TABLET ORAL at 13:32

## 2022-12-14 RX ADMIN — LURASIDONE HYDROCHLORIDE 40 MG: 40 TABLET, FILM COATED ORAL at 16:59

## 2022-12-14 RX ADMIN — ACETAMINOPHEN 650 MG: 325 TABLET, FILM COATED ORAL at 08:29

## 2022-12-14 RX ADMIN — FLUTICASONE PROPIONATE 1 SPRAY: 50 SPRAY, METERED NASAL at 13:32

## 2022-12-14 RX ADMIN — LORATADINE 10 MG: 10 TABLET ORAL at 13:32

## 2022-12-14 RX ADMIN — LITHIUM CARBONATE 900 MG: 450 TABLET, EXTENDED RELEASE ORAL at 20:16

## 2022-12-14 RX ADMIN — LORAZEPAM 1 MG: 1 TABLET ORAL at 08:29

## 2022-12-14 ASSESSMENT — ACTIVITIES OF DAILY LIVING (ADL)
ADLS_ACUITY_SCORE: 28
HYGIENE/GROOMING: INDEPENDENT
ADLS_ACUITY_SCORE: 28

## 2022-12-14 NOTE — PROGRESS NOTES
"[facility] PSYCHIATRY  PROGRESS NOTE     SUBJECTIVE   Prior to interviewing the patient, I met with nursing and reviewed patient's clinical condition. We discussed clinical care both before and after the interview. I have reviewed the patient's clinical course by review of records including previous notes, labs, and vital signs.     Per nursing, the patient had the following behavioral events over the last 24-hours: none    Patient is in group, agreeable to going to her room to meet. She reports mood as \"better\", not as tearful and presents with full range affect.  She denies SI/HI or hallucinations.  She asks about her next court date, reports feeling more accepting and is hopeful in being able to discharge that same day.  Reports difficulty focusing and states she is looking forward to being discharged, so she can get back on Vyvanse. Given her recent episode of psychosis, I highly discouraged this given a high risk for decompensation. Also, discussed overlapping symptoms of bipolar disorder and ADHD when it comes to inattention outlining the importance of addressing the mood disorder first.      MEDICATIONS   Scheduled Meds:    clotrimazole   Topical BID     lithium ER  900 mg Oral At Bedtime     LORazepam  1 mg Oral TID     lurasidone  40 mg Oral Daily with supper     metFORMIN  500 mg Oral Daily with supper     PRN Meds:.acetaminophen, alum & mag hydroxide-simethicone, diphenhydrAMINE, hydrocortisone (Perianal), hydrOXYzine, melatonin, senna-docusate, sodium chloride     ALLERGIES   Allergies   Allergen Reactions     Penicillins Unknown        MENTAL STATUS EXAM   Vitals: /76   Pulse 89   Temp 98.6  F (37  C) (Tympanic)   Resp 16   Ht 1.626 m (5' 4\")   Wt 90.2 kg (198 lb 12.8 oz)   LMP  (LMP Unknown)   SpO2 98%   BMI 34.12 kg/m      Appearance: Alert, oriented, dressed in hospital scrubs  Attitude: Cooperative  Speech: normal rate/rhythm   Mood: \"better\"  Affect:  Full range  Psychomotor " Behavior: No tremor, rigidity, No akathisia, or psychomotor retardation    Thought Process: Logical, linear,  Associations: No loose associations   Thought Content: Denies SI, HI  No psychotic or delusional thought noted.   Insight: Fair   Judgment: Fair  Oriented to: Person, place, and time  Attention Span and Concentration: Intact  Recent and Remote Memory: Intact  Language: English with appropriate syntax and vocabulary  Fund of Knowledge: Average  Muscle Strength and Tone: Grossly normal  Gait and Station: Grossly normal       LABS   No results found for this or any previous visit (from the past 24 hour(s)).      IMPRESSION   This is a 37 year old female with a PMH of major depression and methamphetamine and alcohol use disorder previously admitted on this unit 8/2022 status post suicide attempt currently presenting with concerns for psychosis and catatonia with episodes in which the patient states she can hear, but cannot respond that started on 11/27/22. Patient was started on Clonidine and Risperdal 2 days prior to the onset. Will hold as they may be contributing factors. Patient notes worsening auditory hallucinations the past 2 months. Patient presents mute and uncooperative at times, question if volitional, certainly warrants continued monitoring.     Today:  Patient with improved mood, attends most group programming, and more accepting of legal process. No evidence of catatonia - ativan taper in process.  Next court date 12/19        DIAGNOSES   1. 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 Bipolar type 1 most recent mixed vs depressed with catatonia  3. Amphetamine use disorder, moderate       PLAN     Location: Unit 5   Legal Status: Court Hold  Her Preliminary Hearing is scheduled 12/09/2022 @ 10:30 am. Her commitment hearing is scheduled for 12/19/2022 @ 10:30 am.    Safety Assessment:    Behavioral Orders   Procedures     Code 1 - Restrict to Unit      Routine Programming     As clinically indicated     Status 15     Every 15 minutes.      PTA medications continued/changed:     -Did not continue Risperdal due to worsened symptoms when initiated while at the [facility]    New medications tried and stopped:     -- gabapentin 300mg tid - discontinued due to allergic reaction, rash    New medications initiated:     -Continue Ativan 2 mg 3 times a day - decreased to 1mg bid 12/10 and 2mg at hs  -Possibly increase Latuda in near future to watch for akathisia as she did experience some in the past and the dose was decreased  -Continue lithium 750 mg QHS - increased to 900mg 12/10  -TSH BMP CMP (WNL) and Lithium level 0.5 on 12/10/2022  -Continue Metformin 250 mg BID - increase as tolerated    Today's Changes:   -Decrease Ativan to 1 mg TID      Programming: Patient will be treated in a therapeutic milieu with appropriate individual and group therapies. Education will be provided on diagnoses, medications, and treatments.     Medical diagnoses:  Per medicine    Consult: None    Labs: Lithium level ordered for 12/15    Anticipated LOS: TBD  Disposition: Likely home with family       ATTESTATION      La Porter NP

## 2022-12-14 NOTE — PLAN OF CARE
Problem: Adult Behavioral Health Plan of Care  Goal: Patient-Specific Goal (Individualization)  Description: Patient will be compliant with treatment team recommendations and medication administrations during hospitalization.  Patient will remain independent with ADLs daily.   Patient will sleep 6-8 hours per night.  Patient will eat at least 50% of meals daily.   Outcome: Progressing  Note: 15:40: Received end of shift report from MINNIE Everett.     19:20: PRN hydroxyzine given for increasing anxiety-- Pt actively  particpating in PM groups, full range/bright affect, mood is calm.     20:20: Compliant with HS medication administration, adequate food et fluid intake. Requesting drops for dry eyes-- sticky note initiated. Denies SI/HI, hallucinations, endorses overall reduction in anxiety et depression.     Face to face end of shift report communicated to on-coming [facility] staff.     Victoria Slaughter RN  12/14/2022  11:20 PM             Problem: Suicidal Behavior  Goal: Suicidal Behavior is Absent or Managed  Description: Patient will deny SI by discharge.   Patient will remain free from self harm/injury during hospitalization.   Patient will verbalize 3 coping skills by discharge.   Outcome: Progressing     Problem: Psychotic Signs/Symptoms  Goal: Improved Behavioral Control (Psychotic Signs/Symptoms)  Description: Patient will deny hallucinations by discharge.   Patient will remain in control of behaviors towards staff and peers during hospitalization.   Outcome: Progressing   Goal Outcome Evaluation:

## 2022-12-14 NOTE — PLAN OF CARE
Problem: Adult Behavioral Health Plan of Care  Goal: Patient-Specific Goal (Individualization)  Description: Patient will be compliant with treatment team recommendations and medication administrations during hospitalization.  Patient will remain independent with ADLs daily.   Patient will sleep 6-8 hours per night.  Patient will eat at least 50% of meals daily.   Outcome: Progressing    Face to face end of shift report received from night shift RN. Rounding completed. Pt observed ambulating in the hallway. Pt requests and receives shower supplies; Pt showers. Pt reports 4/10 headache pain; 0829 Pt requests and receives Tylenol prn as ordered. 0830 Pt requests and receives Hydrocortisone cream prn as ordered. Pt accepts and receives this shift's scheduled medications. Pt attends group. Pt reports right sinus pain; Pt reports chills; Pt reports believing she may have an infection. Pt accepts and tolerates ordered lab blood draw. Patient accepts and tolerates meeting with Lindsay MACIEL. Will continue to support the needs of the patient. Face to face end of shift report to be given to evening shift RN.       Problem: Suicidal Behavior  Goal: Suicidal Behavior is Absent or Managed  Description: Patient will deny SI by discharge.   Patient will remain free from self harm/injury during hospitalization.   Patient will verbalize 3 coping skills by discharge.   Outcome: Progressing Pt remained free of self injury and harm throughout the duration of this shift.       Problem: Psychotic Signs/Symptoms  Goal: Improved Behavioral Control (Psychotic Signs/Symptoms)  Description: Patient will deny hallucinations by discharge.   Patient will remain in control of behaviors towards staff and peers during hospitalization.   Outcome: Progressing

## 2022-12-14 NOTE — PLAN OF CARE
Problem: Adult Behavioral Health Plan of Care  Goal: Patient-Specific Goal (Individualization)  Description: Patient will be compliant with treatment team recommendations and medication administrations during hospitalization.  Patient will remain independent with ADLs daily.   Patient will sleep 6-8 hours per night.  Patient will eat at least 50% of meals daily.   Outcome: Progressing  Note: 15:40: Received end of shift report from MINNIE Gutierrez. Pt out in dayroom visiting with peers upon arrival---     18:00: PRN hydroxyzine 50 mg given for increasing anxiety, PRN nasal spray, compliant with 1700 medication administration--    19:00: Effective prn hydroxyzine relief---      21:15: Actively participated in groups, compliant with HS medication administration, more isolative towards evening r/t start of menses---Denies SI/HI, hallucinations. Excellent food et fluid intake. Improved insight to overall situation, brighter affect.     Face to face end of shift report to be communicated to on-coming [facility] staff.     Victoria Slaughter RN  12/13/2022  9:23 PM    Problem: Suicidal Behavior  Goal: Suicidal Behavior is Absent or Managed  Description: Patient will deny SI by discharge.   Patient will remain free from self harm/injury during hospitalization.   Patient will verbalize 3 coping skills by discharge.   Outcome: Progressing     Problem: Psychotic Signs/Symptoms  Goal: Improved Behavioral Control (Psychotic Signs/Symptoms)  Description: Patient will deny hallucinations by discharge.   Patient will remain in control of behaviors towards staff and peers during hospitalization.   Outcome: Progressing   Goal Outcome Evaluation:

## 2022-12-14 NOTE — PROGRESS NOTES
[facility]    Medical Services Progress Note    Date of Service (when I saw the patient): 12/14/2022    Assessment & Plan      Active Medical Problems:  Tinea Pedis- pt complaining of burning and itching between toes. She reports its better since putting lotion on but is peeling. Bilateral feet between toes has minimal peeling, mild erythema. Lotrimin cream bid. Nursing to monitor for new or worsening symptoms.   12/14- improving. No areas of concern    Sinusitis- reports sinus pressure, nasal congestion. CBC wnl. Afebrile. Denies sore throat, cough, nausea, vomiting. Flonase and Claritin ordered. Nursing to monitor for new or worsening symptoms and consult.      Pt medically stable, no acute medical concerns. Chronic medical problems stable. Will sign off. Please consult for any new medical issues or concerns.       Code Status: Full Code.    Lindsay Mcmahon CNP        -Data reviewed today: I reviewed all new labs and imaging results over the last 24 hours.     Physical Exam   Temp: 98.9  F (37.2  C) Temp src: Temporal BP: (!) 109/49 Pulse: 86   Resp: 14 SpO2: 98 % O2 Device: None (Room air)    Vitals:    12/04/22 0900 12/11/22 0817   Weight: 86.7 kg (191 lb 1.6 oz) 90.2 kg (198 lb 12.8 oz)     Vital Signs with Ranges  Temp:  [98.6  F (37  C)-98.9  F (37.2  C)] 98.9  F (37.2  C)  Pulse:  [86-89] 86  Resp:  [14-16] 14  BP: (109-112)/(49-76) 109/49  SpO2:  [98 %] 98 %  No intake/output data recorded.    Constitutional: awake, alert, cooperative, no apparent distress, and appears stated age, vitals stable   Eyes: Lids and lashes normal, pupils equal, round and reactive to light, extra ocular muscles intact, sclera clear, conjunctiva normal  ENT: Normocephalic, without obvious abnormality, atraumatic, sinuses tender on palpation, external ears without lesions, oral pharynx with moist mucous membranes, no erythema or exudates  Hematologic / Lymphatic: no cervical lymphadenopathy  Respiratory: No increased work  of breathing, good air exchange, clear to auscultation bilaterally, no crackles or wheezing  Cardiovascular: Normal apical impulse, regular rate and rhythm, normal S1 and S2, no S3 or S4, and no murmur noted  Neuropsychiatric: General: normal, calm and normal eye contact    Medications     clotrimazole   Topical BID     fluticasone  1 spray Both Nostrils Daily     lithium ER  900 mg Oral At Bedtime     loratadine  10 mg Oral Daily     LORazepam  1 mg Oral TID     lurasidone  40 mg Oral Daily with supper     metFORMIN  500 mg Oral Daily with supper       Data   Recent Labs   Lab 12/14/22  1021 12/10/22  0624   WBC 9.2  --    HGB 12.8  --    MCV 88  --      --    NA  --  138   POTASSIUM  --  4.5   CHLORIDE  --  103   CO2  --  25   BUN  --  12.5   CR  --  0.89   ANIONGAP  --  10   COOPER  --  8.8   GLC  --  95       No results found for this or any previous visit (from the past 24 hour(s)).

## 2022-12-14 NOTE — PLAN OF CARE
Problem: Adult Behavioral Health Plan of Care  Goal: Patient-Specific Goal (Individualization)  Description: Patient will be compliant with treatment team recommendations and medication administrations during hospitalization.  Patient will remain independent with ADLs daily.   Patient will sleep 6-8 hours per night.  Patient will eat at least 50% of meals daily.   Outcome: Progressing   Goal Outcome Evaluation:       Face to face shift report received from RN. Rounding completed, pt observed. Client rested in room for 7 hours with eyes closed and respirations noted.Face to face report will be communicated to oncoming RN.    Tim Dubose RN  12/14/2022  5:58 AM

## 2022-12-15 PROCEDURE — 250N000013 HC RX MED GY IP 250 OP 250 PS 637: Performed by: NURSE PRACTITIONER

## 2022-12-15 PROCEDURE — 36415 COLL VENOUS BLD VENIPUNCTURE: CPT | Performed by: NURSE PRACTITIONER

## 2022-12-15 PROCEDURE — 124N000001 HC R&B MH

## 2022-12-15 PROCEDURE — 99232 SBSQ HOSP IP/OBS MODERATE 35: CPT | Performed by: NURSE PRACTITIONER

## 2022-12-15 RX ORDER — PROPRANOLOL HYDROCHLORIDE 10 MG/1
10 TABLET ORAL 3 TIMES DAILY PRN
Status: DISCONTINUED | OUTPATIENT
Start: 2022-12-15 | End: 2022-12-16

## 2022-12-15 RX ORDER — LORAZEPAM 0.5 MG/1
0.5 TABLET ORAL AT BEDTIME
Status: DISCONTINUED | OUTPATIENT
Start: 2022-12-15 | End: 2022-12-16

## 2022-12-15 RX ORDER — CARBOXYMETHYLCELLULOSE SODIUM 5 MG/ML
2 SOLUTION/ DROPS OPHTHALMIC 4 TIMES DAILY PRN
Status: DISCONTINUED | OUTPATIENT
Start: 2022-12-15 | End: 2022-12-19 | Stop reason: HOSPADM

## 2022-12-15 RX ORDER — LORAZEPAM 1 MG/1
1 TABLET ORAL 2 TIMES DAILY
Status: DISCONTINUED | OUTPATIENT
Start: 2022-12-16 | End: 2022-12-19 | Stop reason: HOSPADM

## 2022-12-15 RX ADMIN — ALUMINUM HYDROXIDE, MAGNESIUM HYDROXIDE, AND SIMETHICONE 30 ML: 200; 200; 20 SUSPENSION ORAL at 15:00

## 2022-12-15 RX ADMIN — ACETAMINOPHEN 650 MG: 325 TABLET, FILM COATED ORAL at 20:35

## 2022-12-15 RX ADMIN — CARBOXYMETHYLCELLULOSE SODIUM 2 DROP: 5 SOLUTION/ DROPS OPHTHALMIC at 15:00

## 2022-12-15 RX ADMIN — ACETAMINOPHEN 650 MG: 325 TABLET, FILM COATED ORAL at 08:04

## 2022-12-15 RX ADMIN — HYDROXYZINE HYDROCHLORIDE 50 MG: 25 TABLET ORAL at 08:41

## 2022-12-15 RX ADMIN — PROPRANOLOL HYDROCHLORIDE 10 MG: 10 TABLET ORAL at 20:35

## 2022-12-15 RX ADMIN — PROPRANOLOL HYDROCHLORIDE 10 MG: 10 TABLET ORAL at 12:52

## 2022-12-15 RX ADMIN — LURASIDONE HYDROCHLORIDE 40 MG: 40 TABLET, FILM COATED ORAL at 17:23

## 2022-12-15 RX ADMIN — LITHIUM CARBONATE 900 MG: 450 TABLET, EXTENDED RELEASE ORAL at 20:35

## 2022-12-15 RX ADMIN — LORAZEPAM 0.5 MG: 0.5 TABLET ORAL at 20:35

## 2022-12-15 RX ADMIN — LORAZEPAM 1 MG: 1 TABLET ORAL at 08:04

## 2022-12-15 RX ADMIN — LORAZEPAM 1 MG: 1 TABLET ORAL at 13:56

## 2022-12-15 RX ADMIN — METFORMIN HYDROCHLORIDE 500 MG: 500 TABLET, EXTENDED RELEASE ORAL at 17:23

## 2022-12-15 RX ADMIN — HYDROXYZINE HYDROCHLORIDE 50 MG: 25 TABLET ORAL at 17:24

## 2022-12-15 RX ADMIN — HYDROCORTISONE: 25 CREAM TOPICAL at 08:36

## 2022-12-15 RX ADMIN — FLUTICASONE PROPIONATE 1 SPRAY: 50 SPRAY, METERED NASAL at 08:04

## 2022-12-15 RX ADMIN — CLOTRIMAZOLE: 1 CREAM TOPICAL at 08:36

## 2022-12-15 RX ADMIN — LORATADINE 10 MG: 10 TABLET ORAL at 08:04

## 2022-12-15 RX ADMIN — ACETAMINOPHEN 650 MG: 325 TABLET, FILM COATED ORAL at 14:22

## 2022-12-15 ASSESSMENT — ACTIVITIES OF DAILY LIVING (ADL)
ADLS_ACUITY_SCORE: 28
DRESS: SCRUBS (BEHAVIORAL HEALTH)
HYGIENE/GROOMING: INDEPENDENT
ADLS_ACUITY_SCORE: 28
ORAL_HYGIENE: INDEPENDENT
ADLS_ACUITY_SCORE: 28
LAUNDRY: UNABLE TO COMPLETE
ADLS_ACUITY_SCORE: 28

## 2022-12-15 NOTE — PLAN OF CARE
Problem: Adult Behavioral Health Plan of Care  Goal: Patient-Specific Goal (Individualization)  Description: Patient will be compliant with treatment team recommendations and medication administrations during hospitalization.  Patient will remain independent with ADLs daily.   Patient will sleep 6-8 hours per night.  Patient will eat at least 50% of meals daily.   Outcome: Progressing   Goal Outcome Evaluation:       Face to face shift report received from RN. Rounding completed, pt observed. Client rested in room for 7 hours with eyes closed and respirations noted.Face to face report will be communicated to oncoming RN.    Tim Dubose RN  12/15/2022  6:34 AM

## 2022-12-15 NOTE — PLAN OF CARE
"  Problem: Adult Behavioral Health Plan of Care  Goal: Patient-Specific Goal (Individualization)  Description: Patient will be compliant with treatment team recommendations and medication administrations during hospitalization.  Patient will remain independent with ADLs daily.   Patient will sleep 6-8 hours per night.  Patient will eat at least 50% of meals daily.   Outcome: Progressing  Note: 15:40: Received end of shift report from MINNIE Everett. Pt resting in bed upon arrival---     17:25: PRN hydroxyzine given r/t \"feeling anxious, pms\"     18:10: effective prn hyrdoxyzine relief.     20:35; PRN propanolol, PRN acetaminophen given for moderate menstrual cramps/generalized aches \"5'/10--- Lithium draw rescheduled for the AM---- r/t 10-12 hour peak time per lab; Co-RN notified NOC on-call;  Participates in available groups-- adequate food et fluid intake. Appropriate behavior with staff et peers.     21;30: Effective prn relief; pt appears to be displaying s/s of sleep @ this time.    Face to face end of shift report communicated to on-coming .     Victoria Slaughter RN  12/15/2022  10:21 PM    Problem: Suicidal Behavior  Goal: Suicidal Behavior is Absent or Managed  Description: Patient will deny SI by discharge.   Patient will remain free from self harm/injury during hospitalization.   Patient will verbalize 3 coping skills by discharge.   Outcome: Progressing     Problem: Psychotic Signs/Symptoms  Goal: Improved Behavioral Control (Psychotic Signs/Symptoms)  Description: Patient will deny hallucinations by discharge.   Patient will remain in control of behaviors towards staff and peers during hospitalization.   Outcome: Progressing   Goal Outcome Evaluation:                        "

## 2022-12-15 NOTE — PROGRESS NOTES
"[facility] PSYCHIATRY  PROGRESS NOTE     SUBJECTIVE   Prior to interviewing the patient, I met with nursing and reviewed patient's clinical condition. We discussed clinical care both before and after the interview. I have reviewed the patient's clinical course by review of records including previous notes, labs, and vital signs.     Per nursing, the patient had the following behavioral events over the last 24-hours: none    Patient resting in bed when I see her today. She reports that she is feeling \"okay\" today. She does get a bit tearful during our conversation. She has been attending groups and will socialize with peers. Notes indicate she slept 7 hours last night. She does report continued anxiety and feels of being \"overwhelmed\" inside. Feels that the Hydroxyzine helps, we also discussed adding Propranolol for anxiety/akathisia as an option which she agrees with. Reviewed risks, benefits, side effects including changes in BP and dizziness. She denies any suicidal thoughts. She also reports dry eyes, will order refresh eye drops PRN after discussion with medical NP. She will have her commitment hearing on the 19th.     MEDICATIONS   Scheduled Meds:    clotrimazole   Topical BID     fluticasone  1 spray Both Nostrils Daily     lithium ER  900 mg Oral At Bedtime     loratadine  10 mg Oral Daily     LORazepam  1 mg Oral TID     lurasidone  40 mg Oral Daily with supper     metFORMIN  500 mg Oral Daily with supper     PRN Meds:.acetaminophen, alum & mag hydroxide-simethicone, artificial tears, diphenhydrAMINE, hydrocortisone (Perianal), hydrOXYzine, melatonin, senna-docusate, sodium chloride     ALLERGIES   Allergies   Allergen Reactions     Penicillins Unknown        MENTAL STATUS EXAM   Vitals: /72 (BP Location: Left arm)   Pulse 88   Temp 98.7  F (37.1  C) (Temporal)   Resp 14   Ht 1.626 m (5' 4\")   Wt 90.2 kg (198 lb 12.8 oz)   LMP  (LMP Unknown)   SpO2 97%   BMI 34.12 kg/m      Appearance: " "Alert, oriented, dressed in hospital scrubs, casually groomed  Attitude: Cooperative, pleasant  Speech: normal rate/rhythm   Mood: \"okay\", occasional periods of anxiety  Affect:  Full range  Psychomotor Behavior: No tremor, rigidity, No akathisia, or psychomotor retardation    Thought Process: Logical, linear, more goal oriented  Associations: No loose associations   Thought Content: Denies SI, HI  No psychotic or delusional thought noted.   Insight: Fair   Judgment: Fair  Oriented to: Person, place, and time  Attention Span and Concentration: Intact  Recent and Remote Memory: Intact  Language: English with appropriate syntax and vocabulary  Fund of Knowledge: Average  Muscle Strength and Tone: Grossly normal  Gait and Station: Grossly normal       LABS   No results found for this or any previous visit (from the past 24 hour(s)).      IMPRESSION   This is a 37 year old female with a PMH of major depression and methamphetamine and alcohol use disorder previously admitted on this unit 8/2022 status post suicide attempt currently presenting with concerns for psychosis and catatonia with episodes in which the patient states she can hear, but cannot respond that started on 11/27/22. Patient was started on Clonidine and Risperdal 2 days prior to the onset. Will hold as they may be contributing factors. Patient notes worsening auditory hallucinations the past 2 months. Patient presents mute and uncooperative at times, question if volitional, certainly warrants continued monitoring.     Today:  Patient with improved mood, attends most group programming, and more accepting of legal process. No evidence of catatonia - ativan taper in process.  Next court date 12/19        DIAGNOSES   1. 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 Bipolar type 1 most recent mixed vs depressed with catatonia  3. Amphetamine use disorder, moderate       PLAN     Location: Unit 5   Legal Status: " Court Hold  Her Preliminary Hearing is scheduled 12/09/2022 @ 10:30 am. Her commitment hearing is scheduled for 12/19/2022 @ 10:30 am.    Safety Assessment:    Behavioral Orders   Procedures     Code 1 - Restrict to Unit     Routine Programming     As clinically indicated     Status 15     Every 15 minutes.      PTA medications continued/changed:     -Did not continue Risperdal due to worsened symptoms when initiated while at the [facility]    New medications tried and stopped:     -- gabapentin 300mg tid - discontinued due to allergic reaction, rash    New medications initiated:     -Continue Ativan 2 mg 3 times a day - decreased to 1mg bid 12/10 and 2mg at hs  -Possibly increase Latuda in near future to watch for akathisia as she did experience some in the past and the dose was decreased  -Continue lithium 750 mg QHS - increased to 900mg 12/10  -TSH BMP CMP (WNL) and Lithium level 0.5 on 12/10/2022  -Continue Metformin 250 mg BID - increase as tolerated    Today's Changes:   -Decrease Ativan to 1 mg BID and 0.5 mg at bedtime- continuing taper, no symptoms of catatonia noted  -Add Propranolol 10 mg TID prn anxiety/akathisia    Programming: Patient will be treated in a therapeutic milieu with appropriate individual and group therapies. Education will be provided on diagnoses, medications, and treatments.     Medical diagnoses:  Per medicine    Consult: None    Labs: Lithium level ordered for 12/16 AM    Anticipated LOS: TBD  Disposition: Likely home with family and services       ATTESTATION      Carrie Sanders NP

## 2022-12-15 NOTE — PLAN OF CARE
Problem: Adult Behavioral Health Plan of Care  Goal: Patient-Specific Goal (Individualization)  Description: Patient will be compliant with treatment team recommendations and medication administrations during hospitalization.  Patient will remain independent with ADLs daily.   Patient will sleep 6-8 hours per night.  Patient will eat at least 50% of meals daily.   Outcome: Progressing    Face to face end of shift report received from night shift RN. Rounding completed. Pt observed calmly conversing with a peer in the lobby. 0804 Pt reports 4/10 headache pain; Pt requests and receives Tylenol prn as ordered. Pt accepts and receives this shift's scheduled medications. 0836 Pt requests and receives Hydrocortisone prn as ordered. 0841 Pt begins crying and reports intense anxiety; Pt reports feeling overwhelmed; Therapeutic communication utilized; Pt requests and receives Hydroxyzine prn as ordered. Pt reports being anxious about menses starting and unsure how she will obtain tampons; Pt informed tampons are available; Pt requests and receives supplies. Pt attends group. 1220 Pt tearful; Pt reports continuum of feeling overwhelmed and anxious. Patient accepts and tolerates meeting with provider. 1252 Pt requests and receives Propranolol prn as ordered for anxiety; Pt reports substantial effectiveness at reassessment. 1422 Pt reports 5/10 headache pain; Pt requests and receives Tylenol prn as ordered. 1500 Pt requests and receives Maalox and Refresh plus prn as ordered. Will continue to support the needs of the patient. Face to face end of shift report to be given to evening shift RN.       Problem: Suicidal Behavior  Goal: Suicidal Behavior is Absent or Managed  Description: Patient will deny SI by discharge.   Patient will remain free from self harm/injury during hospitalization.   Patient will verbalize 3 coping skills by discharge.   Outcome: Progressing -- Pt remained free of self injury and harm throughout the  duration of this shift.       Problem: Psychotic Signs/Symptoms  Goal: Improved Behavioral Control (Psychotic Signs/Symptoms)  Description: Patient will deny hallucinations by discharge.   Patient will remain in control of behaviors towards staff and peers during hospitalization.   Outcome: Progressing

## 2022-12-16 LAB — LITHIUM SERPL-SCNC: 0.5 MMOL/L (ref 0.6–1.2)

## 2022-12-16 PROCEDURE — 250N000013 HC RX MED GY IP 250 OP 250 PS 637: Performed by: NURSE PRACTITIONER

## 2022-12-16 PROCEDURE — 99233 SBSQ HOSP IP/OBS HIGH 50: CPT | Performed by: NURSE PRACTITIONER

## 2022-12-16 PROCEDURE — 124N000001 HC R&B MH

## 2022-12-16 PROCEDURE — 36415 COLL VENOUS BLD VENIPUNCTURE: CPT | Performed by: NURSE PRACTITIONER

## 2022-12-16 PROCEDURE — 80178 ASSAY OF LITHIUM: CPT | Performed by: NURSE PRACTITIONER

## 2022-12-16 RX ORDER — LORAZEPAM 1 MG/1
2 TABLET ORAL AT BEDTIME
Status: DISCONTINUED | OUTPATIENT
Start: 2022-12-16 | End: 2022-12-19 | Stop reason: HOSPADM

## 2022-12-16 RX ORDER — SULINDAC 150 MG/1
150 TABLET ORAL 2 TIMES DAILY PRN
Status: DISCONTINUED | OUTPATIENT
Start: 2022-12-16 | End: 2022-12-16

## 2022-12-16 RX ORDER — PROPRANOLOL HYDROCHLORIDE 20 MG/1
20 TABLET ORAL 3 TIMES DAILY PRN
Status: DISCONTINUED | OUTPATIENT
Start: 2022-12-16 | End: 2022-12-18

## 2022-12-16 RX ORDER — IBUPROFEN 800 MG/1
800 TABLET, FILM COATED ORAL 3 TIMES DAILY PRN
Status: DISCONTINUED | OUTPATIENT
Start: 2022-12-16 | End: 2022-12-19 | Stop reason: HOSPADM

## 2022-12-16 RX ORDER — LAMOTRIGINE 25 MG/1
25 TABLET ORAL DAILY
Status: DISCONTINUED | OUTPATIENT
Start: 2022-12-16 | End: 2022-12-19 | Stop reason: HOSPADM

## 2022-12-16 RX ADMIN — LORATADINE 10 MG: 10 TABLET ORAL at 08:19

## 2022-12-16 RX ADMIN — METFORMIN HYDROCHLORIDE 500 MG: 500 TABLET, EXTENDED RELEASE ORAL at 16:35

## 2022-12-16 RX ADMIN — IBUPROFEN 800 MG: 800 TABLET, FILM COATED ORAL at 12:20

## 2022-12-16 RX ADMIN — PROPRANOLOL HYDROCHLORIDE 20 MG: 20 TABLET ORAL at 16:35

## 2022-12-16 RX ADMIN — HYDROXYZINE HYDROCHLORIDE 50 MG: 25 TABLET ORAL at 13:30

## 2022-12-16 RX ADMIN — HYDROXYZINE HYDROCHLORIDE 50 MG: 25 TABLET ORAL at 20:54

## 2022-12-16 RX ADMIN — LORAZEPAM 1 MG: 1 TABLET ORAL at 13:27

## 2022-12-16 RX ADMIN — ACETAMINOPHEN 650 MG: 325 TABLET, FILM COATED ORAL at 08:37

## 2022-12-16 RX ADMIN — LAMOTRIGINE 25 MG: 25 TABLET ORAL at 12:20

## 2022-12-16 RX ADMIN — PROPRANOLOL HYDROCHLORIDE 10 MG: 10 TABLET ORAL at 09:26

## 2022-12-16 RX ADMIN — IBUPROFEN 800 MG: 800 TABLET, FILM COATED ORAL at 20:54

## 2022-12-16 RX ADMIN — FLUTICASONE PROPIONATE 1 SPRAY: 50 SPRAY, METERED NASAL at 08:19

## 2022-12-16 RX ADMIN — HYDROCORTISONE: 25 CREAM TOPICAL at 08:37

## 2022-12-16 RX ADMIN — LORAZEPAM 2 MG: 1 TABLET ORAL at 20:23

## 2022-12-16 RX ADMIN — CLOTRIMAZOLE: 1 CREAM TOPICAL at 08:37

## 2022-12-16 RX ADMIN — LORAZEPAM 1 MG: 1 TABLET ORAL at 08:19

## 2022-12-16 RX ADMIN — LURASIDONE HYDROCHLORIDE 40 MG: 40 TABLET, FILM COATED ORAL at 16:35

## 2022-12-16 ASSESSMENT — ACTIVITIES OF DAILY LIVING (ADL)
HYGIENE/GROOMING: INDEPENDENT
ADLS_ACUITY_SCORE: 28
DRESS: SCRUBS (BEHAVIORAL HEALTH)
ADLS_ACUITY_SCORE: 28
HYGIENE/GROOMING: INDEPENDENT
ADLS_ACUITY_SCORE: 28
LAUNDRY: UNABLE TO COMPLETE
ADLS_ACUITY_SCORE: 28
ORAL_HYGIENE: INDEPENDENT

## 2022-12-16 NOTE — PROGRESS NOTES
"[facility] PSYCHIATRY  PROGRESS NOTE     SUBJECTIVE   Ilana is brighter today than I have seen her. I have not worked with her in over one week and has had significant improvement.  [facility] states she did not sleep last night. Her ativan hs dose was lowered to 0.5 though I believe it was intended that her AM dose be lowered instead of the HS dose and she would like it higher for sleep. She has no hallucinations or suicidal thoughts. She states she is nervous \"about the amount I am peeing. I had to wake up 8 times last night to pee\". Her face appears mildly edemetous and possibly mild in hands. No pedal edema. Did telll her that I was concerned this was side effect of lithium. We discussed stopping lithium and starting Lamictal though continuing with latuda. Considered lamictal though due to child bearing age and wants to avoid weight gain she would like to try lamictal over depakote.     MEDICATIONS   Scheduled Meds:    clotrimazole   Topical BID     fluticasone  1 spray Both Nostrils Daily     lamoTRIgine  25 mg Oral Daily     loratadine  10 mg Oral Daily     LORazepam  1 mg Oral BID     LORazepam  2 mg Oral At Bedtime     lurasidone  40 mg Oral Daily with supper     metFORMIN  500 mg Oral Daily with supper     PRN Meds:.acetaminophen, alum & mag hydroxide-simethicone, artificial tears, diphenhydrAMINE, hydrocortisone (Perianal), hydrOXYzine, ibuprofen, melatonin, propranolol, senna-docusate, sodium chloride     ALLERGIES   Allergies   Allergen Reactions     Penicillins Unknown        MENTAL STATUS EXAM   Vitals: /76 (BP Location: Right arm)   Pulse 80   Temp 98.6  F (37  C) (Temporal)   Resp 14   Ht 1.626 m (5' 4\")   Wt 90.2 kg (198 lb 12.8 oz)   LMP  (LMP Unknown)   SpO2 98%   BMI 34.12 kg/m      Appearance: Alert, oriented, dressed in hospital scrubs, casually groomed  Attitude: Cooperative, pleasant  Speech: normal rate/rhythm   Mood: \"okay\", occasional periods of anxiety  Affect:  Full " range  Psychomotor Behavior: No tremor, rigidity, No akathisia, or psychomotor retardation    Thought Process: Logical, linear, more goal oriented  Associations: No loose associations   Thought Content: Denies SI, HI  No psychotic or delusional thought noted.   Insight: Fair   Judgment: Fair  Oriented to: Person, place, and time  Attention Span and Concentration: Intact  Recent and Remote Memory: Intact  Language: English with appropriate syntax and vocabulary  Fund of Knowledge: Average  Muscle Strength and Tone: Grossly normal  Gait and Station: Grossly normal       LABS   Recent Results (from the past 24 hour(s))   Lithium level    Collection Time: 12/16/22  6:09 AM   Result Value Ref Range    Lithium 0.5 (L) 0.6 - 1.2 mmol/L         IMPRESSION   This is a 37 year old female with a PMH of major depression and methamphetamine and alcohol use disorder previously admitted on this unit 8/2022 status post suicide attempt currently presenting with concerns for psychosis and catatonia with episodes in which the patient states she can hear, but cannot respond that started on 11/27/22. Patient was started on Clonidine and Risperdal 2 days prior to the onset. Will hold as they may be contributing factors. Patient notes worsening auditory hallucinations the past 2 months. Patient presents mute and uncooperative at times, question if volitional, certainly warrants continued monitoring.     Today:  Patient with improved mood, attends most group programming, and more accepting of legal process. No evidence of catatonia - ativan taper in process.  Next court date 12/19        DIAGNOSES   1. 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 Bipolar type 1 most recent mixed vs depressed with catatonia  3. Amphetamine use disorder, moderate       PLAN     Location: Unit 5   Legal Status: Court Hold  Her Preliminary Hearing is scheduled 12/09/2022 @ 10:30 am. Her commitment hearing is  scheduled for 12/19/2022 @ 10:30 am.    Safety Assessment:    Behavioral Orders   Procedures     Code 1 - Restrict to Unit     Routine Programming     As clinically indicated     Status 15     Every 15 minutes.      PTA medications continued/changed:     -Did not continue Risperdal due to worsened symptoms when initiated while at the [facility]    New medications tried and stopped:     -- gabapentin 300mg tid - discontinued due to allergic reaction, rash    New medications initiated:     -Continue Ativan 2 mg 3 times a day - decreased to 1mg bid 12/10 and 2mg at hs  -Possibly increase Latuda in near future to watch for akathisia as she did experience some in the past and the dose was decreased  -Continue lithium 750 mg QHS - increased to 900mg 12/10  -TSH BMP CMP (WNL) and Lithium level 0.5 on 12/10/2022  -Continue Metformin 250 mg BID - increase as tolerated    Today's Changes:     -increase propranolol for anxiety  -increase hs ativan to 2 mg  -stop lithium due to excessive urination  -start lamictal 25 mg daily     Programming: Patient will be treated in a therapeutic milieu with appropriate individual and group therapies. Education will be provided on diagnoses, medications, and treatments.     Medical diagnoses:  Per medicine    Consult: None    Labs: Lithium level ordered for 12/16 AM    Anticipated LOS: TBD  Disposition: Likely home with family and services       ATTESTATION      April Sharon Davis NP

## 2022-12-16 NOTE — PLAN OF CARE
Problem: Adult Behavioral Health Plan of Care  Goal: Patient-Specific Goal (Individualization)  Description: Patient will be compliant with treatment team recommendations and medication administrations during hospitalization.  Patient will remain independent with ADLs daily.   Patient will sleep 6-8 hours per night.  Patient will eat at least 50% of meals daily.   Outcome: Progressing    Face to face end of shift report received from night shift RN. Rounding completed. Pt observed conversing with peers while sitting in the lobby. Pt calm, pleasant, and polite in interactions. Pt denies SI, HI, and hallucinations. Pt accepts and receives this shift's scheduled medications. 0837 Pt reports 6/10 headache pain and menses cramping pain; Pt requests and receives Tylenol prn as ordered; Pt requests additional prn for cramping pain be available for menses. 0837 Pt requests and receives Hydrocortisone cream prn as ordered. Pt attends group. 0926 Pt tearful and reports feeling overwhelmed; Pt requests and receives Propranolol prn as ordered; At re-assessment. Pt reports effectiveness. 1220 Pt reports 6/10 cramping menses pain; Pt requests and receives Ibuprofen prn as ordered. 1330 Pt requests and receives Hydroxyzine prn as ordered for anxiety. Will continue to support the needs of the patient. Face to face end of shift report to be given to evening shift RN.       Problem: Suicidal Behavior  Goal: Suicidal Behavior is Absent or Managed  Description: Patient will deny SI by discharge.   Patient will remain free from self harm/injury during hospitalization.   Patient will verbalize 3 coping skills by discharge.   Outcome: Progressing -- Pt remained free of self injury and harm throughout the duration of this shift.      Problem: Psychotic Signs/Symptoms  Goal: Improved Behavioral Control (Psychotic Signs/Symptoms)  Description: Patient will deny hallucinations by discharge.   Patient will remain in control of behaviors  towards staff and peers during hospitalization.   Outcome: Progressing

## 2022-12-16 NOTE — PLAN OF CARE
Problem: Adult Behavioral Health Plan of Care  Goal: Patient-Specific Goal (Individualization)  Description: Patient will be compliant with treatment team recommendations and medication administrations during hospitalization.  Patient will remain independent with ADLs daily.   Patient will sleep 6-8 hours per night.  Patient will eat at least 50% of meals daily.   Outcome: Progressing     Problem: Suicidal Behavior  Goal: Suicidal Behavior is Absent or Managed  Description: Patient will deny SI by discharge.   Patient will remain free from self harm/injury during hospitalization.   Patient will verbalize 3 coping skills by discharge.   Outcome: Progressing     Problem: Psychotic Signs/Symptoms  Goal: Improved Behavioral Control (Psychotic Signs/Symptoms)  Description: Patient will deny hallucinations by discharge.   Patient will remain in control of behaviors towards staff and peers during hospitalization.   Outcome: Progressing   Goal Outcome Evaluation:       1635- pt spent time in the group room journaling. Requested and received Propranolol 20 mg po c/o anxiety. Denies suicidal thoughts or plan. No hallucinations- visual or auditory. Cooperative and pleasant during assessment.   2054- requested and received Ibuprofen 800 mg po c/o 6/10 menses pain and Hydroxyzine 50 mg po sleep  Face to face end of shift report communicated to MINNIE Gee RN  12/16/2022  4:48 PM

## 2022-12-17 PROCEDURE — 250N000013 HC RX MED GY IP 250 OP 250 PS 637: Performed by: NURSE PRACTITIONER

## 2022-12-17 PROCEDURE — 124N000001 HC R&B MH

## 2022-12-17 RX ADMIN — FLUTICASONE PROPIONATE 1 SPRAY: 50 SPRAY, METERED NASAL at 08:53

## 2022-12-17 RX ADMIN — CLOTRIMAZOLE: 1 CREAM TOPICAL at 08:53

## 2022-12-17 RX ADMIN — LURASIDONE HYDROCHLORIDE 40 MG: 40 TABLET, FILM COATED ORAL at 17:02

## 2022-12-17 RX ADMIN — LORATADINE 10 MG: 10 TABLET ORAL at 08:53

## 2022-12-17 RX ADMIN — HYDROCORTISONE: 25 CREAM TOPICAL at 08:53

## 2022-12-17 RX ADMIN — IBUPROFEN 800 MG: 800 TABLET, FILM COATED ORAL at 17:02

## 2022-12-17 RX ADMIN — PROPRANOLOL HYDROCHLORIDE 20 MG: 20 TABLET ORAL at 22:18

## 2022-12-17 RX ADMIN — ACETAMINOPHEN 650 MG: 325 TABLET, FILM COATED ORAL at 20:40

## 2022-12-17 RX ADMIN — IBUPROFEN 800 MG: 800 TABLET, FILM COATED ORAL at 07:46

## 2022-12-17 RX ADMIN — PROPRANOLOL HYDROCHLORIDE 20 MG: 20 TABLET ORAL at 17:03

## 2022-12-17 RX ADMIN — LORAZEPAM 1 MG: 1 TABLET ORAL at 13:55

## 2022-12-17 RX ADMIN — PROPRANOLOL HYDROCHLORIDE 20 MG: 20 TABLET ORAL at 09:59

## 2022-12-17 RX ADMIN — ACETAMINOPHEN 650 MG: 325 TABLET, FILM COATED ORAL at 13:55

## 2022-12-17 RX ADMIN — METFORMIN HYDROCHLORIDE 500 MG: 500 TABLET, EXTENDED RELEASE ORAL at 17:02

## 2022-12-17 RX ADMIN — LORAZEPAM 1 MG: 1 TABLET ORAL at 08:53

## 2022-12-17 RX ADMIN — HYDROXYZINE HYDROCHLORIDE 50 MG: 25 TABLET ORAL at 13:55

## 2022-12-17 RX ADMIN — HYDROXYZINE HYDROCHLORIDE 50 MG: 25 TABLET ORAL at 08:53

## 2022-12-17 RX ADMIN — LORAZEPAM 2 MG: 1 TABLET ORAL at 20:40

## 2022-12-17 RX ADMIN — LAMOTRIGINE 25 MG: 25 TABLET ORAL at 08:53

## 2022-12-17 RX ADMIN — HYDROXYZINE HYDROCHLORIDE 50 MG: 25 TABLET ORAL at 20:40

## 2022-12-17 ASSESSMENT — ACTIVITIES OF DAILY LIVING (ADL)
ADLS_ACUITY_SCORE: 28
ORAL_HYGIENE: INDEPENDENT
ADLS_ACUITY_SCORE: 28
HYGIENE/GROOMING: INDEPENDENT
ADLS_ACUITY_SCORE: 28
ADLS_ACUITY_SCORE: 28
HYGIENE/GROOMING: INDEPENDENT
ADLS_ACUITY_SCORE: 28
ADLS_ACUITY_SCORE: 28
LAUNDRY: UNABLE TO COMPLETE
ADLS_ACUITY_SCORE: 28
DRESS: SCRUBS (BEHAVIORAL HEALTH)

## 2022-12-17 NOTE — PLAN OF CARE
Problem: Adult Behavioral Health Plan of Care  Goal: Patient-Specific Goal (Individualization)  Description: Patient will be compliant with treatment team recommendations and medication administrations during hospitalization.  Patient will remain independent with ADLs daily.   Patient will sleep 6-8 hours per night.  Patient will eat at least 50% of meals daily.   Outcome: Progressing     Problem: Suicidal Behavior  Goal: Suicidal Behavior is Absent or Managed  Description: Patient will deny SI by discharge.   Patient will remain free from self harm/injury during hospitalization.   Patient will verbalize 3 coping skills by discharge.   Outcome: Progressing     Problem: Psychotic Signs/Symptoms  Goal: Improved Behavioral Control (Psychotic Signs/Symptoms)  Description: Patient will deny hallucinations by discharge.   Patient will remain in control of behaviors towards staff and peers during hospitalization.   Outcome: Progressing   Goal Outcome Evaluation:      1703- requested and received Advil 800 mg po 6/10 cramps along with Propranolol  20 mg po anxiety. Attended groups. Socializes with peers. No suicidal thoughts or plans. No hallucinations voiced  2040- medicated with Hydroxizine 50 mg and Tylenol 2 tabs po for 4/10 cramps and anxiety  2218- requested and received propranolol 20 mg po  Face to face end of shift report communicated to MINNIE Gee RN  12/17/2022  5:20 PM

## 2022-12-17 NOTE — PLAN OF CARE
Face to face shift report received from nurse. Rounding completed, pt observed.    Problem: Adult Behavioral Health Plan of Care  Goal: Patient-Specific Goal (Individualization)  Description: Patient will be compliant with treatment team recommendations and medication administrations during hospitalization.  Patient will remain independent with ADLs daily.   Patient will sleep 6-8 hours per night.  Patient will eat at least 50% of meals daily.   Outcome: Progressing  Pt has been in bed with eyes closed and regular respirations observed all night. Will continue to monitor. Patient slept 6 hours over night.     Face to face report will be communicated to oncoming RN.    Armaan Baltazar RN  12/17/2022

## 2022-12-17 NOTE — PLAN OF CARE
Problem: Adult Behavioral Health Plan of Care  Goal: Patient-Specific Goal (Individualization)  Description: Patient will be compliant with treatment team recommendations and medication administrations during hospitalization.  Patient will remain independent with ADLs daily.   Patient will sleep 6-8 hours per night.  Patient will eat at least 50% of meals daily.   Outcome: Progressing    Face to face end of shift report received from night shift RN. Rounding completed. Pt observed calmly conversing with peers and watching TV while sitting in the lobby. Pt reports 6/10 cramping menses pain; 0746 Pt requests and receives Ibuprofen prn as ordered. Pt denies SI, HI, and hallucinations. Pt pleasant, smiling, and polite in conversation. Pt accepts and receives this shift's scheduled medications. 0853 Pt requests and receives hydroxyzine prn as ordered for anxiety. 0959 Pt reports increasing anxiety after talking with her tearful roommate; Pt requests and receives Propranolol prn as ordered for anxiety. Pt attends group. 1355 Pt reports 5/10 cramping menses pain; Pt requests and receives Tylenol prn as ordered. Will continue to support the needs of the patient. Face to face end of shift report to be given to evening shift RN.       Problem: Suicidal Behavior  Goal: Suicidal Behavior is Absent or Managed  Description: Patient will deny SI by discharge.   Patient will remain free from self harm/injury during hospitalization.   Patient will verbalize 3 coping skills by discharge.   Outcome: Progressing -- Pt remained free of self injury and harm throughout the duration of this shift.       Problem: Psychotic Signs/Symptoms  Goal: Improved Behavioral Control (Psychotic Signs/Symptoms)  Description: Patient will deny hallucinations by discharge.   Patient will remain in control of behaviors towards staff and peers during hospitalization.   Outcome: Progressing

## 2022-12-18 PROCEDURE — 124N000001 HC R&B MH

## 2022-12-18 PROCEDURE — 250N000013 HC RX MED GY IP 250 OP 250 PS 637: Performed by: NURSE PRACTITIONER

## 2022-12-18 PROCEDURE — 99239 HOSP IP/OBS DSCHRG MGMT >30: CPT | Performed by: NURSE PRACTITIONER

## 2022-12-18 RX ORDER — IBUPROFEN 800 MG/1
800 TABLET, FILM COATED ORAL 3 TIMES DAILY PRN
COMMUNITY
Start: 2022-12-18 | End: 2023-12-12

## 2022-12-18 RX ORDER — CLOTRIMAZOLE 1 %
CREAM (GRAM) TOPICAL 2 TIMES DAILY
Qty: 14 G | Refills: 0 | Status: SHIPPED | OUTPATIENT
Start: 2022-12-18 | End: 2023-12-12

## 2022-12-18 RX ORDER — PROPRANOLOL HYDROCHLORIDE 10 MG/1
30 TABLET ORAL 3 TIMES DAILY PRN
Qty: 180 TABLET | Refills: 1 | Status: SHIPPED | OUTPATIENT
Start: 2022-12-18 | End: 2023-12-12

## 2022-12-18 RX ORDER — LURASIDONE HYDROCHLORIDE 40 MG/1
40 TABLET, FILM COATED ORAL
Qty: 30 TABLET | Refills: 1 | Status: SHIPPED | OUTPATIENT
Start: 2022-12-18 | End: 2023-02-21

## 2022-12-18 RX ORDER — LAMOTRIGINE 25 MG/1
TABLET ORAL
Qty: 60 TABLET | Refills: 0 | Status: SHIPPED | OUTPATIENT
Start: 2022-12-18 | End: 2023-12-12

## 2022-12-18 RX ORDER — LORAZEPAM 1 MG/1
TABLET ORAL
Qty: 120 TABLET | Refills: 1 | Status: SHIPPED | OUTPATIENT
Start: 2022-12-18 | End: 2022-12-19

## 2022-12-18 RX ORDER — AMOXICILLIN 250 MG
1 CAPSULE ORAL 2 TIMES DAILY PRN
Start: 2022-12-18 | End: 2023-12-12

## 2022-12-18 RX ORDER — CARBOXYMETHYLCELLULOSE SODIUM 5 MG/ML
2 SOLUTION/ DROPS OPHTHALMIC 4 TIMES DAILY PRN
COMMUNITY
Start: 2022-12-18 | End: 2023-12-12

## 2022-12-18 RX ADMIN — PROPRANOLOL HYDROCHLORIDE 20 MG: 20 TABLET ORAL at 08:49

## 2022-12-18 RX ADMIN — PROPRANOLOL HYDROCHLORIDE 30 MG: 10 TABLET ORAL at 14:33

## 2022-12-18 RX ADMIN — LURASIDONE HYDROCHLORIDE 40 MG: 40 TABLET, FILM COATED ORAL at 17:20

## 2022-12-18 RX ADMIN — LORAZEPAM 1 MG: 1 TABLET ORAL at 13:39

## 2022-12-18 RX ADMIN — LORAZEPAM 2 MG: 1 TABLET ORAL at 20:35

## 2022-12-18 RX ADMIN — HYDROXYZINE HYDROCHLORIDE 50 MG: 25 TABLET ORAL at 12:27

## 2022-12-18 RX ADMIN — FLUTICASONE PROPIONATE 1 SPRAY: 50 SPRAY, METERED NASAL at 08:51

## 2022-12-18 RX ADMIN — HYDROCORTISONE: 25 CREAM TOPICAL at 09:05

## 2022-12-18 RX ADMIN — CLOTRIMAZOLE: 1 CREAM TOPICAL at 09:06

## 2022-12-18 RX ADMIN — LORAZEPAM 1 MG: 1 TABLET ORAL at 08:49

## 2022-12-18 RX ADMIN — METFORMIN HYDROCHLORIDE 500 MG: 500 TABLET ORAL at 17:20

## 2022-12-18 RX ADMIN — IBUPROFEN 800 MG: 800 TABLET, FILM COATED ORAL at 17:20

## 2022-12-18 RX ADMIN — LAMOTRIGINE 25 MG: 25 TABLET ORAL at 08:49

## 2022-12-18 RX ADMIN — HYDROXYZINE HYDROCHLORIDE 50 MG: 25 TABLET ORAL at 17:20

## 2022-12-18 RX ADMIN — PROPRANOLOL HYDROCHLORIDE 30 MG: 10 TABLET ORAL at 20:34

## 2022-12-18 RX ADMIN — LORATADINE 10 MG: 10 TABLET ORAL at 08:49

## 2022-12-18 RX ADMIN — IBUPROFEN 800 MG: 800 TABLET, FILM COATED ORAL at 08:49

## 2022-12-18 ASSESSMENT — ACTIVITIES OF DAILY LIVING (ADL)
HYGIENE/GROOMING: INDEPENDENT
ADLS_ACUITY_SCORE: 28
HYGIENE/GROOMING: INDEPENDENT
ADLS_ACUITY_SCORE: 28

## 2022-12-18 NOTE — PLAN OF CARE
Problem: Adult Behavioral Health Plan of Care  Goal: Patient-Specific Goal (Individualization)  Description: Patient will be compliant with treatment team recommendations and medication administrations during hospitalization.  Patient will remain independent with ADLs daily.   Patient will sleep 6-8 hours per night.  Patient will eat at least 50% of meals daily.   Outcome: Progressing     Problem: Adult Behavioral Health Plan of Care  Goal: Adheres to Safety Considerations for Self and Others  Outcome: Progressing     Problem: Adult Behavioral Health Plan of Care  Goal: Absence of New-Onset Illness or Injury  Outcome: Progressing     Problem: Suicidal Behavior  Goal: Suicidal Behavior is Absent or Managed  Description: Patient will deny SI by discharge.   Patient will remain free from self harm/injury during hospitalization.   Patient will verbalize 3 coping skills by discharge.   Outcome: Progressing       Problem: Psychotic Signs/Symptoms  Goal: Improved Behavioral Control (Psychotic Signs/Symptoms)  Description: Patient will deny hallucinations by discharge.   Patient will remain in control of behaviors towards staff and peers during hospitalization.   Outcome: Progressing    09:00 - Pt awake and out in lounge for breakfast, denies feeling suicidal, Face to face end of shift report was received from night shift rn.  09:30 - pt was medicated with ibuprofen for cramps 5/10 and propranolol for anxiety 5/10  Also used hemorrhoid cream. Pt cooperative and pleasant, denies suicidal ideation or any hallucinations.   10:15 - pt states she has had relief from pain after meds.  12:28 - medicated with atarax for c/o anxiety 5/10  14:35 medicated with po propranolol for anxiety 4/10   Goal Outcome Evaluation:

## 2022-12-18 NOTE — PLAN OF CARE
Face to face shift report received from nurse. Rounding completed, pt observed.    Problem: Adult Behavioral Health Plan of Care  Goal: Patient-Specific Goal (Individualization)  Description: Patient will be compliant with treatment team recommendations and medication administrations during hospitalization.  Patient will remain independent with ADLs daily.   Patient will sleep 6-8 hours per night.  Patient will eat at least 50% of meals daily.   Outcome: Progressing  Pt has been in bed with eyes closed and regular respirations observed all night. Will continue to monitor. Patient slept 7 hours through the shift.    Face to face report will be communicated to oncoming RN.    Armaan Baltazar RN  12/18/2022

## 2022-12-18 NOTE — DISCHARGE SUMMARY
[facility] PSYCHIATRY  DISCHARGE SUMMARY     DISCHARGE DATA     Ilana Aguilar MRN# [id number]   Age: 37 year old YOB: 1985     Date of Admission:  11/28/2022  Date of Discharge:  December 18, 2022  Discharge Provider:  Rosemarie Davis NP       REASON FOR ADMISSION     Ilana was initially at the [facility] for auditory hallucinations and change in behavior.  She was started on Risperdal while at the [facility] and it did not appear that she had significant symptoms of catatonia prior to starting the Risperdal though there was some mild questionable catatonic symptoms.  After starting the Risperdal she became almost mute with severe psychomotor retardation.  She was then brought to the psychiatric unit for further assessment from the [facility] and while she was on our unit she was not responding much to staff and refused to take medication for the first few days she was here she was eating small amounts of food but was hardly communicating.  She would have a bizarre smile on her face when staff would ask her questions and at times her affect was a strange staff did believe she was possibly feigning her symptoms which she was definitely not.       DISCHARGE DIAGNOSIS     1.  Bipolar disorder type I most recent episode mixed severe psychotic features and catatonia  2.  Methamphetamine use disorder, mild         CONSULTS   None needed while she was here       HOSPITAL COURSE     Legal status: Orders Placed This Encounter      Legal status Court Hold    Patient was admitted to unit 5 due to the aforementioned presentation. The patient was placed under 15 minute checks to ensure patient safety. The patient participated in unit programming and groups as able.  Prior to admission Ilana Aguilar was taking Risperdal and this was discontinued due to catatonia.  She was treated with Ativan and the catatonia improved significantly when the dose was 2 mg 3 times a day.  We did  taper the dose a bit though had to adjust it a bit higher when some of the catatonic symptoms worsened.  When the lithium was discontinued Latuda was added.  She is very fearful of weight gain with medications and she was started on metformin to prevent weight gain.  Lamictal was started with the plan to titrate her to a higher dose on an outpatient basis.  The following medication changes were made  during hospitalization: She was initially started on lithium though she had excessive urination and we did have to discontinue it.  She did well on the lithium though likely could have led to kidney dysfunction and diabetes insipidus.  She was started on Latuda which she tolerated well.    With these changes and supports the patient noticed improvement in their symptoms and felt sufficiently ready for discharge. As a result, Ilana Aguilar was discharged. At the time of discharge, Ilana Aguilar was determined to not be a danger to self or others. At the current time of discharge, the patient does not meet criteria for involuntary hospitalization. On the day of discharge, the patient reports that they do not have suicidal or homicidal ideation. Steps taken to minimize risk include: assessing patient s behavior and thought process daily during hospital stay, discharging patient with adequate plan for follow up for mental and physical health and discussing safety plan of returning to the hospital should the patient ever have thoughts of harming themselves or others. Therefore, based on all available evidence including the factors cited above, the patient does not appear to be at imminent risk for self-harm, and is appropriate for outpatient level of care. However, if patient uses substances or is medication non-adherent, their risk of decompensation and SI will be elevated. This was discussed with the patient.       DISCHARGE MEDICATIONS     Current Discharge Medication List      START taking these medications    Details    carboxymethylcellulose PF (REFRESH PLUS) 0.5 % ophthalmic solution Place 2 drops into both eyes 4 times daily as needed for dry eyes      clotrimazole (LOTRIMIN) 1 % external cream Apply topically 2 times daily  Qty: 14 g, Refills: 0    Associated Diagnoses: Bipolar affective disorder, currently manic, severe, with psychotic features (H)      ibuprofen (ADVIL/MOTRIN) 800 MG tablet Take 1 tablet (800 mg) by mouth 3 times daily as needed for inflammatory pain      lamoTRIgine (LAMICTAL) 25 MG tablet Take one pill daily and On 12/30/22 start taking 2 pills daily  Qty: 60 tablet, Refills: 0    Associated Diagnoses: Bipolar affective disorder, currently manic, severe, with psychotic features (H)      LORazepam (ATIVAN) 1 MG tablet Take 1 tablet in the morning tablet every morning, 1 tablet at 2 PM and 2 tablets at bedtime  Qty: 120 tablet, Refills: 1    Comments: Had to get new phone and it is not enrolled in [employer]  Associated Diagnoses: Bipolar affective disorder, currently manic, severe, with psychotic features (H)      lurasidone (LATUDA) 40 MG TABS tablet Take 1 tablet (40 mg) by mouth daily (with dinner)  Qty: 30 tablet, Refills: 1    Associated Diagnoses: Bipolar affective disorder, currently manic, severe, with psychotic features (H)      metFORMIN (GLUCOPHAGE) 500 MG tablet Take 1 tablet (500 mg) by mouth 2 times daily (with meals)  Qty: 60 tablet, Refills: 1    Associated Diagnoses: Bipolar affective disorder, currently manic, severe, with psychotic features (H)      propranolol (INDERAL) 10 MG tablet Take 3 tablets (30 mg) by mouth 3 times daily as needed (anxiety/akathisia)  Qty: 180 tablet, Refills: 1    Associated Diagnoses: Bipolar affective disorder, currently manic, severe, with psychotic features (H)      senna-docusate (SENOKOT-S/PERICOLACE) 8.6-50 MG tablet Take 1 tablet by mouth 2 times daily as needed for constipation    Associated Diagnoses: Bipolar affective disorder, currently manic,  "severe, with psychotic features (H)         STOP taking these medications       busPIRone (BUSPAR) 10 MG tablet Comments:   Reason for Stopping:         cefdinir (OMNICEF) 300 MG capsule Comments:   Reason for Stopping:         cloNIDine (CATAPRES) 0.1 MG tablet Comments:   Reason for Stopping:         hydrOXYzine (ATARAX) 25 MG tablet Comments:   Reason for Stopping:         risperiDONE (RISPERDAL) 0.5 MG tablet Comments:   Reason for Stopping:         TRINTELLIX 5 MG tablet Comments:   Reason for Stopping:                  MENTAL STATUS EXAM     Vitals: /50   Pulse 72   Temp 97.7  F (36.5  C) (Temporal)   Resp 16   Ht 1.626 m (5' 4\")   Wt 93.5 kg (206 lb 1.6 oz)   LMP  (LMP Unknown)   SpO2 99%   BMI 35.38 kg/m      Appearance: Alert, oriented, dressed in hospital scrubs, appears stated age   Attitude: Cooperative   Eye Contact: Good  Mood: \"Better\"  Affect: Full range of affect  Speech: Normal rate and rhythm   Psychomotor Behavior: No tremor, rigidity, or psychomotor abnormality   Thought Process: Logical, goal directed   Associations: No loose associations   Thought Content: Denies SI or plan. No SIB. Denies A/V hallucinations. No evidence of delusional thought.  Insight: Good  Judgment: Good  Oriented to: Person, place, and time  Attention Span and Concentration: Intact  Recent and Remote Memory: Intact  Language: English with appropriate syntax and vocabulary  Fund of Knowledge: Average  Muscle Strength and Tone: Grossly normal  Gait and Station: Grossly normal       DISCHARGE PLAN     1.  Education given regarding diagnostic and treatment options with risks, benefits and alternatives with adequate verbalization of understanding.  2.  Discharge to home with family upon detailed review of risk factors, patient amenable for release.   3.  Continue aforementioned medications and associated medication changes with follow-up by outpatient provider.  4.  Crisis management planning in place.    5.  " Nursing and  to review further discharge recommendations.   6.  Patient is being discharged to home with the following appointments as detailed below.           DISCHARGE SERVICES PROVIDED     40 minutes spent on discharge services, including:  Final examination of patient.  Review and discussion of hospital stay.  Instructions for continued outpatient care/goals.  Preparation of discharge records.  Preparation of medications refills and new prescriptions.  Preparation of applicable referral forms.        ATTESTATION     Rosemarie Davis NP    This note was created with help of Dragon dictation system. Grammatical / typing errors are not intentional.

## 2022-12-18 NOTE — PROGRESS NOTES
"[facility] PSYCHIATRY  PROGRESS NOTE     SUBJECTIVE   Ilana has now been off of the lithium for 2 days.  She appears to be doing very well.  She continues to take the Latuda with no side effects and was started on Lamictal which she is tolerating.  She states she is looking forward and is hoping to be discharged tomorrow back to her dad's house and states she feels much better than when she came in.  Her insight is very good.  She denies any further hallucinations she is not tearful she is sleeping well at night.     MEDICATIONS   Scheduled Meds:    clotrimazole   Topical BID     fluticasone  1 spray Both Nostrils Daily     lamoTRIgine  25 mg Oral Daily     loratadine  10 mg Oral Daily     LORazepam  1 mg Oral BID     LORazepam  2 mg Oral At Bedtime     lurasidone  40 mg Oral Daily with supper     metFORMIN  500 mg Oral Daily with supper     PRN Meds:.acetaminophen, alum & mag hydroxide-simethicone, artificial tears, diphenhydrAMINE, hydrocortisone (Perianal), hydrOXYzine, ibuprofen, melatonin, propranolol, senna-docusate, sodium chloride     ALLERGIES   Allergies   Allergen Reactions     Penicillins Unknown        MENTAL STATUS EXAM   Vitals: /50   Pulse 72   Temp 97.7  F (36.5  C) (Temporal)   Resp 16   Ht 1.626 m (5' 4\")   Wt 93.5 kg (206 lb 1.6 oz)   LMP  (LMP Unknown)   SpO2 99%   BMI 35.38 kg/m      Appearance: Alert, oriented, dressed in hospital scrubs, casually groomed  Attitude: Cooperative, pleasant  Speech: normal rate/rhythm   Mood: \"okay\", occasional periods of anxiety  Affect:  Full range  Psychomotor Behavior: No tremor, rigidity, No akathisia, or psychomotor retardation    Thought Process: Logical, linear, more goal oriented  Associations: No loose associations   Thought Content: Denies SI, HI  No psychotic or delusional thought noted.   Insight: Good  Judgment: Good  Oriented to: Person, place, and time  Attention Span and Concentration: Intact  Recent and Remote Memory: " Intact  Language: English with appropriate syntax and vocabulary  Fund of Knowledge: Average  Muscle Strength and Tone: Grossly normal  Gait and Station: Grossly normal       LABS   No results found for this or any previous visit (from the past 24 hour(s)).      IMPRESSION   This is a 37 year old female with a PMH of major depression and methamphetamine and alcohol use disorder previously admitted on this unit 8/2022 status post suicide attempt currently presenting with concerns for psychosis and catatonia with episodes in which the patient states she can hear, but cannot respond that started on 11/27/22. Patient was started on Clonidine and Risperdal 2 days prior to the onset. Will hold as they may be contributing factors. Patient notes worsening auditory hallucinations the past 2 months. Patient presents mute and uncooperative at times, question if volitional, certainly warrants continued monitoring.        DIAGNOSES   1. 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 Bipolar type 1 most recent mixed vs depressed with catatonia  3. Amphetamine use disorder, moderate       PLAN     Location: Unit 5   Legal Status: Court Hold  Her Preliminary Hearing is scheduled 12/09/2022 @ 10:30 am. Her commitment hearing is scheduled for 12/19/2022 @ 10:30 am.    Safety Assessment:    Behavioral Orders   Procedures     Code 1 - Restrict to Unit     Routine Programming     As clinically indicated     Status 15     Every 15 minutes.      PTA medications continued/changed:     -Did not continue Risperdal due to worsened symptoms when initiated while at the [facility]    New medications tried and stopped:     -- gabapentin 300mg tid - discontinued due to allergic reaction, rash    New medications initiated:     -Continue Ativan 2 mg 3 times a day - decreased to 1mg bid 12/10 and 2mg at hs  -Possibly increase Latuda in near future to watch for akathisia as she did experience some in  the past and the dose was decreased  -Continue lithium 750 mg QHS - increased to 900mg 12/10  -TSH BMP CMP (WNL) and Lithium level 0.5 on 12/10/2022  -Continue Metformin 250 mg BID - increase as tolerated    Today's Changes:     -Raise propranolol to 30 mg 3 times a day as needed  -Likely discharge tomorrow after court    Programming: Patient will be treated in a therapeutic milieu with appropriate individual and group therapies. Education will be provided on diagnoses, medications, and treatments.     Medical diagnoses:  Per medicine    Consult: None    Labs: Lithium level ordered for 12/16 AM    Anticipated LOS: TBD  Disposition: Likely home with family and services       ATTESTATION      April Sharon Davis NP

## 2022-12-18 NOTE — PLAN OF CARE
Problem: Adult Behavioral Health Plan of Care  Goal: Patient-Specific Goal (Individualization)  Description: Patient will be compliant with treatment team recommendations and medication administrations during hospitalization.  Patient will remain independent with ADLs daily.   Patient will sleep 6-8 hours per night.  Patient will eat at least 50% of meals daily.   Outcome: Progressing     Problem: Suicidal Behavior  Goal: Suicidal Behavior is Absent or Managed  Description: Patient will deny SI by discharge.   Patient will remain free from self harm/injury during hospitalization.   Patient will verbalize 3 coping skills by discharge.   Outcome: Progressing     Problem: Psychotic Signs/Symptoms  Goal: Improved Behavioral Control (Psychotic Signs/Symptoms)  Description: Patient will deny hallucinations by discharge.   Patient will remain in control of behaviors towards staff and peers during hospitalization.   Outcome: Progressing   Goal Outcome Evaluation:       Pt in her room writing in her journal. Calm and cooperative. Pleasant throughout assessment. No abnormal statements or behavior. No suicidal thoughts or plan voiced. No c/o pain at this time.  1720- requested and received Advil 800 mg po and Hydroxyzine 50 mg po for cramping pain and anxiety. Also given a list of her medications she takes here and teaching sheet for Metformin per request  2034- medicated with Propranolol 30 mg po per request. facesheet with insurance information faxed to [facility] per usman  Face to face end of shift report communicated to MINNIE Gee RN  12/18/2022  4:45 PM

## 2022-12-19 VITALS
HEIGHT: 64 IN | HEART RATE: 84 BPM | WEIGHT: 206.1 LBS | BODY MASS INDEX: 35.19 KG/M2 | TEMPERATURE: 98 F | DIASTOLIC BLOOD PRESSURE: 76 MMHG | RESPIRATION RATE: 14 BRPM | SYSTOLIC BLOOD PRESSURE: 122 MMHG | OXYGEN SATURATION: 98 %

## 2022-12-19 PROCEDURE — 250N000013 HC RX MED GY IP 250 OP 250 PS 637: Performed by: NURSE PRACTITIONER

## 2022-12-19 RX ORDER — LORAZEPAM 1 MG/1
TABLET ORAL
Qty: 120 TABLET | Refills: 1 | Status: SHIPPED | OUTPATIENT
Start: 2022-12-19 | End: 2023-12-12

## 2022-12-19 RX ORDER — LORAZEPAM 1 MG/1
1 TABLET ORAL 2 TIMES DAILY PRN
Status: DISCONTINUED | OUTPATIENT
Start: 2022-12-19 | End: 2022-12-19 | Stop reason: HOSPADM

## 2022-12-19 RX ADMIN — CLOTRIMAZOLE: 1 CREAM TOPICAL at 08:53

## 2022-12-19 RX ADMIN — ACETAMINOPHEN 650 MG: 325 TABLET, FILM COATED ORAL at 08:57

## 2022-12-19 RX ADMIN — LORAZEPAM 1 MG: 1 TABLET ORAL at 11:19

## 2022-12-19 RX ADMIN — PROPRANOLOL HYDROCHLORIDE 30 MG: 10 TABLET ORAL at 08:57

## 2022-12-19 RX ADMIN — LORATADINE 10 MG: 10 TABLET ORAL at 08:51

## 2022-12-19 RX ADMIN — HYDROXYZINE HYDROCHLORIDE 50 MG: 25 TABLET ORAL at 10:51

## 2022-12-19 RX ADMIN — FLUTICASONE PROPIONATE 1 SPRAY: 50 SPRAY, METERED NASAL at 08:55

## 2022-12-19 RX ADMIN — PROPRANOLOL HYDROCHLORIDE 30 MG: 10 TABLET ORAL at 14:07

## 2022-12-19 RX ADMIN — CARBOXYMETHYLCELLULOSE SODIUM 2 DROP: 5 SOLUTION/ DROPS OPHTHALMIC at 08:57

## 2022-12-19 RX ADMIN — LORAZEPAM 1 MG: 1 TABLET ORAL at 08:51

## 2022-12-19 RX ADMIN — LORAZEPAM 1 MG: 1 TABLET ORAL at 14:06

## 2022-12-19 RX ADMIN — HYDROCORTISONE: 25 CREAM TOPICAL at 08:55

## 2022-12-19 RX ADMIN — METFORMIN HYDROCHLORIDE 500 MG: 500 TABLET ORAL at 08:51

## 2022-12-19 RX ADMIN — LAMOTRIGINE 25 MG: 25 TABLET ORAL at 08:51

## 2022-12-19 ASSESSMENT — ACTIVITIES OF DAILY LIVING (ADL)
ADLS_ACUITY_SCORE: 28

## 2022-12-19 NOTE — PLAN OF CARE
Problem: Adult Behavioral Health Plan of Care  Goal: Patient-Specific Goal (Individualization)  Description: Patient will be compliant with treatment team recommendations and medication administrations during hospitalization.  Patient will remain independent with ADLs daily.   Patient will sleep 6-8 hours per night.  Patient will eat at least 50% of meals daily.   Outcome: Adequate for Care Transition     Goal Outcome Evaluation:       Pt is up on the unit and looking forward to discharge today. Increased anxiety t/o the day due to court process and discharging, although is in agreement with court decision. Tearful at times, new PRN order rcv'd from provider and pt was given Ativan at 1119 with noted temporary decrease in anxiety. Discussed concern that she is returning to same environment she will be trying to get away from, acknowledged that she wants to remain healthy. Denied SI, HI and hallucinations, denied physical complaint. Pt is well-groomed and dressed for discharge. PRN Propranolol and scheduled Ativan given, with VSS. Pt taking all scheduled meds and verbalized understanding of medication changes at discharge.     Problem: Suicidal Behavior  Goal: Suicidal Behavior is Absent or Managed  Description: Patient will deny SI by discharge.   Patient will remain free from self harm/injury during hospitalization.   Patient will verbalize 3 coping skills by discharge.   Outcome: Adequate for Care Transition    Problem: Psychotic Signs/Symptoms  Goal: Improved Behavioral Control (Psychotic Signs/Symptoms)  Description: Patient will deny hallucinations by discharge.   Patient will remain in control of behaviors towards staff and peers during hospitalization.   Outcome: Adequate for Care Transition    1530 - Face to face end of shift report to be communicated to oncoming shift RN.     Susi Caraballo RN  12/19/2022  2:58 PM    Discharge Note    Patient Discharged to home on 12/19/2022 2:58 PM via Taxi accompanied by  self.   Patient informed of discharge instructions in AVS. patient verbalizes understanding and denies having any questions pertaining to AVS. Patient stable at time of discharge. Patient denies SI, HI, and thoughts of self harm at time of discharge. All personal belongings returned to patient. Discharge prescriptions filled and picked up at [facility] sent home with pt. Psych evaluation, history and physical, AVS, and discharge summary faxed to next level of care by .     Susi Caraballo RN  12/19/2022  2:58 PM

## 2022-12-19 NOTE — PROGRESS NOTES
The patient has her commitment hearing this morning @ 10:30 am.     The patient had her court hearing this morning. She was granted a Stayed Order of Commitment.         SYDNEE called [facility] and spoke to Meli. SYDNEE asked if the patient's car was still located near their facility. Meli stated they will go look outside and see if the patient's car is still parked near their facility and called SYDNEE back.     Meli with [facility] called back and stated the patient's car is still there and covered with 2 ' of snow.     1:1 time with the patient.  No changes made to the discharge plan at this time.   See the AVS for follow up appointments and recommendations.     SYDNEE spoke to the patient. She stated she would like to discharge to her vehicle today which is located at [facility] after court this morning.     SYDNEE called [facility]. They will pick the patient up today between 1:00 pm and 1:30pm and transport her to [facility]. Provider and Nursing updated.       Melvina Mathur called and they will pick the patient up now @ 2:00 pm today. Nursing updated.        Condition:: History of MM, SCC, BCC and AKS. Please Describe Your Condition:: comes in for a chief complaint of History of MM, SCC, BCC and AKS. Patient has place on his neck that sticks off skin and gets irritated by clothing. Check face, chest, back, arms and legs.

## 2022-12-19 NOTE — PLAN OF CARE
Face to face shift report received from nurse. Rounding completed, pt observed.    Problem: Adult Behavioral Health Plan of Care  Goal: Patient-Specific Goal (Individualization)  Description: Patient will be compliant with treatment team recommendations and medication administrations during hospitalization.  Patient will remain independent with ADLs daily.   Patient will sleep 6-8 hours per night.  Patient will eat at least 50% of meals daily.   Outcome: Progressing   Pt has been in bed with eyes closed and regular respirations observed all night. Will continue to monitor. Patient slept 6.5 hours.     Face to face report will be communicated to oncoming RN.    Armaan Baltazar RN  12/19/2022

## 2022-12-19 NOTE — PROGRESS NOTES
Pt is discharging at the recommendation of the treatment team. Pt is discharging to home transported by [facility]. Pt denies having any thoughts of hurting themself or anyone else. Pt denies anxiety or depression. Pt has follow up with [facility] for Therapy and Medication management, and primary follow up with [facility] . Discharge instructions, including; demographic sheet, psychiatric evaluation, discharge summary, and AVS were faxed to these next level of care providers.

## 2022-12-19 NOTE — PROGRESS NOTES
"  Assessment & Plan     Auditory hallucinations  None reported since hospitalization.    Tinea pedis of both feet    BV (bacterial vaginosis)  History of.    Anogenital (venereal) warts    IUD (intrauterine device) in place    Psychosis, unspecified psychosis type (H)  Methamphetamine use disorder, mild, abuse (H)  Reports she is sober since hospitalization.    Nasal congestion  Refill needed.  - loratadine (CLARITIN) 10 MG tablet    Right facial swelling  CBC unremarkable. Will start her on a zpak per her request.  - CBC and Differential  - azithromycin (ZITHROMAX) 250 MG tablet  Dispense: 6 tablet; Refill: 0    Suicidal ideation  Panic disorder  Generalized anxiety disorder  Refill needed.  - hydrOXYzine (VISTARIL) 25 MG capsule    Cervical high risk human papillomavirus (HPV) DNA test positive  Encouraged annual recommended screenings.    Acute maxillary sinusitis, recurrence not specified  - azithromycin (ZITHROMAX) 250 MG tablet  Dispense: 6 tablet; Refill: 0    Ordering of each unique test  Prescription drug management  40 minutes spent on the date of the encounter doing chart review, history and exam, documentation and further activities per the note     MED REC REQUIRED  Post Medication Reconciliation Status:  Discharge medications reconciled, continue medications without change    BMI:   Estimated body mass index is 34.47 kg/m  as calculated from the following:    Height as of 11/28/22: 1.626 m (5' 4\").    Weight as of this encounter: 91.1 kg (200 lb 12.8 oz).       Work on weight loss  Regular exercise    Return in about 3 months (around 3/21/2023) for Lab Work, Mental Health Follow Up, Recheck.    Brenda Harkins NP  [facility] is a 37 year old, presenting for the following health issues:  Patient presents with:  Hospital F/U: [facility]  Establish Care    History of Present Illness       Mental Health Follow-up:  Patient presents to follow-up on Depression " & Anxiety.Patient's depression since last visit has been:  Medium  The patient is having other symptoms associated with depression.  Patient's anxiety since last visit has been:  Medium  The patient is having other symptoms associated with anxiety.  Any significant life events: job concerns, financial concerns, housing concerns and health concerns  Patient is feeling anxious or having panic attacks.  Patient has no concerns about alcohol or drug use.    She eats 2-3 servings of fruits and vegetables daily.She consumes 1 sweetened beverage(s) daily.She exercises with enough effort to increase her heart rate 30 to 60 minutes per day.  She exercises with enough effort to increase her heart rate 4 days per week.   She is taking medications regularly.    Today's PHQ-9         PHQ-9 Total Score: 13    PHQ-9 Q9 Thoughts of better off dead/self-harm past 2 weeks :   Not at all    How difficult have these problems made it for you to do your work, take care of things at home, or get along with other people: Somewhat difficult  Today's YASMANI-7 Score: 13     Hospital Follow-up Visit:    Hospital/Nursing Home/IP Rehab Facility: [facility]   Date of Admission: 11/27/2022  Date of Discharge: 12/19/22  Reason(s) for Admission: Catatonia    Was your hospitalization related to COVID-19? No   Problems taking medications regularly:  None  Medication changes since discharge: None  Problems adhering to non-medication therapy:      Summary of hospitalization:  [facility] information obtained and reviewed  Per Discharge Notes:  Presenting History: Ilana is a 37 year old female who presented to the [facility] Emergency Department on 11/27/2022 with concerns for catatonia. Psych consult service has been asked by Prabhu Turk PA-C to evaluate patient.  Per EMR pt presented to the ED from [facility] via EMS. Pt reportedly had mutism/staring with occasional inappropriate laughter, one episode lasted  "approx. 2 hours.  Patient interview (pt at bedside in [place] ED, tele health interview conducted via zoom from [facility] office in [place]): Pt A&Ox3, cooperative throughout interview. Tearful at times, intermittently giggles to self inappropriately. Pt reports she is here becuase \"I keep going in and out of consciousness. I'll close my eyes and won't be able to move\". Pt reports her first episode of this was today. Reports she has been having auditory hallucinations for approx. 2 months, hears the voices \"of people from my past that I hurt\". Occasionally has command hallucinations telling her to kill herself. Reports this last occured yesterday. Endorses SI without plan or intent \"fleeting thoughts that drive by like a car, but then I want to jump out of it\".   Pt reports she has been at [facility] since 11/22. She sought treatment there herself, as she was struggling with her mental health, especially auditory hallucinations. Reports stressors as being cheating on her  and getting a divorce, doing drugs and getting custody of her children taken away. Reports she has been sober from meth and alcohol for 52 days. Pt reports she feels like she needs inpatient hospitalization at this time \"clearly something is wrong and I need help\".   Pt currently endorses SI (without plan/intent). Denies HI, auditory/visual hallucinations.   Collateral (verbal consent from pt) [facility] Staff - Erica [phone]: Erica was with pt last night and the night prior. Pt was withdrawn, but seemed to be maintaining at that time. Per nursing note from incident today: catatonia-like symptoms. Pt was in room, not responding to peer, sitting up in bed with eyes closed, would not respond to nurse either. At some point in time, pt got up and layed her head down on kitchen table for at least 2 hours, remained fixed in that position. Staff contacted psych provider, who was unable to determine cause, thought possible reaction to " medications which were started 2 days ago (risperdal and clonidine). Provider then advised staff to call EMS for ED evaluation.   Erica discussed case with [facility] team - at this point in time they feel patient needs a higher level of care than they can provide, especially given her condition has not been improving, if anything it has been declining over the past several days.     MED REC REQUIRED{  Post Medication Reconciliation Status:  Discharge medications reconciled, continue medications without change    Diagnostic Tests/Treatments reviewed.  Follow up needed:   Other Healthcare Providers Involved in Patient s Care:         Specialist appointment - Mental Health provider/Therapy     Update since discharge: improved.  Medication manager - Shannan Poe, [facility]   Therapist - Dr. Mixon from [facility]  Goes weekly typically; has appoint jan 8, has not seen Shannan Poe since hospitalization but did see therapist today.    Feels ok, a little overwhelmed, some trouble sleeping    Eyes on fire  Hard line inside her right cheek, swelling of right cheek, recent ear infection, sinus pressure on right, started on claritin and flonase during hospital stay    Plan of care communicated with patient     Review of Systems   CONSTITUTIONAL:POSITIVE  for chills and sweats  EYES: POSITIVE for redness both and burning sensation  ENT/MOUTH: POSITIVE for Hx ear infections, Hx sinus infections, nasal congestion, rhinorrhea-purulent, sinus pressure and facial swelling  RESP: NEGATIVE for significant cough or SOB  CV: NEGATIVE for chest pain, palpitations or peripheral edema  PSYCHIATRIC: anxiety, depressed mood, HX anxiety, HX depression, HX panic attacks, HX suicide attempts, drug usage, insomnia, and obsessive thoughts  ROS otherwise negative      Objective    /76 (BP Location: Right arm, Patient Position: Sitting, Cuff Size: Adult Regular)   Pulse 105   Temp 97.8  F (36.6  C) (Temporal)   Resp 16   Wt  91.1 kg (200 lb 12.8 oz)   LMP 12/16/2022 (Exact Date)   SpO2 97%   BMI 34.47 kg/m    Body mass index is 34.47 kg/m .  Physical Exam   GENERAL: healthy, alert and no distress  EYES: Eyes grossly normal to inspection, PERRL and conjunctivae and sclerae normal  RESP: lungs clear to auscultation - no rales, rhonchi or wheezes  CV: regular rate and rhythm, normal S1 S2, no S3 or S4, no murmur, click or rub, no peripheral edema and peripheral pulses strong  ABDOMEN: soft, nontender, no hepatosplenomegaly, no masses and bowel sounds normal  MS: no gross musculoskeletal defects noted, no edema  SKIN: no suspicious lesions or rashes  NEURO: Normal strength and tone, mentation intact and speech normal  PSYCH: mentation appears normal, affect normal/bright    No results found for any visits on 12/21/22.

## 2022-12-21 ENCOUNTER — OFFICE VISIT (OUTPATIENT)
Dept: INTERNAL MEDICINE | Facility: OTHER | Age: 37
End: 2022-12-21
Attending: NURSE PRACTITIONER
Payer: COMMERCIAL

## 2022-12-21 VITALS
BODY MASS INDEX: 34.47 KG/M2 | WEIGHT: 200.8 LBS | DIASTOLIC BLOOD PRESSURE: 76 MMHG | TEMPERATURE: 97.8 F | SYSTOLIC BLOOD PRESSURE: 118 MMHG | RESPIRATION RATE: 16 BRPM | HEART RATE: 105 BPM | OXYGEN SATURATION: 97 %

## 2022-12-21 DIAGNOSIS — B96.89 BV (BACTERIAL VAGINOSIS): ICD-10-CM

## 2022-12-21 DIAGNOSIS — F15.10 METHAMPHETAMINE USE DISORDER, MILD, ABUSE (H): Chronic | ICD-10-CM

## 2022-12-21 DIAGNOSIS — Z97.5 IUD (INTRAUTERINE DEVICE) IN PLACE: Chronic | ICD-10-CM

## 2022-12-21 DIAGNOSIS — R22.0 RIGHT FACIAL SWELLING: ICD-10-CM

## 2022-12-21 DIAGNOSIS — J01.00 ACUTE MAXILLARY SINUSITIS, RECURRENCE NOT SPECIFIED: ICD-10-CM

## 2022-12-21 DIAGNOSIS — F41.0 PANIC DISORDER: ICD-10-CM

## 2022-12-21 DIAGNOSIS — B35.3 TINEA PEDIS OF BOTH FEET: ICD-10-CM

## 2022-12-21 DIAGNOSIS — R44.0 AUDITORY HALLUCINATIONS: ICD-10-CM

## 2022-12-21 DIAGNOSIS — N76.0 BV (BACTERIAL VAGINOSIS): ICD-10-CM

## 2022-12-21 DIAGNOSIS — F29 PSYCHOSIS, UNSPECIFIED PSYCHOSIS TYPE (H): ICD-10-CM

## 2022-12-21 DIAGNOSIS — F41.1 GENERALIZED ANXIETY DISORDER: ICD-10-CM

## 2022-12-21 DIAGNOSIS — R45.851 SUICIDAL IDEATION: ICD-10-CM

## 2022-12-21 DIAGNOSIS — A63.0 ANOGENITAL (VENEREAL) WARTS: Chronic | ICD-10-CM

## 2022-12-21 DIAGNOSIS — R87.810 CERVICAL HIGH RISK HUMAN PAPILLOMAVIRUS (HPV) DNA TEST POSITIVE: ICD-10-CM

## 2022-12-21 DIAGNOSIS — R09.81 NASAL CONGESTION: Primary | ICD-10-CM

## 2022-12-21 PROCEDURE — 99214 OFFICE O/P EST MOD 30 MIN: CPT | Performed by: NURSE PRACTITIONER

## 2022-12-21 RX ORDER — HYDROXYZINE PAMOATE 25 MG/1
25 CAPSULE ORAL 3 TIMES DAILY PRN
COMMUNITY
Start: 2022-12-21 | End: 2023-12-12

## 2022-12-21 RX ORDER — LORATADINE 10 MG/1
10 TABLET ORAL DAILY
COMMUNITY
Start: 2022-12-21 | End: 2023-12-12

## 2022-12-21 RX ORDER — AZITHROMYCIN 250 MG/1
TABLET, FILM COATED ORAL
Qty: 6 TABLET | Refills: 0 | Status: SHIPPED | OUTPATIENT
Start: 2022-12-21 | End: 2022-12-26

## 2022-12-21 RX ORDER — FLUTICASONE PROPIONATE 50 MCG
1 SPRAY, SUSPENSION (ML) NASAL DAILY
Qty: 15.8 ML | Refills: 1 | Status: SHIPPED | OUTPATIENT
Start: 2022-12-21 | End: 2022-12-22

## 2022-12-21 ASSESSMENT — ANXIETY QUESTIONNAIRES
7. FEELING AFRAID AS IF SOMETHING AWFUL MIGHT HAPPEN: SEVERAL DAYS
GAD7 TOTAL SCORE: 13
GAD7 TOTAL SCORE: 13
8. IF YOU CHECKED OFF ANY PROBLEMS, HOW DIFFICULT HAVE THESE MADE IT FOR YOU TO DO YOUR WORK, TAKE CARE OF THINGS AT HOME, OR GET ALONG WITH OTHER PEOPLE?: SOMEWHAT DIFFICULT
1. FEELING NERVOUS, ANXIOUS, OR ON EDGE: MORE THAN HALF THE DAYS
6. BECOMING EASILY ANNOYED OR IRRITABLE: SEVERAL DAYS
4. TROUBLE RELAXING: NEARLY EVERY DAY
IF YOU CHECKED OFF ANY PROBLEMS ON THIS QUESTIONNAIRE, HOW DIFFICULT HAVE THESE PROBLEMS MADE IT FOR YOU TO DO YOUR WORK, TAKE CARE OF THINGS AT HOME, OR GET ALONG WITH OTHER PEOPLE: SOMEWHAT DIFFICULT
5. BEING SO RESTLESS THAT IT IS HARD TO SIT STILL: SEVERAL DAYS
3. WORRYING TOO MUCH ABOUT DIFFERENT THINGS: MORE THAN HALF THE DAYS
2. NOT BEING ABLE TO STOP OR CONTROL WORRYING: NEARLY EVERY DAY
GAD7 TOTAL SCORE: 13
7. FEELING AFRAID AS IF SOMETHING AWFUL MIGHT HAPPEN: SEVERAL DAYS

## 2022-12-21 ASSESSMENT — PAIN SCALES - GENERAL: PAINLEVEL: MODERATE PAIN (5)

## 2022-12-21 ASSESSMENT — PATIENT HEALTH QUESTIONNAIRE - PHQ9
SUM OF ALL RESPONSES TO PHQ QUESTIONS 1-9: 13
SUM OF ALL RESPONSES TO PHQ QUESTIONS 1-9: 13
10. IF YOU CHECKED OFF ANY PROBLEMS, HOW DIFFICULT HAVE THESE PROBLEMS MADE IT FOR YOU TO DO YOUR WORK, TAKE CARE OF THINGS AT HOME, OR GET ALONG WITH OTHER PEOPLE: SOMEWHAT DIFFICULT

## 2022-12-21 NOTE — NURSING NOTE
"Chief Complaint   Patient presents with     [facility] F/U     [facility]       Initial /76 (BP Location: Right arm, Patient Position: Sitting, Cuff Size: Adult Regular)   Pulse 105   Temp 97.8  F (36.6  C) (Temporal)   Resp 16   Wt 91.1 kg (200 lb 12.8 oz)   LMP 12/16/2022 (Exact Date)   SpO2 97%   BMI 34.47 kg/m   Estimated body mass index is 34.47 kg/m  as calculated from the following:    Height as of 11/28/22: 1.626 m (5' 4\").    Weight as of this encounter: 91.1 kg (200 lb 12.8 oz).     Medication Reconciliation: complete      FOOD SECURITY SCREENING QUESTIONS:    The next two questions are to help us understand your food security.  If you are feeling you need any assistance in this area, we have resources available to support you today.    Hunger Vital Signs:  Within the past 12 months we worried whether our food would run out before we got money to buy more. Never  Within the past 12 months the food we bought just didn't last and we didn't have money to get more. Never        Advance care plan reviewed      Bianca Saldaña LPN on 12/21/2022 at 4:14 PM      "

## 2023-01-23 ENCOUNTER — TELEPHONE (OUTPATIENT)
Dept: INTERNAL MEDICINE | Facility: OTHER | Age: 38
End: 2023-01-23
Payer: COMMERCIAL

## 2023-01-23 ENCOUNTER — MYC MEDICAL ADVICE (OUTPATIENT)
Dept: INTERNAL MEDICINE | Facility: OTHER | Age: 38
End: 2023-01-23
Payer: COMMERCIAL

## 2023-01-23 NOTE — TELEPHONE ENCOUNTER
Please call the patient and find out who her therapist is.  The therapist will need to send me information regarding which test she would like to be completed and a release of information needs to be signed so that I could share those results with the requesting therapist.

## 2023-01-23 NOTE — TELEPHONE ENCOUNTER
Patients therapist wants patient to do a full work-up of labs to see if these are affecting her.     Please call patient back when orders are placed.         Thank you   Tiff Odell on 1/23/2023 at 11:52 AM

## 2023-01-25 NOTE — TELEPHONE ENCOUNTER
Duplicate. See [facility] medical advice encounter from 1/23/23. Provider recommended patient make an appointment. Patient has appointment scheduled for 3/22/23.    MAYKEL QURESHI RN on 1/25/2023 at 8:55 AM

## 2023-02-07 ENCOUNTER — OFFICE VISIT (OUTPATIENT)
Dept: FAMILY MEDICINE | Facility: OTHER | Age: 38
End: 2023-02-07
Payer: COMMERCIAL

## 2023-02-07 VITALS
BODY MASS INDEX: 34.31 KG/M2 | OXYGEN SATURATION: 97 % | HEART RATE: 82 BPM | DIASTOLIC BLOOD PRESSURE: 86 MMHG | RESPIRATION RATE: 16 BRPM | TEMPERATURE: 98.1 F | HEIGHT: 64 IN | WEIGHT: 201 LBS | SYSTOLIC BLOOD PRESSURE: 110 MMHG

## 2023-02-07 DIAGNOSIS — J02.9 VIRAL PHARYNGITIS: Primary | ICD-10-CM

## 2023-02-07 LAB — GROUP A STREP BY PCR: NOT DETECTED

## 2023-02-07 PROCEDURE — 99213 OFFICE O/P EST LOW 20 MIN: CPT

## 2023-02-07 PROCEDURE — G0463 HOSPITAL OUTPT CLINIC VISIT: HCPCS

## 2023-02-07 PROCEDURE — 87651 STREP A DNA AMP PROBE: CPT | Mod: ZL

## 2023-02-07 RX ORDER — ESZOPICLONE 2 MG/1
2 TABLET, FILM COATED ORAL AT BEDTIME
COMMUNITY
Start: 2023-01-17 | End: 2023-12-12

## 2023-02-07 RX ORDER — ALPRAZOLAM 0.5 MG
TABLET ORAL
COMMUNITY
Start: 2023-01-17 | End: 2023-12-12

## 2023-02-07 RX ORDER — HYDROXYZINE HYDROCHLORIDE 25 MG/1
TABLET, FILM COATED ORAL
COMMUNITY
Start: 2023-01-17 | End: 2023-12-12

## 2023-02-07 RX ORDER — DEXTROAMPHETAMINE SACCHARATE, AMPHETAMINE ASPARTATE, DEXTROAMPHETAMINE SULFATE AND AMPHETAMINE SULFATE 5; 5; 5; 5 MG/1; MG/1; MG/1; MG/1
20 TABLET ORAL EVERY MORNING
COMMUNITY
Start: 2023-01-18 | End: 2023-12-12

## 2023-02-07 ASSESSMENT — ENCOUNTER SYMPTOMS
MYALGIAS: 1
COUGH: 1
FATIGUE: 1
TROUBLE SWALLOWING: 0
FEVER: 0
CONSTIPATION: 0
ARTHRALGIAS: 1
ABDOMINAL PAIN: 0
ACTIVITY CHANGE: 0
VOMITING: 0
HEADACHES: 1
SHORTNESS OF BREATH: 0
DIARRHEA: 0
APPETITE CHANGE: 0
SORE THROAT: 1

## 2023-02-07 ASSESSMENT — PAIN SCALES - GENERAL: PAINLEVEL: MODERATE PAIN (5)

## 2023-02-07 ASSESSMENT — PATIENT HEALTH QUESTIONNAIRE - PHQ9: SUM OF ALL RESPONSES TO PHQ QUESTIONS 1-9: 0

## 2023-02-07 NOTE — LETTER
[facility]  [address]  [place] [address]  Phone: [phone]  Fax: [phone]    February 7, 2023        Ilana Aguilar  [address]  [place] [address]          To whom it may concern:    RE: Ilana Aguilar    Patient was seen and treated today at our clinic and missed work 02/06/23. Please excuse her from work today 02/07/23 and tomorrow 02/08/23 for her illness.    Please contact me for questions or concerns.      Sincerely,        SUNIL Hughes CNP

## 2023-02-07 NOTE — PROGRESS NOTES
Assessment & Plan   Ilana Aguilar is a 37 year old presenting for the following health issues:      ICD-10-CM    1. Viral pharyngitis  J02.9 Group A Streptococcus PCR Throat Swab        Patient tested negative for strep, suspect that this is viral in etiology due to symptom onset being 1 day ago.  Encourage patient to continue conservative treatment with Tylenol, ibuprofen, salt water gargles, over-the-counter throat lozenges.  Offered her viral testing for COVID, influenza, RSV, patient declines further testing at this time.  We will continue conservative treatment at home, patient verbalized understanding of condition and agrees to treatment plan.    Return if symptoms worsen or fail to improve.    SUNIL Hughes CNP  [facility]      Subjective   Ilana is a 37 year old accompanied by her spouse, presenting for the following health issues:  Generalized Body Aches and Pharyngitis      Pharyngitis   This is a new problem. The current episode started yesterday. There has been no fever. Associated symptoms include headaches and cough. Pertinent negatives include no diarrhea, no vomiting, no congestion, no ear discharge, no ear pain, no shortness of breath and no trouble swallowing ( pain). She has tried nothing for the symptoms. The treatment provided no relief.        Review of Systems   Constitutional: Positive for fatigue. Negative for activity change, appetite change and fever.   HENT: Positive for sore throat. Negative for congestion, ear discharge, ear pain and trouble swallowing ( pain).    Respiratory: Positive for cough. Negative for shortness of breath.    Gastrointestinal: Negative for abdominal pain, constipation, diarrhea and vomiting.   Musculoskeletal: Positive for arthralgias and myalgias.   Skin: Negative for rash.   Neurological: Positive for headaches.   All other systems reviewed and are negative.     Constitutional, HEENT, cardiovascular, pulmonary, gi and gu  "systems are negative, except as otherwise noted.      Objective    /86 (BP Location: Left arm, Patient Position: Sitting, Cuff Size: Adult Regular)   Pulse 82   Temp 98.1  F (36.7  C) (Tympanic)   Resp 16   Ht 1.613 m (5' 3.5\")   Wt 91.2 kg (201 lb)   LMP 01/17/2023 (Exact Date)   SpO2 97%   BMI 35.05 kg/m    Body mass index is 35.05 kg/m .  Physical Exam  Vitals reviewed.   Constitutional:       General: She is not in acute distress.     Appearance: She is normal weight.   HENT:      Head: Normocephalic.      Jaw: No trismus.      Right Ear: Tympanic membrane and ear canal normal.      Left Ear: Tympanic membrane and ear canal normal.      Nose: No congestion.      Mouth/Throat:      Mouth: Mucous membranes are moist.      Pharynx: Oropharynx is clear. Posterior oropharyngeal erythema present. No oropharyngeal exudate.   Eyes:      Pupils: Pupils are equal, round, and reactive to light.   Cardiovascular:      Rate and Rhythm: Normal rate and regular rhythm.      Pulses: Normal pulses.      Heart sounds: No murmur heard.  Pulmonary:      Effort: Pulmonary effort is normal. No respiratory distress.      Breath sounds: Normal breath sounds. No rhonchi.   Musculoskeletal:         General: No swelling or deformity. Normal range of motion.      Cervical back: Normal range of motion.   Lymphadenopathy:      Cervical: No cervical adenopathy.   Skin:     General: Skin is warm and dry.   Neurological:      Mental Status: She is alert.        Results for orders placed or performed in visit on 02/07/23   Group A Streptococcus PCR Throat Swab     Status: Normal    Specimen: Throat; Swab   Result Value Ref Range    Group A strep by PCR Not Detected Not Detected    Narrative    The Xpert Xpress Strep A test, performed on the [employer]  Instrument Systems, is a rapid, qualitative in vitro diagnostic test for the detection of Streptococcus pyogenes (Group A ß-hemolytic Streptococcus, Strep A) in throat swab specimens " from patients with signs and symptoms of pharyngitis. The Xpert Xpress Strep A test can be used as an aid in the diagnosis of Group A Streptococcal pharyngitis. The assay is not intended to monitor treatment for Group A Streptococcus infections. The Xpert Xpress Strep A test utilizes an automated real-time polymerase chain reaction (PCR) to detect Streptococcus pyogenes DNA.

## 2023-02-07 NOTE — PATIENT INSTRUCTIONS
If strep is positive:  Recommend taking entire course of antibiotic even if feeling better prior to this. You may take a daily probiotic while on this medication.  Recommend changing toothbrush on day 2.    You will be contagious for 24 hour after starting antibiotic.   Recommend alternating Tylenol and ibuprofen every 4-6 hours as needed.  Also recommend salt water gargles, humidifier, throat lozenges if old enough not to be a choking hazard, warm honey if greater than 12 months in age, other home remedies as needed.   If changing or worsening symptoms such as: Worsening fevers, pain, inability to handle own secretions, etc., recommend follow-up.      symptomatic treatments:    Symptoms due to virus. No antibiotic is needed at this time. Symptoms typically worse on days 3-4 and then begin improving each day - but can last up to 1 or 2 weeks.If symptoms begin worsening or fail to improve after 10 days, return to clinic for reevaluation.     Monitor for any fevers or chills. Return in 7-10 days if not feeling better. Please call clinic with any questions or concerns. Please take in a lot of fluids and get rest.     Push oral hydration - prevent dehydration.  Urine should be clear or light yellow.      Mucinex DM or Sudafed- helps thin the phlegm and settle the cough, without causing drowsiness.   -Maximum strength (buy the generic)      May take tylenol as needed for sore throat. Treat symptomatically with warm salt water gargles. Lozenges, Tylenol, Advil orAleve as needed. Frequent swallows of cool liquid. Oatmeal coats the throat and some patients find it soothes the pain.     Follow up in clinic if:   You have a fever of at least 101 F or 38.4 C   Your throat pain is severe or does not start toimprove within 5 to 7 days  Call 9-1-1 or go to the emergency room if you:   Have trouble breathing   Are drooling because you cannot swallow your saliva   Have swelling of the neck or tongue   Cannot move your neck or have  trouble opening your mouth

## 2023-02-07 NOTE — NURSING NOTE
"Pt presents to  for body aches and sore throat x2 days. Pt states she was taking Tylenol PRN - last dose was yesterday.      Chief Complaint   Patient presents with     Generalized Body Aches     Pharyngitis       FOOD SECURITY SCREENING QUESTIONS  Hunger Vital Signs:  Within the past 12 months we worried whether our food would run out before we got money to buy more. Never  Within the past 12 months the food we bought just didn't last and we didn't have money to get more. Never  Matilde Dearmon 2/7/2023 3:50 PM      Initial /86 (BP Location: Left arm, Patient Position: Sitting, Cuff Size: Adult Regular)   Pulse 82   Temp 98.1  F (36.7  C) (Tympanic)   Resp 16   Ht 1.613 m (5' 3.5\")   Wt 91.2 kg (201 lb)   LMP 01/17/2023 (Exact Date)   SpO2 97%   BMI 35.05 kg/m   Estimated body mass index is 35.05 kg/m  as calculated from the following:    Height as of this encounter: 1.613 m (5' 3.5\").    Weight as of this encounter: 91.2 kg (201 lb).  Medication Reconciliation: complete    Matilde Dearmon    "

## 2023-02-17 DIAGNOSIS — F31.2 BIPOLAR AFFECTIVE DISORDER, CURRENTLY MANIC, SEVERE, WITH PSYCHOTIC FEATURES (H): ICD-10-CM

## 2023-02-17 NOTE — TELEPHONE ENCOUNTER
LATUDA 40MG DAILY      Last Written Prescription Date:  12-18-22  Last Fill Quantity: 30,   # refills: 1  Last Office Visit: 12-21-22  Future Office visit:    Next 5 appointments (look out 90 days)    Mar 22, 2023  9:20 AM  Office Visit with Brenda Harkins NP  [facility] ([facility] ) [address]  [place] [address]  [phone]           Routing refill request to provider for review/approval because:  NON [place] PATIENT

## 2023-02-21 RX ORDER — LURASIDONE HYDROCHLORIDE 40 MG/1
40 TABLET, FILM COATED ORAL
Qty: 30 TABLET | Refills: 0 | Status: SHIPPED | OUTPATIENT
Start: 2023-02-21 | End: 2023-12-12

## 2023-02-21 NOTE — TELEPHONE ENCOUNTER
Last prescribed by Rosemarie Davis NP at [facility].    LOV: 12/21/22    Return in about 3 months (around 3/21/2023) for Lab Work, Mental Health Follow Up, Recheck.     Next 5 appointments (look out 90 days)    Mar 22, 2023  9:20 AM  Office Visit with rBenda Harkins NP  [facility] ([facility] ) [address]  [place] [address]  [phone]        Matilde Castro RN .............. 2/21/2023  4:23 PM

## 2023-04-04 ASSESSMENT — PATIENT HEALTH QUESTIONNAIRE - PHQ9
SUM OF ALL RESPONSES TO PHQ QUESTIONS 1-9: 6
10. IF YOU CHECKED OFF ANY PROBLEMS, HOW DIFFICULT HAVE THESE PROBLEMS MADE IT FOR YOU TO DO YOUR WORK, TAKE CARE OF THINGS AT HOME, OR GET ALONG WITH OTHER PEOPLE: SOMEWHAT DIFFICULT
SUM OF ALL RESPONSES TO PHQ QUESTIONS 1-9: 6

## 2023-04-05 ENCOUNTER — OFFICE VISIT (OUTPATIENT)
Dept: OBGYN | Facility: OTHER | Age: 38
End: 2023-04-05
Payer: COMMERCIAL

## 2023-04-05 VITALS
BODY MASS INDEX: 35.05 KG/M2 | HEART RATE: 88 BPM | SYSTOLIC BLOOD PRESSURE: 122 MMHG | WEIGHT: 201 LBS | DIASTOLIC BLOOD PRESSURE: 80 MMHG | OXYGEN SATURATION: 96 %

## 2023-04-05 DIAGNOSIS — Z30.432 ENCOUNTER FOR IUD REMOVAL: Primary | ICD-10-CM

## 2023-04-05 PROCEDURE — 99213 OFFICE O/P EST LOW 20 MIN: CPT | Mod: 25

## 2023-04-05 PROCEDURE — 58301 REMOVE INTRAUTERINE DEVICE: CPT

## 2023-04-05 ASSESSMENT — PAIN SCALES - GENERAL: PAINLEVEL: NO PAIN (0)

## 2023-04-05 NOTE — NURSING NOTE
Patient presents to clinic for IUD removal, patient states no concerns.  Consent signed  /80   Pulse 88   Wt 91.2 kg (201 lb)   LMP 01/17/2023 (Exact Date)   SpO2 96%   BMI 35.05 kg/m    Cat Ocampo LPN on 4/5/2023 at 9:06 AM

## 2023-04-05 NOTE — PROGRESS NOTES
Follow-Up Visit    S: Ms. Ilana Aguilar is a 37 year old  here for IUD removal. She had a Copper placed on 2017. She reports that she is ready to have a baby. She has a history of IUD being dislodged and difficult to remove. She will need to connect with mental health provider for medication management prior to pregnancy.     O:  /80   Pulse 88   Wt 91.2 kg (201 lb)   LMP 2023 (Exact Date)   SpO2 96%   BMI 35.05 kg/m    Gen: Well-appearing, NAD  Pulm: nonlabored  Psych: appropriate mood and affect    Pelvic:  Normal appearing external female genitalia. Normal hair distribution. Vagina is without lesions. Moderate white discharge. Cervix normal, IUD strings appear appropriate length    Procedure: She signed consents for IUD removal in the office today. She was assisted into a lithotomy position and the speculum was gently inserted to provide visualization of the cervix. The IUD strings were noted to be at the external os and gently grasped with a ring forcep. The IUD was removed and inspected, found to be intact. She tolerated the procedure well.    A/P:  Ms. Ilana Aguilar is a 37 year old  here for IUD removal. No concerns with removal. She will follow up yearly and PRN. he will need to connect with mental health provider for medication management prior to pregnancy.    Emi BARBER, JIMP-C  2023 9:17 AM

## 2023-05-06 ENCOUNTER — HEALTH MAINTENANCE LETTER (OUTPATIENT)
Age: 38
End: 2023-05-06

## 2023-11-28 ENCOUNTER — OFFICE VISIT (OUTPATIENT)
Dept: FAMILY MEDICINE | Facility: OTHER | Age: 38
End: 2023-11-28
Payer: COMMERCIAL

## 2023-11-28 VITALS
SYSTOLIC BLOOD PRESSURE: 130 MMHG | HEIGHT: 64 IN | DIASTOLIC BLOOD PRESSURE: 90 MMHG | BODY MASS INDEX: 33.67 KG/M2 | RESPIRATION RATE: 16 BRPM | TEMPERATURE: 97.4 F | OXYGEN SATURATION: 98 % | HEART RATE: 83 BPM | WEIGHT: 197.2 LBS

## 2023-11-28 DIAGNOSIS — R09.81 NASAL CONGESTION: ICD-10-CM

## 2023-11-28 DIAGNOSIS — H65.01 NON-RECURRENT ACUTE SEROUS OTITIS MEDIA OF RIGHT EAR: Primary | ICD-10-CM

## 2023-11-28 PROCEDURE — 99213 OFFICE O/P EST LOW 20 MIN: CPT

## 2023-11-28 PROCEDURE — G0463 HOSPITAL OUTPT CLINIC VISIT: HCPCS

## 2023-11-28 RX ORDER — CEFDINIR 300 MG/1
300 CAPSULE ORAL 2 TIMES DAILY
Qty: 14 CAPSULE | Refills: 0 | Status: SHIPPED | OUTPATIENT
Start: 2023-11-28 | End: 2023-12-05

## 2023-11-28 RX ORDER — FLUTICASONE PROPIONATE 50 MCG
1 SPRAY, SUSPENSION (ML) NASAL DAILY
Qty: 15.8 ML | Refills: 0 | Status: SHIPPED | OUTPATIENT
Start: 2023-11-28 | End: 2023-12-12

## 2023-11-28 ASSESSMENT — PAIN SCALES - GENERAL: PAINLEVEL: NO PAIN (0)

## 2023-11-28 NOTE — PROGRESS NOTES
ASSESSMENT/PLAN:    I have reviewed the nursing notes.  I have reviewed the findings, diagnosis, plan and need for follow up with the patient.    1. Non-recurrent acute serous otitis media of right ear  - cefdinir (OMNICEF) 300 MG capsule; Take 1 capsule (300 mg) by mouth 2 times daily for 7 days  Dispense: 14 capsule; Refill: 0    Physical exam and symptoms consistent with right otitis media.  Will treat with Omnicef twice a day for 7 days.  Patient does have an allergy to penicillins and informed her that she is at low risk of having a reaction to the Omnicef based on her allergy to penicillins.  May use Tylenol and ibuprofen as needed.    2. Nasal congestion  - fluticasone (FLONASE) 50 MCG/ACT nasal spray; Spray 1 spray into both nostrils daily  Dispense: 15.8 mL; Refill: 0    Patient has been having nasal congestion.  Advised that she can try an over-the-counter antihistamine such as Claritin or Zyrtec.  Provided patient with a prescription for fluticasone. Discussed symptomatic treatment - Encouraged fluids, elevation, humidifier, sinus rinse/netti pot, rest, etc.     Discussed warning signs/symptoms indicative of need to f/u    Follow up if symptoms persist or worsen or concerns    I explained my diagnostic considerations and recommendations to the patient, who voiced understanding and agreement with the treatment plan. All questions were answered. We discussed potential side effects of any prescribed or recommended therapies, as well as expectations for response to treatments.    SUNIL Cabrales CNP  11/28/2023  11:23 AM    HPI:    Ilana Aguilar is a 38 year old female  who presents to [facility] today for concerns of ear pain    bilateral ear pain x 1 week duration.     Presence of the following:   No fevers or chills.   No sore throat/pharyngitis/tonsillitis.   Yes allergy/URI Symptoms  Yes Muffled Sounds/Change in Hearing  Yes Sensation of Fullness in Ear(s)  Yes Ringing in Ears/Tinnitus  No  Balance Changes  Yes Dizziness  No Headache   No Ear Drainage  Additional Symptoms: No  Denies persistent hearing loss, foul smelling odor from ear, changes in vision, nausea, vomiting, diarrhea, chest pain, shortness of breath.     No Recent swimming/hot tub  No submerging of head in shower/bathtub.     Yes Recent URI or other illness  History of otitis media: Yes  History of HEENT surgery (PE tubes, tonsillectomy/adenoidectomy, etc.): No  Recent Course of ABX: No    Treatments Tried: none  Prior History of Similar Symptoms: Yes    PCP - none    Past Medical History:   Diagnosis Date    Acute vaginitis     History of bacterial vaginosis    Anogenital (venereal) warts     No Comments Provided    Encounter for insertion of intrauterine contraceptive device     14,Paragard Lot# [id number] Exp 2020    History of ETOH abuse     History of methamphetamine use     History of suicide attempt     Injury of left ankle     Left ankle - denied per patient    Personal history of other medical treatment (CODE)     ,  ( one AB at age 20)    Personal history of urinary calculi     No Comments Provided    Personal history of urinary infection     No Comments Provided    Scoliosis     No Comments Provided    Social phobia     No Comments Provided     Past Surgical History:   Procedure Laterality Date    COLPOSCOPY       Social History     Tobacco Use    Smoking status: Never     Passive exposure: Never    Smokeless tobacco: Never   Substance Use Topics    Alcohol use: No     Alcohol/week: 0.0 standard drinks of alcohol     Comment: social     Current Outpatient Medications   Medication Sig Dispense Refill    ALPRAZolam (XANAX) 0.5 MG tablet TAKE 1/2 TO 1 TABLET BY MOUTH 3 TIMES WEEKLY AS NEEDED FOR PANIC ATTACKS. MUST LAST 30 DAYS      amphetamine-dextroamphetamine (ADDERALL) 20 MG tablet Take 20 mg by mouth every morning      carboxymethylcellulose PF (REFRESH PLUS) 0.5 % ophthalmic solution Place 2 drops into both  eyes 4 times daily as needed for dry eyes      clotrimazole (LOTRIMIN) 1 % external cream Apply topically 2 times daily 14 g 0    eszopiclone (LUNESTA) 2 MG tablet Take 2 mg by mouth At Bedtime      fluticasone (FLONASE) 50 MCG/ACT nasal spray Spray 1 spray into both nostrils daily 48 g 0    hydrOXYzine (ATARAX) 25 MG tablet TAKE 1 TABLET BY MOUTH EVERY MORNING AND TAKE 2 TABLETS EVERY NIGHT AT BEDTIME      hydrOXYzine (VISTARIL) 25 MG capsule Take 1 capsule (25 mg) by mouth 3 times daily as needed for anxiety - patient reported      ibuprofen (ADVIL/MOTRIN) 800 MG tablet Take 1 tablet (800 mg) by mouth 3 times daily as needed for inflammatory pain      lamoTRIgine (LAMICTAL) 25 MG tablet Take one pill daily and On 12/30/22 start taking 2 pills daily 60 tablet 0    loratadine (CLARITIN) 10 MG tablet Take 1 tablet (10 mg) by mouth daily      LORazepam (ATIVAN) 1 MG tablet Take 1 tablet in the morning tablet every morning, 1 tablet at 2 PM and 2 tablets at bedtime 120 tablet 1    lurasidone (LATUDA) 40 MG TABS tablet Take 1 tablet (40 mg) by mouth daily (with dinner) 30 tablet 0    metFORMIN (GLUCOPHAGE) 500 MG tablet Take 1 tablet (500 mg) by mouth 2 times daily (with meals) 60 tablet 1    propranolol (INDERAL) 10 MG tablet Take 3 tablets (30 mg) by mouth 3 times daily as needed (anxiety/akathisia) 180 tablet 1    senna-docusate (SENOKOT-S/PERICOLACE) 8.6-50 MG tablet Take 1 tablet by mouth 2 times daily as needed for constipation       Allergies   Allergen Reactions    Gabapentin      Rash all over body    Penicillins Unknown    Risperdal [Risperidone] Other (See Comments)     Induced/severely worsened catatonia     Past medical history, past surgical history, current medications and allergies reviewed and accurate to the best of my knowledge.      ROS:  Refer to HPI    BP (!) 136/96 (BP Location: Left arm, Patient Position: Sitting, Cuff Size: Adult Regular)   Pulse 83   Temp 97.4  F (36.3  C) (Temporal)   Resp  "16    1.619 m (5' 3.75\")   Wt 89.4 kg (197 lb 3.2 oz)   LMP 10/30/2023 (Within Days)   SpO2 98%   BMI 34.12 kg/m      EXAM:  General Appearance: Well appearing 38 year old female, appropriate appearance for age. No acute distress   Ears: Left TM intact, translucent with bony landmarks appreciated, no erythema, no effusion, no bulging, no purulence.  Right TM intact, mild erythema, moderate effusion, bulging, and purulence.  Left auditory canal clear.  Right auditory canal clear.  Normal external ears, non tender.  Eyes: conjunctivae normal without erythema or irritation, corneas clear, no drainage or crusting, no eyelid swelling, pupils equal   Oropharynx: moist mucous membranes, posterior pharynx without erythema, tonsils symmetric and 1+, no erythema, no exudates or petechiae, no post nasal drip seen, no trismus, voice clear.    Nose:  Bilateral nares: no erythema, no edema, no drainage or congestion   Neck: supple without adenopathy  Respiratory: normal chest wall and respirations.  Normal effort.  Clear to auscultation bilaterally, no wheezing, crackles or rhonchi.  No increased work of breathing.  No cough appreciated.  Cardiac: RRR with no murmurs  Neuro: Alert and oriented to person, place, and time.    Psychological: normal affect, alert, oriented, and pleasant.       "

## 2023-11-28 NOTE — NURSING NOTE
"Pt presents to  for R ear problem x1.5wks. Pt states ear is draining and she cannot hear well out of it. Pt does state there is some discomfort in L ear, but not as bad as right.  Pt requesting to talk about birth control, I did let her know this could be referred to a primary provider.    Chief Complaint   Patient presents with    Ear Problem     Bilat       FOOD SECURITY SCREENING QUESTIONS  Hunger Vital Signs:  Within the past 12 months we worried whether our food would run out before we got money to buy more. Never  Within the past 12 months the food we bought just didn't last and we didn't have money to get more. Never  Matilde Dearmon 11/28/2023 11:11 AM      Initial BP (!) 136/96 (BP Location: Left arm, Patient Position: Sitting, Cuff Size: Adult Regular)   Pulse 83   Temp 97.4  F (36.3  C) (Temporal)   Resp 16   Ht 1.619 m (5' 3.75\")   Wt 89.4 kg (197 lb 3.2 oz)   LMP 10/30/2023 (Within Days)   SpO2 98%   BMI 34.12 kg/m   Estimated body mass index is 34.12 kg/m  as calculated from the following:    Height as of this encounter: 1.619 m (5' 3.75\").    Weight as of this encounter: 89.4 kg (197 lb 3.2 oz).  Medication Reconciliation: complete    Matilde Dearmon  "

## 2023-12-12 ENCOUNTER — OFFICE VISIT (OUTPATIENT)
Dept: INTERNAL MEDICINE | Facility: OTHER | Age: 38
End: 2023-12-12
Attending: INTERNAL MEDICINE
Payer: COMMERCIAL

## 2023-12-12 VITALS
BODY MASS INDEX: 31.77 KG/M2 | DIASTOLIC BLOOD PRESSURE: 66 MMHG | SYSTOLIC BLOOD PRESSURE: 127 MMHG | OXYGEN SATURATION: 98 % | RESPIRATION RATE: 20 BRPM | HEIGHT: 66 IN | WEIGHT: 197.7 LBS | HEART RATE: 81 BPM | TEMPERATURE: 98.4 F

## 2023-12-12 DIAGNOSIS — H60.391 INFECTIVE OTITIS EXTERNA, RIGHT: Primary | ICD-10-CM

## 2023-12-12 DIAGNOSIS — H66.004 RECURRENT ACUTE SUPPURATIVE OTITIS MEDIA OF RIGHT EAR WITHOUT SPONTANEOUS RUPTURE OF TYMPANIC MEMBRANE: ICD-10-CM

## 2023-12-12 PROCEDURE — 99213 OFFICE O/P EST LOW 20 MIN: CPT | Performed by: INTERNAL MEDICINE

## 2023-12-12 PROCEDURE — G0463 HOSPITAL OUTPT CLINIC VISIT: HCPCS

## 2023-12-12 RX ORDER — CIPROFLOXACIN AND DEXAMETHASONE 3; 1 MG/ML; MG/ML
4 SUSPENSION/ DROPS AURICULAR (OTIC) 2 TIMES DAILY
Qty: 7.5 ML | Refills: 0 | Status: SHIPPED | OUTPATIENT
Start: 2023-12-12

## 2023-12-12 RX ORDER — AZITHROMYCIN 250 MG/1
TABLET, FILM COATED ORAL
Qty: 6 TABLET | Refills: 0 | Status: SHIPPED | OUTPATIENT
Start: 2023-12-12 | End: 2023-12-17

## 2023-12-12 ASSESSMENT — ENCOUNTER SYMPTOMS
VOMITING: 0
COUGH: 0
ABDOMINAL PAIN: 0
RHINORRHEA: 0
SORE THROAT: 0
DIARRHEA: 0

## 2023-12-12 ASSESSMENT — PAIN SCALES - GENERAL: PAINLEVEL: NO PAIN (0)

## 2023-12-12 NOTE — NURSING NOTE
"Chief Complaint   Patient presents with    Right Ear green drainage,      Completed anti-biotics and still continues to drain       Initial /66 (BP Location: Right arm, Patient Position: Sitting, Cuff Size: Adult Regular)   Pulse 81   Temp 98.4  F (36.9  C) (Temporal)   Resp 20   Ht 1.67 m (5' 5.75\")   Wt 89.7 kg (197 lb 11.2 oz)   LMP 11/30/2023 (Within Days)   SpO2 98%   Breastfeeding No   BMI 32.15 kg/m   Estimated body mass index is 32.15 kg/m  as calculated from the following:    Height as of this encounter: 1.67 m (5' 5.75\").    Weight as of this encounter: 89.7 kg (197 lb 11.2 oz).  Medication Review: complete    The next two questions are to help us understand your food security.  If you are feeling you need any assistance in this area, we have resources available to support you today.           No data to display                  Health Care Directive:  Patient does not have a Health Care Directive or Living Will: Discussed advance care planning with patient; however, patient declined at this time.    Juan José Cochran      "

## 2023-12-12 NOTE — PROGRESS NOTES
Assessment & Plan     ICD-10-CM    1. Infective otitis externa, right  H60.391 ciprofloxacin-dexAMETHasone (CIPRODEX) 0.3-0.1 % otic suspension      2. Recurrent acute suppurative otitis media of right ear without spontaneous rupture of tympanic membrane  H66.004 azithromycin (ZITHROMAX) 250 MG tablet        Patient presents for recurrent/persistent right ear infection.  Was on Omnicef 3 mg twice daily for 7 days.  This finished up on 12/5.  Still having significant amount of yellowish purulent drainage from the right ear.  Typically gets eardrops plus oral medications.  Has this once or twice per year.    Has never had PE tubes, she did try to go ahead with them at 1 point in time but decided not to.  She is not entirely sure why she keeps getting recurrent ear infections especially otitis externa.    Recommend that she try some swimming pool eardrops to dry the ear canals, otherwise something similar to this to help keep the canals dry so that they are not getting infection and otitis externa.    At this point concerned about possible Pseudomonas given the yellow purulent material that is draining from her right ear.  Prescription for Ciprodex sent to pharmacy.    Given her otitis media, prescription for Zithromax sent to pharmacy as well.  Follow-up as needed.    Return for follow-up as needed for new or worsening symptoms, Appointments: [phone].    Micah Jones MD  [facility]   Ilana is a 38 year old, presenting for the following health issues:  Right Ear green drainage,  (Completed anti-biotics and still continues to drain)        12/12/2023    12:45 PM   Additional Questions   Roomed by Juan José Rebollar LPN   Accompanied by n/a       Ear Problem  This is a recurrent problem. The current episode started more than 1 week ago. There is pain in the right ear. The problem occurs constantly. The problem has not changed since onset.There has been no fever. The pain is at a  "severity of 1/10. The pain is mild. Associated symptoms include ear discharge and hearing loss. Pertinent negatives include no rhinorrhea, no sore throat, no abdominal pain, no diarrhea, no vomiting, no cough and no rash. Her past medical history is significant for chronic ear infection. Her past medical history does not include hearing loss or tympanostomy tube.                  Review of Systems   HENT:  Positive for ear discharge, ear pain and hearing loss. Negative for rhinorrhea and sore throat.    Respiratory:  Negative for cough.    Gastrointestinal:  Negative for abdominal pain, diarrhea and vomiting.   Skin:  Negative for rash.          Objective    /66 (BP Location: Right arm, Patient Position: Sitting, Cuff Size: Adult Regular)   Pulse 81   Temp 98.4  F (36.9  C) (Temporal)   Resp 20   Ht 1.67 m (5' 5.75\")   Wt 89.7 kg (197 lb 11.2 oz)   LMP 11/30/2023 (Within Days)   SpO2 98%   Breastfeeding No   BMI 32.15 kg/m    Body mass index is 32.15 kg/m .  Physical Exam  Constitutional:       Appearance: Normal appearance.   HENT:      Left Ear: Tympanic membrane, ear canal and external ear normal. There is no impacted cerumen.      Ears:      Comments: Right ear -draining, irritated external auditory canal with greenish colored thick material.  Canal itself is very red and swollen.  Crusting irritation external to the right ear as well.  TM is bright red.     Mouth/Throat:      Pharynx: Oropharynx is clear. No oropharyngeal exudate or posterior oropharyngeal erythema.   Eyes:      General: No scleral icterus.     Conjunctiva/sclera: Conjunctivae normal.   Cardiovascular:      Rate and Rhythm: Normal rate.   Pulmonary:      Effort: Pulmonary effort is normal.   Lymphadenopathy:      Cervical: No cervical adenopathy.   Neurological:      Mental Status: She is alert.                        "

## 2023-12-12 NOTE — PATIENT INSTRUCTIONS
Infective otitis externa, right    START:   - ciprofloxacin-dexAMETHasone (CIPRODEX) 0.3-0.1 % otic suspension; Place 4 drops into the right ear 2 times daily    Recurrent acute suppurative otitis media of right ear without spontaneous rupture of tympanic membrane    START:   - azithromycin (ZITHROMAX) 250 MG tablet; Take 2 tablets (500 mg) by mouth daily for 1 day, THEN 1 tablet (250 mg) daily for 4 days.        Return as needed for follow-up for new / worsening symptoms.    Clinic : [phone]  Appointment line: [phone]

## 2023-12-13 ENCOUNTER — OFFICE VISIT (OUTPATIENT)
Dept: OBGYN | Facility: OTHER | Age: 38
End: 2023-12-13
Payer: COMMERCIAL

## 2023-12-13 VITALS
HEART RATE: 86 BPM | SYSTOLIC BLOOD PRESSURE: 126 MMHG | WEIGHT: 196 LBS | BODY MASS INDEX: 31.88 KG/M2 | DIASTOLIC BLOOD PRESSURE: 82 MMHG

## 2023-12-13 DIAGNOSIS — Z30.430 ENCOUNTER FOR INTRAUTERINE DEVICE PLACEMENT: Primary | ICD-10-CM

## 2023-12-13 DIAGNOSIS — Z12.4 ENCOUNTER FOR SCREENING FOR CERVICAL CANCER: ICD-10-CM

## 2023-12-13 DIAGNOSIS — Z01.812 PRE-PROCEDURE LAB EXAM: ICD-10-CM

## 2023-12-13 LAB — HCG UR QL: NEGATIVE

## 2023-12-13 PROCEDURE — 58300 INSERT INTRAUTERINE DEVICE: CPT

## 2023-12-13 PROCEDURE — 81025 URINE PREGNANCY TEST: CPT | Mod: ZL

## 2023-12-13 PROCEDURE — 87624 HPV HI-RISK TYP POOLED RSLT: CPT | Mod: ZL

## 2023-12-13 PROCEDURE — G0463 HOSPITAL OUTPT CLINIC VISIT: HCPCS | Mod: 25

## 2023-12-13 PROCEDURE — 250N000009 HC RX 250

## 2023-12-13 PROCEDURE — 88142 CYTOPATH C/V THIN LAYER: CPT

## 2023-12-13 RX ORDER — COPPER 313.4 MG/1
1 INTRAUTERINE DEVICE INTRAUTERINE CONTINUOUS
Status: ACTIVE
Start: 2023-12-13

## 2023-12-13 RX ADMIN — COPPER 1 EACH: 313.4 INTRAUTERINE DEVICE INTRAUTERINE at 09:12

## 2023-12-13 ASSESSMENT — PATIENT HEALTH QUESTIONNAIRE - PHQ9
SUM OF ALL RESPONSES TO PHQ QUESTIONS 1-9: 8
SUM OF ALL RESPONSES TO PHQ QUESTIONS 1-9: 8
10. IF YOU CHECKED OFF ANY PROBLEMS, HOW DIFFICULT HAVE THESE PROBLEMS MADE IT FOR YOU TO DO YOUR WORK, TAKE CARE OF THINGS AT HOME, OR GET ALONG WITH OTHER PEOPLE: SOMEWHAT DIFFICULT

## 2023-12-13 NOTE — NURSING NOTE
Chief Complaint   Patient presents with    Procedure     IUD Placement        Medication Reconciliation: complete    Patient presents to the clinic for IUD placement. Patient stated that she is interested in the Paraguard IUD     Danni Olson LPN

## 2023-12-13 NOTE — PROGRESS NOTES
IUD Insertion Procedure Visit    SUBJECTIVE: Ilana Aguilar is a 38 year old female, requests paragard. Last unprotected intercourse was >2 weeks ago.     Verification of Procedure:  Just before the procedure begans through verbal and active participation of team members, I verified:     Initials   Patient Name Ilana Aguilar    Patient  1985    Procedure to be performed IUD insertion     Consent:  Risks, benefits of treatment, and alternative options for contraception were discussed.  Patient's questions were elicited and answered.  Written consent was obtained and scanned into medical record.       OBJECTIVE: /82   Pulse 86   Wt 88.9 kg (196 lb)   LMP 2023 (Within Days)   BMI 31.88 kg/m      Pelvic:  Normal appearing external female genitalia. Normal hair distribution. Vagina is without lesions. Small white discharge. Cervix normal, no lesions, no cervical motion tenderness. Uterus is small, mobile, non-tender. No adnexal tenderness or masses. PAP collected     PROCEDURE NOTE  --  ParaGard Insertion    Reason for Insertion:  contraception.    Pregnancy test: Negative    Counseling:  Patient counseled on efficacy, benefits, risks, potential side effects of IUD.  Insertion procedure and risk of infection, perforation, and spontaneous expulsion reviewed.   Advised to plan removal and/or replacement of IUD in 10 years from today or when desired.         Under sterile technique, cervix was visualized with a medium Graves speculum and prepped with Betadine solution. The uterus was sounded to 7 cm. Cervix was difficult to stay dilated.  The ParaGard IUD insertion apparatus was prepared and placed through the cervix without significant resistance and deployed at the fundus in the usual fashion. The strings were trimmed 3 cm from the external os.      Device Lot #: [id number]    Device Expiration Date: 2027     EBL:  Minimal     Complications:  None      Ilana Aguilar tolerated procedure  well.    PLAN:      Written information on IUD use reviewed and given.  Symptoms to report reviewed. To report heavy bleeding, severe cramping, or abnormal vaginal discharge.  May take Ibuprofen 400-800 mg PO TID PRN or Naproxen 500 mg PO BID for cramping. Reminded to check for IUD strings every month. Patient has been counseled to use backup birth control method for 1 week.  Return to clinic in 4-6 weeks for string check. Ilana Aguilar  verbalized understanding of instructions. Will notify of pap results.     Emi BARBER. FNP-C  12/13/2023 9:03 AM

## 2023-12-26 LAB
HUMAN PAPILLOMA VIRUS 16 DNA: NEGATIVE
HUMAN PAPILLOMA VIRUS 18 DNA: NEGATIVE
HUMAN PAPILLOMA VIRUS FINAL DIAGNOSIS: NORMAL
HUMAN PAPILLOMA VIRUS OTHER HR: NEGATIVE

## 2023-12-28 LAB
BKR LAB AP GYN ADEQUACY: NORMAL
BKR LAB AP GYN INTERPRETATION: NORMAL
BKR LAB AP HPV REFLEX: NORMAL
BKR LAB AP PREVIOUS ABNL DX: NORMAL
BKR LAB AP PREVIOUS ABNORMAL: NORMAL
PATH REPORT.COMMENTS IMP SPEC: NORMAL
PATH REPORT.COMMENTS IMP SPEC: NORMAL
PATH REPORT.RELEVANT HX SPEC: NORMAL

## 2024-06-02 ENCOUNTER — HOSPITAL ENCOUNTER (EMERGENCY)
Facility: OTHER | Age: 39
Discharge: HOME | End: 2024-06-02
Attending: FAMILY MEDICINE | Admitting: FAMILY MEDICINE
Payer: COMMERCIAL

## 2024-06-02 VITALS
OXYGEN SATURATION: 97 % | TEMPERATURE: 97.5 F | HEART RATE: 91 BPM | RESPIRATION RATE: 20 BRPM | HEIGHT: 64 IN | WEIGHT: 170 LBS | BODY MASS INDEX: 29.02 KG/M2 | DIASTOLIC BLOOD PRESSURE: 79 MMHG | SYSTOLIC BLOOD PRESSURE: 117 MMHG

## 2024-06-02 DIAGNOSIS — J02.9 VIRAL PHARYNGITIS: ICD-10-CM

## 2024-06-02 LAB — GROUP A STREP BY PCR: NOT DETECTED

## 2024-06-02 PROCEDURE — 99283 EMERGENCY DEPT VISIT LOW MDM: CPT | Performed by: FAMILY MEDICINE

## 2024-06-02 PROCEDURE — 250N000013 HC RX MED GY IP 250 OP 250 PS 637: Performed by: FAMILY MEDICINE

## 2024-06-02 PROCEDURE — 87651 STREP A DNA AMP PROBE: CPT | Performed by: FAMILY MEDICINE

## 2024-06-02 PROCEDURE — 250N000009 HC RX 250: Performed by: FAMILY MEDICINE

## 2024-06-02 RX ORDER — ACETAMINOPHEN 500 MG
1000 TABLET ORAL ONCE
Status: COMPLETED | OUTPATIENT
Start: 2024-06-02 | End: 2024-06-02

## 2024-06-02 RX ORDER — DEXAMETHASONE SODIUM PHOSPHATE 4 MG/ML
10 VIAL (ML) INJECTION ONCE
Status: COMPLETED | OUTPATIENT
Start: 2024-06-02 | End: 2024-06-02

## 2024-06-02 RX ADMIN — ACETAMINOPHEN 1000 MG: 500 TABLET, FILM COATED ORAL at 06:49

## 2024-06-02 RX ADMIN — DEXAMETHASONE SODIUM PHOSPHATE 10 MG: 4 INJECTION, SOLUTION INTRAMUSCULAR; INTRAVENOUS at 07:26

## 2024-06-02 ASSESSMENT — COLUMBIA-SUICIDE SEVERITY RATING SCALE - C-SSRS
1. IN THE PAST MONTH, HAVE YOU WISHED YOU WERE DEAD OR WISHED YOU COULD GO TO SLEEP AND NOT WAKE UP?: NO
6. HAVE YOU EVER DONE ANYTHING, STARTED TO DO ANYTHING, OR PREPARED TO DO ANYTHING TO END YOUR LIFE?: NO
2. HAVE YOU ACTUALLY HAD ANY THOUGHTS OF KILLING YOURSELF IN THE PAST MONTH?: NO

## 2024-06-02 ASSESSMENT — ENCOUNTER SYMPTOMS
FEVER: 0
SORE THROAT: 1
RHINORRHEA: 1
SINUS PRESSURE: 1

## 2024-06-02 ASSESSMENT — ACTIVITIES OF DAILY LIVING (ADL): ADLS_ACUITY_SCORE: 35

## 2024-06-02 NOTE — DISCHARGE INSTRUCTIONS
Thank you for choosing our Emergency Department for your care.     You may receive a phone call or letter for a survey about your care in our ED.  Please complete this as this is how we improve care for our patients.     If you have any questions after leaving the ED you can call or text me on my cell phone at [phone].  This does not mean that I am on call, but I will get back to you.  If you are not doing well please return to the ED.     Sincerely,    Dr Juan Pablo Mendieta M.D.

## 2024-06-02 NOTE — ED PROVIDER NOTES
History     Chief Complaint   Patient presents with    Pharyngitis     The history is provided by the patient.     Ilana Aguilar is a 38 year old female who has a sore throat, some runny nose and sinus pressure. Her daughter had strep at home a week ago. No fever, no cough.    Allergies:  Allergies   Allergen Reactions    Gabapentin      Rash all over body    Penicillins Unknown    Risperdal [Risperidone] Other (See Comments)     Induced/severely worsened catatonia       Problem List:    Patient Active Problem List    Diagnosis Date Noted    Recurrent acute suppurative otitis media of right ear without spontaneous rupture of tympanic membrane 12/12/2023     Priority: Medium    Auditory hallucinations 12/21/2022     Priority: Medium    Tinea pedis of both feet 12/21/2022     Priority: Medium    BV (bacterial vaginosis) 12/21/2022     Priority: Medium    Anogenital (venereal) warts 12/21/2022     Priority: Medium    IUD (intrauterine device) in place 12/21/2022     Priority: Medium     8/16/14, Paraguard Lot#[id number] Exp 7/2020      Psychotic disorder (H) 12/21/2022     Priority: Medium    Methamphetamine use disorder, mild, abuse (H) 12/21/2022     Priority: Medium    Suicidal ideation 08/16/2022     Priority: Medium    Generalized anxiety disorder 10/15/2014     Priority: Medium     Overview:   zoloft and effexor ineffective       Panic disorder 09/07/2010     Priority: Medium    Cervical high risk human papillomavirus (HPV) DNA test positive 07/02/2010     Priority: Medium        Past Medical History:    Past Medical History:   Diagnosis Date    Acute vaginitis     Anogenital (venereal) warts     Encounter for insertion of intrauterine contraceptive device     History of ETOH abuse     History of methamphetamine use     History of suicide attempt     Injury of left ankle     Personal history of other medical treatment (CODE)     Personal history of urinary calculi     Personal history of urinary infection      "Scoliosis     Social phobia        Past Surgical History:    Past Surgical History:   Procedure Laterality Date    COLPOSCOPY         Family History:    Family History   Problem Relation Age of Onset    Other - See Comments Mother         with traumatic brain injury,/Psychiatric illness,depression    Substance Abuse Mother         Alcohol/Drug,chemical dependency    Heart Disease Mother         Heart Disease,  of heart failure at age 40.    Unknown/Adopted Father         Unknown,Unknown to patient    Family History Negative Brother         Good Health    Genetic Disorder No family hx of         Genetic,Denies any family history of cancer, MI or thyroid disorders.       Social History:  Marital Status:   [2]  Social History     Tobacco Use    Smoking status: Never     Passive exposure: Never    Smokeless tobacco: Never   Vaping Use    Vaping status: Never Used   Substance Use Topics    Alcohol use: Yes     Comment: maybe a glass of wine weekly    Drug use: Never        Medications:    ciprofloxacin-dexAMETHasone (CIPRODEX) 0.3-0.1 % otic suspension          Review of Systems   Constitutional:  Negative for fever.   HENT:  Positive for rhinorrhea, sinus pressure and sore throat.    All other systems reviewed and are negative.      Physical Exam   BP: 117/79  Pulse: 91  Temp: 97.5  F (36.4  C)  Resp: 20  Height: 162.6 cm (5' 4\")  Weight: 77.1 kg (170 lb)  SpO2: 97 %      Physical Exam  Vitals and nursing note reviewed.   Constitutional:       General: She is not in acute distress.     Appearance: She is well-developed. She is not ill-appearing.   HENT:      Right Ear: Tympanic membrane and ear canal normal.      Left Ear: Tympanic membrane and ear canal normal.      Mouth/Throat:      Mouth: Mucous membranes are moist.      Pharynx: Uvula midline. Posterior oropharyngeal erythema present. No pharyngeal swelling, oropharyngeal exudate or uvula swelling.      Tonsils: No tonsillar exudate or tonsillar " abscesses.   Eyes:      Conjunctiva/sclera: Conjunctivae normal.      Pupils: Pupils are equal, round, and reactive to light.   Cardiovascular:      Rate and Rhythm: Normal rate and regular rhythm.      Heart sounds: Normal heart sounds.   Pulmonary:      Effort: Pulmonary effort is normal. No respiratory distress.      Breath sounds: Normal breath sounds.   Musculoskeletal:      Cervical back: Normal range of motion.   Lymphadenopathy:      Cervical: Cervical adenopathy present.   Skin:     General: Skin is warm and dry.   Neurological:      Mental Status: She is alert.       Results for orders placed or performed during the hospital encounter of 06/02/24 (from the past 24 hour(s))   Group A Streptococcus PCR Throat Swab    Specimen: Throat; Swab   Result Value Ref Range    Group A strep by PCR Not Detected Not Detected    Narrative    The Xpert Xpress Strep A test, performed on the [employer], is a rapid, qualitative in vitro diagnostic test for the detection of Streptococcus pyogenes (Group A ß-hemolytic Streptococcus, Strep A) in throat swab specimens from patients with signs and symptoms of pharyngitis. The Xpert Xpress Strep A test can be used as an aid in the diagnosis of Group A Streptococcal pharyngitis. The assay is not intended to monitor treatment for Group A Streptococcus infections. The Xpert Xpress Strep A test utilizes an automated real-time polymerase chain reaction (PCR) to detect Streptococcus pyogenes DNA.       Medications   dexAMETHasone (DECADRON) injectable solution used ORALLY 10 mg (has no administration in time range)   acetaminophen (TYLENOL) tablet 1,000 mg (1,000 mg Oral $Given 6/2/24 0649)       Assessments & Plan (with Medical Decision Making)  Ilana Aguilar is a 38 year old female who has a sore throat, some runny nose and sinus pressure. Her daughter had strep at home a week ago. No fever, no cough.  VS in the ED /79   Pulse 91   Temp 97.5  F (36.4  C)  "(Oral)   Resp 20   Ht 1.626 m (5' 4\")   Wt 77.1 kg (170 lb)   SpO2 97%   BMI 29.18 kg/m    Exam shows erythema of the posterior pharynx, otherwise normal exam.   Strep negative.   We gave decadron, no antibiotics.     I have reviewed the nursing notes.    I have reviewed the findings, diagnosis, plan and need for follow up with the patient.  Medical Decision Making  The patient's presentation was of low complexity (an acute and uncomplicated illness or injury).    The patient's evaluation involved:  an assessment requiring an independent historian (see separate area of note for details)  ordering and/or review of 1 test(s) in this encounter (see separate area of note for details)    The patient's management necessitated moderate risk (prescription drug management including medications given in the ED).      Final diagnoses:   Viral pharyngitis       6/2/2024   [facility] AND [facility], Wm Del Rosario MD  06/02/24 0726    "

## 2024-06-02 NOTE — ED TRIAGE NOTES
Patient arrived POV, states she has been having sore throat, body aches, HA and congestion x2 days. Daughter recently diagnosed with strep throat. Took theraflu last evening with minimal result. Awake, alert.      Triage Assessment (Adult)       Row Name 06/02/24 0636          Triage Assessment    Airway WDL WDL     Additional Documentation Breath Sounds (Group)        Respiratory WDL    Respiratory WDL WDL        Breath Sounds    Breath Sounds All Fields     All Lung Fields Breath Sounds Anterior:;equal bilaterally        Skin Circulation/Temperature WDL    Skin Circulation/Temperature WDL WDL        Cardiac WDL    Cardiac WDL WDL        Peripheral/Neurovascular WDL    Peripheral Neurovascular WDL WDL        Cognitive/Neuro/Behavioral WDL    Cognitive/Neuro/Behavioral WDL WDL

## 2024-06-10 ENCOUNTER — OFFICE VISIT (OUTPATIENT)
Dept: FAMILY MEDICINE | Facility: OTHER | Age: 39
End: 2024-06-10
Payer: COMMERCIAL

## 2024-06-10 VITALS
WEIGHT: 192.2 LBS | HEIGHT: 64 IN | HEART RATE: 64 BPM | BODY MASS INDEX: 32.81 KG/M2 | OXYGEN SATURATION: 99 % | RESPIRATION RATE: 16 BRPM | SYSTOLIC BLOOD PRESSURE: 113 MMHG | TEMPERATURE: 98 F | DIASTOLIC BLOOD PRESSURE: 78 MMHG

## 2024-06-10 DIAGNOSIS — R59.1 LYMPHADENOPATHY: ICD-10-CM

## 2024-06-10 DIAGNOSIS — R09.81 CONGESTION OF PARANASAL SINUS: ICD-10-CM

## 2024-06-10 DIAGNOSIS — H93.8X3 SENSATION OF PLUGGED EAR ON BOTH SIDES: ICD-10-CM

## 2024-06-10 DIAGNOSIS — J01.00 ACUTE NON-RECURRENT MAXILLARY SINUSITIS: Primary | ICD-10-CM

## 2024-06-10 DIAGNOSIS — R52 GENERALIZED BODY ACHES: ICD-10-CM

## 2024-06-10 DIAGNOSIS — J02.9 SORE THROAT: ICD-10-CM

## 2024-06-10 DIAGNOSIS — R53.83 FATIGUE, UNSPECIFIED TYPE: ICD-10-CM

## 2024-06-10 PROCEDURE — 99213 OFFICE O/P EST LOW 20 MIN: CPT

## 2024-06-10 PROCEDURE — 250N000009 HC RX 250

## 2024-06-10 PROCEDURE — G0463 HOSPITAL OUTPT CLINIC VISIT: HCPCS

## 2024-06-10 RX ORDER — DEXAMETHASONE SODIUM PHOSPHATE 4 MG/ML
10 VIAL (ML) INJECTION ONCE
Status: COMPLETED | OUTPATIENT
Start: 2024-06-10 | End: 2024-06-10

## 2024-06-10 RX ADMIN — DEXAMETHASONE SODIUM PHOSPHATE 10 MG: 4 INJECTION, SOLUTION INTRAMUSCULAR; INTRAVENOUS at 15:26

## 2024-06-10 ASSESSMENT — PATIENT HEALTH QUESTIONNAIRE - PHQ9
SUM OF ALL RESPONSES TO PHQ QUESTIONS 1-9: 0
10. IF YOU CHECKED OFF ANY PROBLEMS, HOW DIFFICULT HAVE THESE PROBLEMS MADE IT FOR YOU TO DO YOUR WORK, TAKE CARE OF THINGS AT HOME, OR GET ALONG WITH OTHER PEOPLE: NOT DIFFICULT AT ALL
SUM OF ALL RESPONSES TO PHQ QUESTIONS 1-9: 0

## 2024-06-10 ASSESSMENT — PAIN SCALES - GENERAL: PAINLEVEL: NO PAIN (0)

## 2024-06-10 NOTE — NURSING NOTE
Clinic Administered Medication Documentation        Patient was given Decadron 10 mg orally . Prior to medication administration, verified patient's identity using patient s name and date of birth. Please see MAR and medication order for additional information. Patient instructed to remain in clinic for 15 minutes and report any adverse reaction to staff immediately.    Vial/Syringe: Single dose vial. Was entire vial of medication used? Yes   Evelia Beltran LPN on 6/10/2024 at 3:22 PM

## 2024-06-10 NOTE — NURSING NOTE
"Chief Complaint   Patient presents with    Ear Problem     Crackling in both ears.    head cold   Patient presents to the clinic for crackling in both ear following a head cold. No other concerns.  Initial /78 (BP Location: Left arm, Patient Position: Sitting, Cuff Size: Adult Large)   Pulse 64   Temp 98  F (36.7  C) (Temporal)   Resp 16   Ht 1.626 m (5' 4\")   Wt 87.2 kg (192 lb 3.2 oz)   LMP 05/08/2024 (Approximate)   SpO2 99%   BMI 32.99 kg/m   Estimated body mass index is 32.99 kg/m  as calculated from the following:    Height as of this encounter: 1.626 m (5' 4\").    Weight as of this encounter: 87.2 kg (192 lb 3.2 oz).  Medication Review: complete    The next two questions are to help us understand your food security.  If you are feeling you need any assistance in this area, we have resources available to support you today.           No data to display                  Health Care Directive:  Patient does not have a Health Care Directive or Living Will: Discussed advance care planning with patient; however, patient declined at this time.    Navarro Thompson      " Treatment Plan - Urology   Wili Neville 78 y o  male MRN: [id number]  Unit/Bed#: E4 -01 Encounter: [id number]    Urine cytology pending, but patient ready for discharge from Podiatry standpoint  OK to D/C home with outpatient Urology followup  Discharge navigator updated       Adriel Marcos PA-C  Date: 8/7/2018 Time: 9:58 AM

## 2024-06-10 NOTE — PROGRESS NOTES
ASSESSMENT/PLAN:    I have reviewed the nursing notes.  I have reviewed the findings, diagnosis, plan and need for follow up with the patient.    1. Sensation of plugged ear on both sides  2. Sore throat  3. Lymphadenopathy  4. Generalized body aches  5. Fatigue, unspecified type  6. Congestion of paranasal sinus    - dexAMETHasone (DECADRON) injectable solution used ORALLY 10 mg-administered in clinic    7. Acute non-recurrent maxillary sinusitis    - amoxicillin-clavulanate (AUGMENTIN) 875-125 MG tablet; Take 1 tablet by mouth 2 times daily for 10 days  Dispense: 20 tablet; Refill: 0    - Please read the attached information on acute sinusitis for at home care treatment as discussed in the clinic today.    - Symptomatic treatment - Encouraged fluids, salt water gargles, honey (only if greater than 1 year in age due to risk of botulism), elevation, humidifier, sinus rinse/netti pot, lozenges, tea, topical vapor rub, popsicles, rest, etc     - May use over-the-counter Tylenol or ibuprofen as needed for pain, inflammation or fever.    - Discussed warning signs/symptoms indicative of need to f/u    - Follow up if symptoms persist or worsen or concerns    I explained my diagnostic considerations and recommendations to the patient, who voiced understanding and agreement with the treatment plan. All questions were answered. We discussed potential side effects of any prescribed or recommended therapies, as well as expectations for response to treatments.    SUNIL Moore CNP  6/10/2024  3:10 PM    HPI:    Ilana Aguilar is a 38 year old female  who presents to [facility] today for concerns of pressure, fluid in ears.  States ears have plugged sensation.  Also has continued sore throat and swollen lymph nodes, body aches, pressure and pain behind both eyes, maxillary sinus pain, and fatigue for the past week and a half.  Denies fever, shortness of breath, chest pain, cough, rhinorrhea, dizziness, lightheadedness,  "nausea, vomiting, diarrhea.  Patient states that she has been increasing fluids, resting more and taking over-the-counter medications for the past week and a half.    Past Medical History:   Diagnosis Date    Acute vaginitis     History of bacterial vaginosis    Anogenital (venereal) warts     No Comments Provided    Encounter for insertion of intrauterine contraceptive device     14,Paragard Lot# [id number] Exp 2020    History of ETOH abuse     History of methamphetamine use     History of suicide attempt     Injury of left ankle     Left ankle - denied per patient    Personal history of other medical treatment (CODE)     ,  ( one AB at age 20)    Personal history of urinary calculi     No Comments Provided    Personal history of urinary infection     No Comments Provided    Scoliosis     No Comments Provided    Social phobia     No Comments Provided     Past Surgical History:   Procedure Laterality Date    COLPOSCOPY       Social History     Tobacco Use    Smoking status: Never     Passive exposure: Never    Smokeless tobacco: Never   Substance Use Topics    Alcohol use: Yes     Comment: maybe a glass of wine weekly     Current Outpatient Medications   Medication Sig Dispense Refill    ciprofloxacin-dexAMETHasone (CIPRODEX) 0.3-0.1 % otic suspension Place 4 drops into the right ear 2 times daily (Patient not taking: Reported on 6/10/2024) 7.5 mL 0     Allergies   Allergen Reactions    Gabapentin      Rash all over body    Penicillins Unknown    Risperdal [Risperidone] Other (See Comments)     Induced/severely worsened catatonia     Past medical history, past surgical history, current medications and allergies reviewed and accurate to the best of my knowledge.      ROS:  Refer to HPI    /78 (BP Location: Left arm, Patient Position: Sitting, Cuff Size: Adult Large)   Pulse 64   Temp 98  F (36.7  C) (Temporal)   Resp 16   Ht 1.626 m (5' 4\")   Wt 87.2 kg (192 lb 3.2 oz)   LMP 2024 " (Approximate)   SpO2 99%   BMI 32.99 kg/m      EXAM:  General Appearance: Well appearing 38 year old female, appropriate appearance for age. No acute distress   Ears: Left TM intact, translucent with bony landmarks appreciated, no erythema, mild effusion, + bulging, no purulence.  Right TM intact, translucent with bony landmarks appreciated, no erythema, no effusion, no bulging, no purulence.  Left auditory canal clear.  Right auditory canal clear.  Normal external ears, non tender.  Eyes: conjunctivae normal without erythema or irritation, corneas clear, no drainage or crusting, no eyelid swelling, pupils equal   Oropharynx: moist mucous membranes, posterior pharynx without erythema, tonsils symmetric and 1+, no erythema, no exudates or petechiae, no post nasal drip seen, no trismus, voice clear.    Sinuses:  Sinus tenderness upon palpation of the maxillary sinuses  Nose:  Bilateral nares: no erythema, no edema, no drainage, + congestion   Neck: supple with anterior cervical adenopathy  Respiratory: normal chest wall and respirations.  Normal effort.  Clear to auscultation bilaterally, no wheezing, crackles or rhonchi.  No increased work of breathing.  No cough appreciated.  Cardiac: RRR with no murmurs   Musculoskeletal:  Equal movement of bilateral upper extremities.  Equal movement of bilateral lower extremities.  Normal gait.    Neuro: Alert and oriented to person, place, and time.    Psychological: normal affect, alert, oriented, and pleasant.     Answers submitted by the patient for this visit:  Patient Health Questionnaire (Submitted on 6/10/2024)  If you checked off any problems, how difficult have these problems made it for you to do your work, take care of things at home, or get along with other people?: Not difficult at all  PHQ9 TOTAL SCORE: 0  Denies SI or HI today.

## 2024-07-13 ENCOUNTER — HEALTH MAINTENANCE LETTER (OUTPATIENT)
Age: 39
End: 2024-07-13

## 2024-08-29 ENCOUNTER — TELEPHONE (OUTPATIENT)
Dept: FAMILY MEDICINE | Facility: OTHER | Age: 39
End: 2024-08-29

## 2024-08-29 ENCOUNTER — OFFICE VISIT (OUTPATIENT)
Dept: FAMILY MEDICINE | Facility: OTHER | Age: 39
End: 2024-08-29
Attending: PHYSICIAN ASSISTANT
Payer: COMMERCIAL

## 2024-08-29 VITALS
BODY MASS INDEX: 34.49 KG/M2 | TEMPERATURE: 98.1 F | SYSTOLIC BLOOD PRESSURE: 130 MMHG | OXYGEN SATURATION: 95 % | HEIGHT: 64 IN | DIASTOLIC BLOOD PRESSURE: 76 MMHG | WEIGHT: 202 LBS | HEART RATE: 84 BPM

## 2024-08-29 DIAGNOSIS — Z00.00 ROUTINE GENERAL MEDICAL EXAMINATION AT A HEALTH CARE FACILITY: Primary | ICD-10-CM

## 2024-08-29 DIAGNOSIS — Z23 NEED FOR DIPHTHERIA-TETANUS-PERTUSSIS (TDAP) VACCINE: ICD-10-CM

## 2024-08-29 DIAGNOSIS — A74.9 CHLAMYDIA INFECTION: Primary | ICD-10-CM

## 2024-08-29 DIAGNOSIS — Z11.3 SCREEN FOR STD (SEXUALLY TRANSMITTED DISEASE): ICD-10-CM

## 2024-08-29 DIAGNOSIS — J39.2 THROAT IRRITATION: ICD-10-CM

## 2024-08-29 PROBLEM — F15.10: Chronic | Status: RESOLVED | Noted: 2022-12-21 | Resolved: 2024-08-29

## 2024-08-29 LAB
C TRACH DNA SPEC QL PROBE+SIG AMP: POSITIVE
N GONORRHOEA DNA SPEC QL NAA+PROBE: NEGATIVE

## 2024-08-29 PROCEDURE — 99395 PREV VISIT EST AGE 18-39: CPT | Performed by: PHYSICIAN ASSISTANT

## 2024-08-29 PROCEDURE — 87491 CHLMYD TRACH DNA AMP PROBE: CPT | Mod: ZL | Performed by: PHYSICIAN ASSISTANT

## 2024-08-29 PROCEDURE — 90715 TDAP VACCINE 7 YRS/> IM: CPT

## 2024-08-29 RX ORDER — AZITHROMYCIN 500 MG/1
1000 TABLET, FILM COATED ORAL DAILY
Qty: 2 TABLET | Refills: 0 | Status: SHIPPED | OUTPATIENT
Start: 2024-08-29 | End: 2024-08-30

## 2024-08-29 NOTE — PATIENT INSTRUCTIONS
Can call for an ENT referral if needed for throat irritation.  Can try a 2-week trial of Flonase nasal spray for possible postnasal drip or Pepcid for possible heartburn if preferred.    Patient Education   Preventive Care Advice   This is general advice given by our system to help you stay healthy. However, your care team may have specific advice just for you. Please talk to your care team about your preventive care needs.  Nutrition  Eat 5 or more servings of fruits and vegetables each day.  Try wheat bread, brown rice and whole grain pasta (instead of white bread, rice, and pasta).  Get enough calcium and vitamin D. Check the label on foods and aim for 100% of the RDA (recommended daily allowance).  Lifestyle  Exercise at least 150 minutes each week  (30 minutes a day, 5 days a week).  Do muscle strengthening activities 2 days a week. These help control your weight and prevent disease.  No smoking.  Wear sunscreen to prevent skin cancer.  Have a dental exam and cleaning every 6 months.  Yearly exams  See your health care team every year to talk about:  Any changes in your health.  Any medicines your care team has prescribed.  Preventive care, family planning, and ways to prevent chronic diseases.  Shots (vaccines)   HPV shots (up to age 26), if you've never had them before.  Hepatitis B shots (up to age 59), if you've never had them before.  COVID-19 shot: Get this shot when it's due.  Flu shot: Get a flu shot every year.  Tetanus shot: Get a tetanus shot every 10 years.  Pneumococcal, hepatitis A, and RSV shots: Ask your care team if you need these based on your risk.  Shingles shot (for age 50 and up)  General health tests  Diabetes screening:  Starting at age 35, Get screened for diabetes at least every 3 years.  If you are younger than age 35, ask your care team if you should be screened for diabetes.  Cholesterol test: At age 39, start having a cholesterol test every 5 years, or more often if advised.  Bone  density scan (DEXA): At age 50, ask your care team if you should have this scan for osteoporosis (brittle bones).  Hepatitis C: Get tested at least once in your life.  STIs (sexually transmitted infections)  Before age 24: Ask your care team if you should be screened for STIs.  After age 24: Get screened for STIs if you're at risk. You are at risk for STIs (including HIV) if:  You are sexually active with more than one person.  You don't use condoms every time.  You or a partner was diagnosed with a sexually transmitted infection.  If you are at risk for HIV, ask about PrEP medicine to prevent HIV.  Get tested for HIV at least once in your life, whether you are at risk for HIV or not.  Cancer screening tests  Cervical cancer screening: If you have a cervix, begin getting regular cervical cancer screening tests starting at age 21.  Breast cancer scan (mammogram): If you've ever had breasts, begin having regular mammograms starting at age 40. This is a scan to check for breast cancer.  Colon cancer screening: It is important to start screening for colon cancer at age 45.  Have a colonoscopy test every 10 years (or more often if you're at risk) Or, ask your provider about stool tests like a FIT test every year or Cologuard test every 3 years.  To learn more about your testing options, visit:   .  For help making a decision, visit:   https://bit.ly/fw37728.  Prostate cancer screening test: If you have a prostate, ask your care team if a prostate cancer screening test (PSA) at age 55 is right for you.  Lung cancer screening: If you are a current or former smoker ages 50 to 80, ask your care team if ongoing lung cancer screenings are right for you.  For informational purposes only. Not to replace the advice of your health care provider. Copyright   2023 [employer]. All rights reserved. Clinically reviewed by the [facility] Transitions Program. [employer] 365359 - REV 01/24.

## 2024-08-29 NOTE — TELEPHONE ENCOUNTER
Please call  Your gonorrhea test is negative.  Chlamydia test is positive.  This is unfortunately an STD.  I sent azithromycin to the pharmacy for treatment.  Your partner also needs to be treated.  No sex for 7 days after BOTH partners have been treated.  I would recommend having a repeat test completed in 3 months to ensure that you are not reinfected.    Please let me know if you need treatment for your partner-would need name and information.  Savannah Alcaraz PA-C.......... 8/29/2024  11:31 AM

## 2024-08-29 NOTE — PROGRESS NOTES
"Preventive Care Visit  [facility]  Savannah Alcaraz PA-C, Family Medicine  Aug 29, 2024      Assessment & Plan   Problem List Items Addressed This Visit    None  Visit Diagnoses       Routine general medical examination at a health care facility    -  Primary    Need for diphtheria-tetanus-pertussis (Tdap) vaccine        Relevant Orders    GH IMM - TDAP (ADACEL, BOOSTRIX) (Completed)    Screen for STD (sexually transmitted disease)        Relevant Orders    GC/Chlamydia by PCR (Completed)    Throat irritation               Throat irritation:  Can call for an ENT referral if needed for throat irritation.  Can try a 2-week trial of Flonase nasal spray for possible postnasal drip or Pepcid for possible heartburn if preferred.    Completed gonorrhea and chlamydia STD screen.  Labs are pending.    Gave Tdap vaccine.    Repeat physical in 1 year.  Gave patient education.  Repeat Pap in 6 months.       BMI  Estimated body mass index is 34.67 kg/m  as calculated from the following:    Height as of this encounter: 1.626 m (5' 4\").    Weight as of this encounter: 91.6 kg (202 lb).   Weight management plan: Discussed healthy diet and exercise guidelines    See Patient Instructions    Return in about 53 weeks (around 9/4/2025) for Annual Wellness Visit.      Christina Preciado is a 38 year old, presenting for the following:  Follow Up        8/29/2024     8:50 AM   Additional Questions   Roomed by Barbie FLANNERY CMA        Health Care Directive  Patient does not have a Health Care Directive or Living Will: Discussed advance care planning with patient; however, patient declined at this time.    History of Present Illness       Reason for visit:  Yearly preventative visit   She is taking medications regularly.      Patient's last menstrual period was 08/12/2024 (exact date).   Contraception: IUD 12/2023 - copper IUD  Risk for STI?: no symptoms  Last pap: 12/13/2023 -normal Pap and negative HPV - repeat in 1 " year  2022 - normal pap and positive HPV  2014-normal pap  Any hx of abnormal paps:  yes  FH of early CA?: none  Cholesterol/DM concerns/screenin, declines repeat  Tobacco?: none  Lung cancer screening: na  Calcium intake: minimal  DEXA: na  Last mammo:   Colonoscopy: na  Hepatitis C screen: 2022   HIV screen: 2022   Immunizations: Tdap     Patient has noticed mild throat irritation and possible lymph node irritation of the throat.  Has been up and down throughout the summer.  No strep throat exposures.  No postnasal drip or heartburn symptoms.  No cough, fevers, chills, or other concerns.           No data to display                  2022   Nutrition   Three or more servings of calcium each day? Yes   Diet: regular (no restrictions)            2022   Exercise   Frequency of exercise: None               2022   Dental   Dentist two times every year? Yes               Today's PHQ-9 Score:       6/10/2024     2:51 PM   PHQ-9 SCORE   PHQ-9 Total Score MyChart 0   PHQ-9 Total Score 0           2022   Substance Use   Alcohol more than 3/day or more than 7/wk No        Social History     Tobacco Use    Smoking status: Never     Passive exposure: Never    Smokeless tobacco: Never   Vaping Use    Vaping status: Never Used   Substance Use Topics    Alcohol use: Yes     Comment: maybe a glass of wine weekly    Drug use: Never           2022   LAST FHS-7 RESULTS   1st degree relative breast or ovarian cancer No   Any relative bilateral breast cancer No   Any male have breast cancer No   Any ONE woman have BOTH breast AND ovarian cancer No   Any woman with breast cancer before 50yrs No   2 or more relatives with breast AND/OR ovarian cancer No   2 or more relatives with breast AND/OR bowel cancer No           Mammogram Screening - Patient under 40 years of age: Routine Mammogram Screening not recommended.       History of abnormal Pap smear: YES - reflected in Problem List and  Health Maintenance accordingly        Latest Ref Rng & Units 2023     8:49 AM 2022     3:48 PM   PAP / HPV   PAP  Negative for Intraepithelial Lesion or Malignancy (NILM)  Negative for Intraepithelial Lesion or Malignancy (NILM)    HPV 16 DNA Negative Negative  Positive    HPV 18 DNA Negative Negative  Negative    Other HR HPV Negative Negative  Negative             No data to display                 Reviewed and updated as needed this visit by Provider   Tobacco  Allergies  Meds  Problems  Med Hx  Surg Hx  Fam Hx            Past Medical History:   Diagnosis Date    Acute vaginitis     History of bacterial vaginosis    Anogenital (venereal) warts     No Comments Provided    Encounter for insertion of intrauterine contraceptive device     14,Paragard Lot# [id number] Exp 2020    History of ETOH abuse     History of methamphetamine use     History of suicide attempt     Injury of left ankle     Left ankle - denied per patient    Methamphetamine use disorder, mild, abuse (H) 2022    Personal history of other medical treatment (CODE)     ,  ( one AB at age 20)    Personal history of urinary calculi     No Comments Provided    Personal history of urinary infection     No Comments Provided    Scoliosis     No Comments Provided    Social phobia     No Comments Provided     Past Surgical History:   Procedure Laterality Date    COLPOSCOPY       OB History    Para Term  AB Living   3 2 1 0 1 2   SAB IAB Ectopic Multiple Live Births   0 0 0 0 0     Labs reviewed in EPIC  BP Readings from Last 3 Encounters:   24 130/76   06/10/24 113/78   24 117/79    Wt Readings from Last 3 Encounters:   24 91.6 kg (202 lb)   06/10/24 87.2 kg (192 lb 3.2 oz)   24 77.1 kg (170 lb)                  Patient Active Problem List   Diagnosis    Cervical high risk human papillomavirus (HPV) DNA test positive    Generalized anxiety disorder    Panic disorder    Suicidal ideation     Auditory hallucinations    Tinea pedis of both feet    BV (bacterial vaginosis)    Anogenital (venereal) warts    IUD (intrauterine device) in place    Psychotic disorder (H)    Recurrent acute suppurative otitis media of right ear without spontaneous rupture of tympanic membrane     Past Surgical History:   Procedure Laterality Date    COLPOSCOPY         Social History     Tobacco Use    Smoking status: Never     Passive exposure: Never    Smokeless tobacco: Never   Substance Use Topics    Alcohol use: Yes     Comment: maybe a glass of wine weekly     Family History   Problem Relation Age of Onset    Other - See Comments Mother         with traumatic brain injury,/Psychiatric illness,depression    Substance Abuse Mother         Alcohol/Drug,chemical dependency    Heart Disease Mother         Heart Disease,  of heart failure at age 40.    Unknown/Adopted Father         Unknown,Unknown to patient    Family History Negative Brother         Good Health    No Known Problems Daughter     No Known Problems Daughter     Genetic Disorder No family hx of         Genetic,Denies any family history of cancer, MI or thyroid disorders.         Current Outpatient Medications   Medication Sig Dispense Refill    ciprofloxacin-dexAMETHasone (CIPRODEX) 0.3-0.1 % otic suspension Place 4 drops into the right ear 2 times daily (Patient not taking: Reported on 6/10/2024) 7.5 mL 0     Allergies   Allergen Reactions    Gabapentin      Rash all over body    Penicillins Unknown    Risperdal [Risperidone] Other (See Comments)     Induced/severely worsened catatonia     Recent Labs   Lab Test 12/10/22  0624 22  1638 08/15/22  2117 22  1608   LDL  --  93  --  81   HDL  --  35*  --  49   TRIG  --  119  --  109   ALT  --  11 15 19   CR 0.89 0.82 0.94 0.87   GFRESTIMATED 86 >90 81 89   POTASSIUM 4.5 3.9 3.5 3.7   TSH 2.28 0.70  --  1.99          Review of Systems  Constitutional, neuro, ENT, endocrine, pulmonary, cardiac,  "gastrointestinal, genitourinary, musculoskeletal, integument and psychiatric systems are negative, except as otherwise noted.     Objective    Exam  /76   Pulse 84   Temp 98.1  F (36.7  C) (Tympanic)   Ht 1.626 m (5' 4\")   Wt 91.6 kg (202 lb)   LMP 08/12/2024 (Exact Date)   SpO2 95%   BMI 34.67 kg/m     Estimated body mass index is 34.67 kg/m  as calculated from the following:    Height as of this encounter: 1.626 m (5' 4\").    Weight as of this encounter: 91.6 kg (202 lb).    Physical Exam  GENERAL: alert and no distress  EYES: Eyes grossly normal to inspection, PERRL and conjunctivae and sclerae normal  HENT: ear canals and TM's normal, nose and mouth without ulcers or lesions  NECK: no adenopathy, no asymmetry, masses, or scars  RESP: lungs clear to auscultation - no rales, rhonchi or wheezes  CV: regular rate and rhythm, normal S1 S2, no S3 or S4, no murmur, click or rub, no peripheral edema  ABDOMEN: soft, nontender, no hepatosplenomegaly, no masses and bowel sounds normal  MS: no gross musculoskeletal defects noted, no edema  SKIN: no suspicious lesions or rashes  NEURO: Normal strength and tone, mentation intact and speech normal  PSYCH: mentation appears normal, affect normal/bright        Signed Electronically by: Savannah Alcaraz PA-C    "

## 2024-08-29 NOTE — TELEPHONE ENCOUNTER
Patient did get the results through [employer] and picked up her prescription. Writer did read her the note from Savannah Alcaraz. Her partner will be going into the [facility] for treatment.  Barbie Valerio MA on 8/29/2024 at 1:54 PM

## 2024-09-24 ENCOUNTER — OFFICE VISIT (OUTPATIENT)
Dept: FAMILY MEDICINE | Facility: OTHER | Age: 39
End: 2024-09-24
Attending: PHYSICIAN ASSISTANT
Payer: COMMERCIAL

## 2024-09-24 VITALS
SYSTOLIC BLOOD PRESSURE: 132 MMHG | DIASTOLIC BLOOD PRESSURE: 79 MMHG | HEART RATE: 93 BPM | BODY MASS INDEX: 34.74 KG/M2 | WEIGHT: 202.4 LBS | TEMPERATURE: 98.2 F | OXYGEN SATURATION: 97 %

## 2024-09-24 DIAGNOSIS — R50.9 FEVER, UNSPECIFIED FEVER CAUSE: ICD-10-CM

## 2024-09-24 DIAGNOSIS — J06.9 VIRAL URI: Primary | ICD-10-CM

## 2024-09-24 DIAGNOSIS — J02.9 SORE THROAT: ICD-10-CM

## 2024-09-24 LAB
FLUAV RNA SPEC QL NAA+PROBE: NEGATIVE
FLUBV RNA RESP QL NAA+PROBE: NEGATIVE
GROUP A STREP BY PCR: NOT DETECTED
RSV RNA SPEC NAA+PROBE: NEGATIVE
SARS-COV-2 RNA RESP QL NAA+PROBE: NEGATIVE

## 2024-09-24 PROCEDURE — 87637 SARSCOV2&INF A&B&RSV AMP PRB: CPT | Mod: ZL | Performed by: PHYSICIAN ASSISTANT

## 2024-09-24 PROCEDURE — 87651 STREP A DNA AMP PROBE: CPT | Mod: ZL | Performed by: PHYSICIAN ASSISTANT

## 2024-09-24 PROCEDURE — 99213 OFFICE O/P EST LOW 20 MIN: CPT | Performed by: PHYSICIAN ASSISTANT

## 2024-09-24 ASSESSMENT — ANXIETY QUESTIONNAIRES
GAD7 TOTAL SCORE: 12
GAD7 TOTAL SCORE: 12
8. IF YOU CHECKED OFF ANY PROBLEMS, HOW DIFFICULT HAVE THESE MADE IT FOR YOU TO DO YOUR WORK, TAKE CARE OF THINGS AT HOME, OR GET ALONG WITH OTHER PEOPLE?: SOMEWHAT DIFFICULT
7. FEELING AFRAID AS IF SOMETHING AWFUL MIGHT HAPPEN: SEVERAL DAYS
GAD7 TOTAL SCORE: 12

## 2024-09-24 NOTE — LETTER
2024    Ilana Severinoyeison   1985        To Whom it May Concern;    Please excuse Ilana Aguilar from work/school for a healthcare visit on Sep 24, 2024. Please excuse from work from -2024.     Sincerely,        Savannah Alcaraz PA-C

## 2024-09-24 NOTE — NURSING NOTE
"Chief Complaint   Patient presents with    URI    flu like symptoms     Patient has been feeling sick since last Wednesday. She has had fever, body aches, fatigue, back pain, joint pain, headaches. Also she had a chlamydia infection in Aug and is unsure if partner actually got treated. She would like to get tested again today.  Initial /79   Pulse 93   Temp 98.2  F (36.8  C) (Tympanic)   Wt 91.8 kg (202 lb 6.4 oz)   LMP 08/12/2024 (Exact Date)   SpO2 97%   BMI 34.74 kg/m   Estimated body mass index is 34.74 kg/m  as calculated from the following:    Height as of 8/29/24: 1.626 m (5' 4\").    Weight as of this encounter: 91.8 kg (202 lb 6.4 oz).  Medication Review: complete    The next two questions are to help us understand your food security.  If you are feeling you need any assistance in this area, we have resources available to support you today.          8/29/2024   SDOH- Food Insecurity   Within the past 12 months, did you worry that your food would run out before you got money to buy more? N   Within the past 12 months, did the food you bought just not last and you didn t have money to get more? N            Health Care Directive:  Patient does not have a Health Care Directive or Living Will: Discussed advance care planning with patient; however, patient declined at this time.    Barbie Valerio MA      "

## 2024-09-24 NOTE — PROGRESS NOTES
Assessment & Plan   Problem List Items Addressed This Visit    None  Visit Diagnoses       Viral URI    -  Primary    Fever, unspecified fever cause        Relevant Orders    Symptomatic Influenza A/B, RSV, & SARS-CoV2 PCR (COVID-19) Nose (Completed)    Sore throat        Relevant Orders    Group A Streptococcus PCR Throat Swab (Completed)           Completed strep, influenza, covid-19 and RSV test. Negative. Symptoms are viral .    Symptoms due to a virus. No antibiotic is needed at this time. Symptoms typically worsen on days 3-4 and then begin improving each day. If symptoms begin worsening or fail to improve after 10 days, return to clinic for reevaluation.     May use symptomatic care with tylenol or ibuprofen. Sudafed or mucinex work well for congestion. May use cough syrup or cough drops.  Using a humidifier works well to break up the congestion. You can also sleep propped up on a couple pillows to decrease symptoms at night.    Please take tylenol as needed up to 4 times daily.  Treat symptomatically with warm salt water gargles.  Lozenges, Tylenol, Advil or Aleve as needed. Frequent swallows of cool liquid.  Oatmeal coats the throat and some patients find it soothes the pain. Encouraged warm teas or fluids with honey.     If you have sinus congestion -  Use a Neti Pot/sinus flush ([employer] Med Sinus Rinse) 3 times daily to irrigate sinuses/mucosal tissue.     Monitor for any fevers or chills. Return in 7-10 days if not feeling better. Please call clinic with any questions or concerns. Return to clinic with change/worsening of symptoms.   Encouraged fluids and rest.    Call 9-1-1 or go to the emergency room if you:  Have trouble breathing   Are drooling because you cannot swallow your saliva   Have swelling of the neck or tongue   Cannot move your neck or have trouble opening your mouth             See Patient Instructions    No follow-ups on file.      Christina Preciado is a 39 year old, presenting for the  "following health issues:  URI and flu like symptoms        9/24/2024     2:17 PM   Additional Questions   Roomed by Barbie FLANNERY CMA     History of Present Illness       Back Pain:  She presents for follow up of back pain. Patient's back pain is a new problem.    Original cause of back pain: not sure  First noticed back pain: in the last week  Patient feels back pain: comes and goesLocation of back pain:  Right lower back, left lower back, right hip and left hip  Description of back pain: dull ache  Back pain spreads: nowhere    Since patient first noticed back pain, pain is: unchanged  Does back pain interfere with her job:  Not applicable  On a scale of 1-10 (10 being the worst), patient describes pain as:  5  What makes back pain worse: stress   Acupuncture: not tried  Acetaminophen: helpful  Activity or exercise: not tried  Chiropractor:  Not tried  Cold: not tried  Heat: helpful  Massage: helpful  Muscle relaxants: not tried  NSAIDS: helpful  Opioids: not tried  Physical Therapy: not tried  Rest: helpful  Steroid Injection: not tried  Stretching: not tried  Surgery: not tried  TENS unit: helpful  Topical pain relievers: helpful  Other healthcare providers patient is seeing for back pain: None    Mental Health Follow-up:  Patient presents to follow-up on Anxiety.    Patient's anxiety since last visit has been:  Good  The patient is not having other symptoms associated with anxiety.  Any significant life events: relationship concerns  Patient is feeling anxious or having panic attacks.  Patient has no concerns about alcohol or drug use.    Headaches:   Since the patient's last clinic visit, headaches are: worsened  The patient is getting headaches:  Everyday  She is not able to do normal daily activities when she has a migraine.  The patient is taking the following rescue/relief medications:  Ibuprofen (Advil, Motrin), Naproxyn (Aleve) and Tylenol   Patient states \"I get some relief\" from the rescue/relief " medications.   The patient is taking the following medications to prevent migraines:  No medications to prevent migraines  In the past 4 weeks, the patient has gone to an Urgent Care or Emergency Room 0 times times due to headaches.    Reason for visit:  Severe flu like symptoms  Symptom onset:  3-7 days ago  Symptom intensity:  Moderate  Symptom progression:  Worsening  Had these symptoms before:  No  What makes it worse:  No  What makes it better:  Sleep and arthritis pain reliever   She is taking medications regularly.     Acute Illness  Acute illness concerns: flu/covid?  Onset/Duration: last wednesday  Symptoms:  Fever: YES  Chills/Sweats: YES  Headache (location?): YES  Sinus Pressure: No  Conjunctivitis:  No  Ear Pain: no  Rhinorrhea: YES  Congestion: YES  Sore Throat: No  Cough: no  Wheeze: No  Decreased Appetite: YES  Nausea: YES  Vomiting: No  Diarrhea: No  Dysuria/Freq.: No  Dysuria or Hematuria: No  Fatigue/Achiness: YES  Sick/Strep Exposure: No  Therapies tried and outcome: fever reducer    Patient is coming in today with cough and cold symptoms.  Did not feel well starting last week.  Now feels like she has hard time functioning.  Body aches and pains.  Some chest discomfort.  Body aching.  Back pain and low back.  Little cough.  No runny nose or sinus pain.  Using arthritis pain relief.  Sore throat.  No strep exposure.  Harder time breathing.  Diarrhea today.  Mild stomach pain.  Last week she had a heavy chest and nose irritation initially.  That resolved and then moved to her body.  Staying in a dark room. HA and eye irritation. No rash or dysuria.    Review of Systems  Constitutional, HEENT, cardiovascular, pulmonary, gi and gu systems are negative, except as otherwise noted.      Objective    /79   Pulse 93   Temp 98.2  F (36.8  C) (Tympanic)   Wt 91.8 kg (202 lb 6.4 oz)   LMP 08/12/2024 (Exact Date)   SpO2 97%   BMI 34.74 kg/m    Body mass index is 34.74 kg/m .  Physical Exam  Vitals  and nursing note reviewed.   Constitutional:       Appearance: Normal appearance.   HENT:      Head: Normocephalic and atraumatic.      Right Ear: Tympanic membrane, ear canal and external ear normal.      Left Ear: Tympanic membrane, ear canal and external ear normal.      Mouth/Throat:      Mouth: Mucous membranes are moist.      Pharynx: Oropharynx is clear. Posterior oropharyngeal erythema present. No oropharyngeal exudate.   Cardiovascular:      Rate and Rhythm: Normal rate and regular rhythm.      Heart sounds: Normal heart sounds.   Pulmonary:      Effort: Pulmonary effort is normal.      Breath sounds: Normal breath sounds. No wheezing or rales.   Musculoskeletal:         General: Normal range of motion.      Cervical back: Normal range of motion and neck supple.   Lymphadenopathy:      Cervical: No cervical adenopathy.   Skin:     General: Skin is warm and dry.   Neurological:      General: No focal deficit present.      Mental Status: She is alert.   Psychiatric:         Mood and Affect: Mood normal.         Behavior: Behavior normal.            Results for orders placed or performed in visit on 09/24/24   Symptomatic Influenza A/B, RSV, & SARS-CoV2 PCR (COVID-19) Nose     Status: Normal    Specimen: Nose; Swab   Result Value Ref Range    Influenza A PCR Negative Negative    Influenza B PCR Negative Negative    RSV PCR Negative Negative    SARS CoV2 PCR Negative Negative    Narrative    Testing was performed using the Xpert Xpress CoV2/Flu/RSV Assay on the [employer] GeneXpert Instrument. This test should be ordered for the detection of SARS-CoV2, influenza, and RSV viruses in individuals with signs and symptoms of respiratory tract infection. This test is for in vitro diagnostic use under the [place] FDA for laboratories certified under CLIA to perform high or moderate complexity testing. This test has been [place] FDA cleared. A negative result does not rule out the presence of PCR inhibitors in the specimen or  target RNA in concentration below the limit of detection for the assay. If only one viral target is positive but coinfection with multiple targets is suspected, the sample should be re-tested with another FDA cleared, approved, or authorized test, if coninfection would change clinical management. This test was validated by the [facility]. These laboratories are certified under the Clinical Laboratory Improvement Amendments of 1988 (CLIA-88) as qualified to perfom high complexity laboratory testing.   Group A Streptococcus PCR Throat Swab     Status: Normal    Specimen: Throat; Swab   Result Value Ref Range    Group A strep by PCR Not Detected Not Detected    Narrative    The Xpert Xpress Strep A test, performed on the [employer], is a rapid, qualitative in vitro diagnostic test for the detection of Streptococcus pyogenes (Group A ß-hemolytic Streptococcus, Strep A) in throat swab specimens from patients with signs and symptoms of pharyngitis. The Xpert Xpress Strep A test can be used as an aid in the diagnosis of Group A Streptococcal pharyngitis. The assay is not intended to monitor treatment for Group A Streptococcus infections. The Xpert Xpress Strep A test utilizes an automated real-time polymerase chain reaction (PCR) to detect Streptococcus pyogenes DNA.           Signed Electronically by: Savannah Alcaraz PA-C

## 2024-09-24 NOTE — PATIENT INSTRUCTIONS
May use symptomatic care with tylenol or ibuprofen. Sudafed or mucinex work well for congestion. May use cough syrup or cough drops.  Using a humidifier works well to break up the congestion. You can also sleep propped up on a couple pillows to decrease symptoms at night.    Please take tylenol as needed up to 4 times daily.  Treat symptomatically with warm salt water gargles.  Lozenges, Tylenol, Advil or Aleve as needed. Frequent swallows of cool liquid.  Oatmeal coats the throat and some patients find it soothes the pain. Encouraged warm teas or fluids with honey.     If you have sinus congestion -  Use a Neti Pot/sinus flush ([employer] Med Sinus Rinse) 3 times daily to irrigate sinuses/mucosal tissue.     Monitor for any fevers or chills. Return in 7-10 days if not feeling better. Please call clinic with any questions or concerns. Return to clinic with change/worsening of symptoms.   Encouraged fluids and rest.    Call 9-1-1 or go to the emergency room if you:  Have trouble breathing   Are drooling because you cannot swallow your saliva   Have swelling of the neck or tongue   Cannot move your neck or have trouble opening your mouth

## 2024-12-04 ENCOUNTER — OFFICE VISIT (OUTPATIENT)
Dept: FAMILY MEDICINE | Facility: OTHER | Age: 39
End: 2024-12-04
Attending: REGISTERED NURSE
Payer: COMMERCIAL

## 2024-12-04 VITALS
RESPIRATION RATE: 16 BRPM | TEMPERATURE: 97.6 F | HEART RATE: 83 BPM | SYSTOLIC BLOOD PRESSURE: 113 MMHG | HEIGHT: 64 IN | OXYGEN SATURATION: 97 % | WEIGHT: 214.6 LBS | DIASTOLIC BLOOD PRESSURE: 78 MMHG | BODY MASS INDEX: 36.64 KG/M2

## 2024-12-04 DIAGNOSIS — R09.81 NASAL CONGESTION: ICD-10-CM

## 2024-12-04 DIAGNOSIS — H66.91 RIGHT ACUTE OTITIS MEDIA: Primary | ICD-10-CM

## 2024-12-04 RX ORDER — FLUTICASONE PROPIONATE 50 MCG
1 SPRAY, SUSPENSION (ML) NASAL DAILY
Qty: 15.8 ML | Refills: 0 | Status: SHIPPED | OUTPATIENT
Start: 2024-12-04

## 2024-12-04 RX ORDER — CETIRIZINE HYDROCHLORIDE 10 MG/1
10 TABLET ORAL DAILY
Qty: 30 TABLET | Refills: 0 | Status: SHIPPED | OUTPATIENT
Start: 2024-12-04

## 2024-12-04 RX ORDER — CEFDINIR 300 MG/1
300 CAPSULE ORAL 2 TIMES DAILY
Qty: 14 CAPSULE | Refills: 0 | Status: SHIPPED | OUTPATIENT
Start: 2024-12-04 | End: 2024-12-11

## 2024-12-04 ASSESSMENT — PATIENT HEALTH QUESTIONNAIRE - PHQ9: SUM OF ALL RESPONSES TO PHQ QUESTIONS 1-9: 5

## 2024-12-04 ASSESSMENT — PAIN SCALES - GENERAL: PAINLEVEL_OUTOF10: NO PAIN (0)

## 2024-12-04 NOTE — PROGRESS NOTES
ASSESSMENT/PLAN:    I have reviewed the nursing notes.  I have reviewed the findings, diagnosis, plan and need for follow up with the patient.    1. Right acute otitis media (Primary)  - cefdinir (OMNICEF) 300 MG capsule; Take 1 capsule (300 mg) by mouth 2 times daily for 7 days.  Dispense: 14 capsule; Refill: 0    Patient presents with bilateral ear pain.  Patient's vitals are stable.  Physical exam indicates right acute otitis media.  Will treat with Omnicef.  Patient may take Tylenol and ibuprofen as needed.    2. Nasal congestion  - fluticasone (FLONASE) 50 MCG/ACT nasal spray; Spray 1 spray into both nostrils daily.  Dispense: 15.8 mL; Refill: 0  - cetirizine (ZYRTEC) 10 MG tablet; Take 1 tablet (10 mg) by mouth daily.  Dispense: 30 tablet; Refill: 0    Patient has also been experiencing nasal congestion.  Will treat with Zyrtec and Flonase.    Discussed warning signs/symptoms indicative of need to f/u    Follow up if symptoms persist or worsen or concerns    I explained my diagnostic considerations and recommendations to the patient, who voiced understanding and agreement with the treatment plan. All questions were answered. We discussed potential side effects of any prescribed or recommended therapies, as well as expectations for response to treatments.    SUNIL Cabrales CNP  12/4/2024  4:37 PM    HPI:    Ilana Aguilar is a 39 year old female  who [facility] for concerns of ear pain    bilateral ear pain x 3 days duration.     Presence of the following:   Yes  fevers or chills. Fever, highest reported temperature: low grade  Yes sore throat/pharyngitis/tonsillitis.   Yes  allergy/URI Symptoms  Yes  Muffled Sounds/Change in Hearing  Yes  Sensation of Fullness in Ear(s)  No Ringing in Ears/Tinnitus  No Balance Changes  Yes  Dizziness  Yes  Headache   Yes  Ear Drainage  Additional Symptoms: No  Denies persistent hearing loss, foul smelling odor from ear, changes in vision, nausea,  vomiting, diarrhea, chest pain, shortness of breath.     No Recent swimming/hot tub  No submerging of head in shower/bathtub.     Yes  Recent URI or other illness  History of otitis media: Yes  History of HEENT surgery (PE tubes, tonsillectomy/adenoidectomy, etc.): No  Recent Course of ABX: No    Treatments Tried: dayquil, tylenol, rest, fluids  Prior History of Similar Symptoms: Yes     PCP - none    Past Medical History:   Diagnosis Date    Acute vaginitis     History of bacterial vaginosis    Anogenital (venereal) warts     No Comments Provided    Encounter for insertion of intrauterine contraceptive device     14,Parag[employer] Lot# [id number] Exp 2020    History of ETOH abuse     History of methamphetamine use     History of suicide attempt     Injury of left ankle     Left ankle - denied per patient    Methamphetamine use disorder, mild, abuse (H) 2022    Personal history of other medical treatment (CODE)     ,  ( one AB at age 20)    Personal history of urinary calculi     No Comments Provided    Personal history of urinary infection     No Comments Provided    Scoliosis     No Comments Provided    Social phobia     No Comments Provided     Past Surgical History:   Procedure Laterality Date    COLPOSCOPY       Social History     Tobacco Use    Smoking status: Never     Passive exposure: Never    Smokeless tobacco: Never   Substance Use Topics    Alcohol use: Yes     Comment: maybe a glass of wine weekly     Current Outpatient Medications   Medication Sig Dispense Refill    ciprofloxacin-dexAMETHasone (CIPRODEX) 0.3-0.1 % otic suspension Place 4 drops into the right ear 2 times daily (Patient not taking: Reported on 2024) 7.5 mL 0     Allergies   Allergen Reactions    Gabapentin      Rash all over body    Penicillins Unknown    Risperdal [Risperidone] Other (See Comments)     Induced/severely worsened catatonia     Past medical history, past surgical history, current medications and  "allergies reviewed and accurate to the best of my knowledge.      ROS:  Refer to HPI    /78 (BP Location: Right arm, Patient Position: Sitting, Cuff Size: Adult Regular)   Pulse 83   Temp 97.6  F (36.4  C) (Tympanic)   Resp 16   Ht 1.626 m (5' 4\")   Wt 97.3 kg (214 lb 9.6 oz)   SpO2 97%   BMI 36.84 kg/m      EXAM:  General Appearance: Well appearing 39 year old female, appropriate appearance for age. No acute distress   Ears: Left TM intact, translucent with bony landmarks appreciated, no erythema, moderate effusion and bulging, no purulence.  Right TM intact, no erythema, moderate effusion, bulging, and purulence.  Left auditory canal clear.  Right auditory canal clear.  Normal external ears, non tender.  Eyes: conjunctivae normal without erythema or irritation, corneas clear, no drainage or crusting, no eyelid swelling, pupils equal   Oropharynx: moist mucous membranes, posterior pharynx without erythema, tonsils symmetric and 1+, no erythema, no exudates or petechiae, no post nasal drip seen, no trismus, voice clear.    Nose:  Bilateral nares: no erythema, no edema, moderate congestion noted  Neck: supple without adenopathy  Neuro: Alert and oriented to person, place, and time.   Psychological: normal affect, alert, oriented, and pleasant.       "

## 2024-12-04 NOTE — NURSING NOTE
"Chief Complaint   Patient presents with    Ear Problem     Both   [facility] concerns of ear pain. Patient notes pain started a few days ago and started to drain today.     Initial /78 (BP Location: Right arm, Patient Position: Sitting, Cuff Size: Adult Regular)   Pulse 83   Temp 97.6  F (36.4  C) (Tympanic)   Resp 16   Ht 1.626 m (5' 4\")   Wt 97.3 kg (214 lb 9.6 oz)   SpO2 97%   BMI 36.84 kg/m   Estimated body mass index is 36.84 kg/m  as calculated from the following:    Height as of this encounter: 1.626 m (5' 4\").    Weight as of this encounter: 97.3 kg (214 lb 9.6 oz).  Medication Review: complete    The next two questions are to help us understand your food security.  If you are feeling you need any assistance in this area, we have resources available to support you today.          12/4/2024   SDOH- Food Insecurity   Within the past 12 months, did you worry that your food would run out before you got money to buy more? N   Within the past 12 months, did the food you bought just not last and you didn t have money to get more? N            Health Care Directive:  Patient does not have a Health Care Directive: Discussed advance care planning with patient; however, patient declined at this time.    Rashi Finch      "

## 2024-12-18 ENCOUNTER — OFFICE VISIT (OUTPATIENT)
Dept: FAMILY MEDICINE | Facility: OTHER | Age: 39
End: 2024-12-18
Attending: FAMILY MEDICINE
Payer: COMMERCIAL

## 2024-12-18 VITALS
SYSTOLIC BLOOD PRESSURE: 122 MMHG | WEIGHT: 215 LBS | TEMPERATURE: 99.1 F | HEART RATE: 108 BPM | OXYGEN SATURATION: 96 % | RESPIRATION RATE: 20 BRPM | BODY MASS INDEX: 36.9 KG/M2 | DIASTOLIC BLOOD PRESSURE: 78 MMHG

## 2024-12-18 DIAGNOSIS — M79.10 MYALGIA: Primary | ICD-10-CM

## 2024-12-18 DIAGNOSIS — J10.1 INFLUENZA A: ICD-10-CM

## 2024-12-18 DIAGNOSIS — H60.391 INFECTIVE OTITIS EXTERNA, RIGHT: ICD-10-CM

## 2024-12-18 DIAGNOSIS — J02.9 SORETHROAT: ICD-10-CM

## 2024-12-18 DIAGNOSIS — R05.1 ACUTE COUGH: ICD-10-CM

## 2024-12-18 LAB
FLUAV RNA SPEC QL NAA+PROBE: POSITIVE
FLUBV RNA RESP QL NAA+PROBE: NEGATIVE
RSV RNA SPEC NAA+PROBE: NEGATIVE
S PYO DNA THROAT QL NAA+PROBE: NOT DETECTED
SARS-COV-2 RNA RESP QL NAA+PROBE: NEGATIVE

## 2024-12-18 PROCEDURE — 87637 SARSCOV2&INF A&B&RSV AMP PRB: CPT | Mod: ZL | Performed by: FAMILY MEDICINE

## 2024-12-18 PROCEDURE — 87651 STREP A DNA AMP PROBE: CPT | Mod: ZL | Performed by: FAMILY MEDICINE

## 2024-12-18 RX ORDER — CODEINE PHOSPHATE AND GUAIFENESIN 10; 100 MG/5ML; MG/5ML
1-2 SOLUTION ORAL EVERY 4 HOURS PRN
Qty: 118 ML | Refills: 2 | Status: SHIPPED | OUTPATIENT
Start: 2024-12-18

## 2024-12-18 RX ORDER — CIPROFLOXACIN AND DEXAMETHASONE 3; 1 MG/ML; MG/ML
4 SUSPENSION/ DROPS AURICULAR (OTIC) 2 TIMES DAILY
Qty: 7.5 ML | Refills: 0 | Status: SHIPPED | OUTPATIENT
Start: 2024-12-18

## 2024-12-18 ASSESSMENT — PAIN SCALES - GENERAL: PAINLEVEL_OUTOF10: SEVERE PAIN (7)

## 2024-12-18 NOTE — LETTER
2024    Ilana Aguilar   1985        To Whom it May Concern;    Please excuse Ilana Aguilar from work for a healthcare visit on Dec 18, 2024.    Sincerely,        Wayne Nielsen MD

## 2024-12-18 NOTE — NURSING NOTE
Patient here for flu like symptoms for the past 2 weeks. Today joint pain, headache at the base of head, and ear drainage. Medication Reconciliation: complete.    Mable Stanley LPN  12/18/2024 9:07 AM

## 2024-12-18 NOTE — PROGRESS NOTES
Patient  SUBJECTIVE:   Ilana Aguilar is a 39 year old female who presents to clinic today for the following health issues:  Patient arrives here for 2 weeks history on and off the joint pain headache neck pain.  And she has now developed right ear drainage.  She also has a cough that started yesterday.  She has been using over-the-counter medication without much relief.  Some nasal congestion.  She ranks the muscle aches as a 7 out of 10  HPI          Review of Systems     OBJECTIVE:     /78   Pulse 108   Temp 99.1  F (37.3  C)   Resp 20   Wt 97.5 kg (215 lb)   LMP 12/18/2024   SpO2 96%   BMI 36.90 kg/m    Body mass index is 36.9 kg/m .  Physical Exam    Diagnostic Test Results:  Results for orders placed or performed in visit on 12/18/24 (from the past 24 hours)   Group A Streptococcus PCR Throat Swab    Specimen: Throat; Swab   Result Value Ref Range    Group A strep by PCR Not Detected Not Detected    Narrative    The Xpert Xpress Strep A test, performed on the [employer], is a rapid, qualitative in vitro diagnostic test for the detection of Streptococcus pyogenes (Group A ß-hemolytic Streptococcus, Strep A) in throat swab specimens from patients with signs and symptoms of pharyngitis. The Xpert Xpress Strep A test can be used as an aid in the diagnosis of Group A Streptococcal pharyngitis. The assay is not intended to monitor treatment for Group A Streptococcus infections. The Xpert Xpress Strep A test utilizes an automated real-time polymerase chain reaction (PCR) to detect Streptococcus pyogenes DNA.   Influenza A/B, RSV and SARS-CoV2 PCR (COVID-19) Nose    Specimen: Nose; Swab   Result Value Ref Range    Influenza A PCR Positive (A) Negative    Influenza B PCR Negative Negative    RSV PCR Negative Negative    SARS CoV2 PCR Negative Negative    Narrative    Testing was performed using the Xpert Xpress CoV2/Flu/RSV Assay on the [employer]. This test should be  ordered for the detection of SARS-CoV2, influenza, and RSV viruses in individuals with signs and symptoms of respiratory tract infection. This test is for in vitro diagnostic use under the [place] FDA for laboratories certified under CLIA to perform high or moderate complexity testing. This test has been [place] FDA cleared. A negative result does not rule out the presence of PCR inhibitors in the specimen or target RNA in concentration below the limit of detection for the assay. If only one viral target is positive but coinfection with multiple targets is suspected, the sample should be re-tested with another FDA cleared, approved, or authorized test, if coninfection would change clinical management. This test was validated by the [facility]. These laboratories are certified under the Clinical Laboratory Improvement Amendments of 1988 (CLIA-88) as qualified to perfom high complexity laboratory testing.       ASSESSMENT/PLAN:         (M79.10) Myalgia  (primary encounter diagnosis)  Comment:   Plan: Influenza A/B, RSV and SARS-CoV2 PCR (COVID-19)        Nose, CANCELED: Influenza A/B, RSV and         SARS-CoV2 PCR (COVID-19)            (H60.391) Infective otitis externa, right  Comment:   Plan: ciprofloxacin-dexAMETHasone (CIPRODEX) 0.3-0.1         % otic suspension            (J02.9) Sorethroat  Comment:   Plan: Group A Streptococcus PCR Throat Swab,         CANCELED: Group A Streptococcus PCR Throat Swab            (R05.1) Acute cough  Comment:   Plan: guaiFENesin-codeine (ROBITUSSIN AC) 100-10         MG/5ML solution            (J10.1) Influenza A  Comment: Patient has influenza A and is currently 2 weeks out.  Tamiflu not indicated.  Letter sent with the patient.  Rest.  Follow-up if getting worse.  Plan:       Wayne Nielsen MD  [facility] AND \[facility] [place] [facility]\""

## 2025-08-29 SDOH — HEALTH STABILITY: PHYSICAL HEALTH: ON AVERAGE, HOW MANY MINUTES DO YOU ENGAGE IN EXERCISE AT THIS LEVEL?: 0 MIN

## 2025-08-29 SDOH — HEALTH STABILITY: PHYSICAL HEALTH: ON AVERAGE, HOW MANY DAYS PER WEEK DO YOU ENGAGE IN MODERATE TO STRENUOUS EXERCISE (LIKE A BRISK WALK)?: 0 DAYS

## 2025-09-03 ENCOUNTER — OFFICE VISIT (OUTPATIENT)
Dept: FAMILY MEDICINE | Facility: OTHER | Age: 40
End: 2025-09-03
Attending: PHYSICIAN ASSISTANT

## 2025-09-03 VITALS
HEIGHT: 65 IN | OXYGEN SATURATION: 98 % | TEMPERATURE: 97.1 F | HEART RATE: 77 BPM | DIASTOLIC BLOOD PRESSURE: 84 MMHG | BODY MASS INDEX: 38.89 KG/M2 | WEIGHT: 233.4 LBS | SYSTOLIC BLOOD PRESSURE: 122 MMHG | RESPIRATION RATE: 12 BRPM

## 2025-09-03 DIAGNOSIS — R20.0 NUMBNESS AND TINGLING OF BOTH FEET: ICD-10-CM

## 2025-09-03 DIAGNOSIS — Z13.29 SCREENING FOR THYROID DISORDER: ICD-10-CM

## 2025-09-03 DIAGNOSIS — Z13.1 SCREENING FOR DIABETES MELLITUS: ICD-10-CM

## 2025-09-03 DIAGNOSIS — Z00.00 ROUTINE GENERAL MEDICAL EXAMINATION AT A HEALTH CARE FACILITY: Primary | ICD-10-CM

## 2025-09-03 DIAGNOSIS — E66.812 CLASS 2 OBESITY DUE TO EXCESS CALORIES WITHOUT SERIOUS COMORBIDITY WITH BODY MASS INDEX (BMI) OF 38.0 TO 38.9 IN ADULT: ICD-10-CM

## 2025-09-03 DIAGNOSIS — Z13.220 SCREENING CHOLESTEROL LEVEL: ICD-10-CM

## 2025-09-03 DIAGNOSIS — Z11.3 SCREEN FOR STD (SEXUALLY TRANSMITTED DISEASE): ICD-10-CM

## 2025-09-03 DIAGNOSIS — R20.2 NUMBNESS AND TINGLING OF BOTH FEET: ICD-10-CM

## 2025-09-03 DIAGNOSIS — E66.09 CLASS 2 OBESITY DUE TO EXCESS CALORIES WITHOUT SERIOUS COMORBIDITY WITH BODY MASS INDEX (BMI) OF 38.0 TO 38.9 IN ADULT: ICD-10-CM

## 2025-09-03 LAB
ALBUMIN SERPL BCG-MCNC: 4.2 G/DL (ref 3.5–5.2)
ALP SERPL-CCNC: 74 U/L (ref 40–150)
ALT SERPL W P-5'-P-CCNC: 16 U/L (ref 0–50)
ANION GAP SERPL CALCULATED.3IONS-SCNC: 10 MMOL/L (ref 7–15)
AST SERPL W P-5'-P-CCNC: 18 U/L (ref 0–45)
BASOPHILS # BLD AUTO: 0.05 10E3/UL (ref 0–0.2)
BASOPHILS NFR BLD AUTO: 0.7 %
BILIRUB SERPL-MCNC: 0.3 MG/DL
BUN SERPL-MCNC: 9.5 MG/DL (ref 6–20)
C TRACH DNA SPEC QL PROBE+SIG AMP: NEGATIVE
CALCIUM SERPL-MCNC: 8.9 MG/DL (ref 8.8–10.4)
CHLORIDE SERPL-SCNC: 103 MMOL/L (ref 98–107)
CHOLEST SERPL-MCNC: 166 MG/DL
CREAT SERPL-MCNC: 0.86 MG/DL (ref 0.51–0.95)
EGFRCR SERPLBLD CKD-EPI 2021: 89 ML/MIN/1.73M2
EOSINOPHIL # BLD AUTO: 0.04 10E3/UL (ref 0–0.7)
EOSINOPHIL NFR BLD AUTO: 0.6 %
ERYTHROCYTE [DISTWIDTH] IN BLOOD BY AUTOMATED COUNT: 12 % (ref 10–15)
FASTING STATUS PATIENT QL REPORTED: NO
FASTING STATUS PATIENT QL REPORTED: NO
FERRITIN SERPL-MCNC: 57 NG/ML (ref 6–175)
FOLATE SERPL-MCNC: 12.9 NG/ML (ref 4.6–34.8)
GLUCOSE SERPL-MCNC: 91 MG/DL (ref 70–99)
HCO3 SERPL-SCNC: 26 MMOL/L (ref 22–29)
HCT VFR BLD AUTO: 40.8 % (ref 35–47)
HDLC SERPL-MCNC: 44 MG/DL
HGB BLD-MCNC: 13.4 G/DL (ref 11.7–15.7)
IMM GRANULOCYTES # BLD: <0.03 10E3/UL
IMM GRANULOCYTES NFR BLD: 0.3 %
IRON BINDING CAPACITY (ROCHE): 326 UG/DL (ref 240–430)
IRON SATN MFR SERPL: 21 % (ref 15–46)
IRON SERPL-MCNC: 67 UG/DL (ref 37–145)
LDLC SERPL CALC-MCNC: 102 MG/DL
LYMPHOCYTES # BLD AUTO: 1.76 10E3/UL (ref 0.8–5.3)
LYMPHOCYTES NFR BLD AUTO: 25 %
MCH RBC QN AUTO: 28.6 PG (ref 26.5–33)
MCHC RBC AUTO-ENTMCNC: 32.8 G/DL (ref 31.5–36.5)
MCV RBC AUTO: 87 FL (ref 78–100)
MONOCYTES # BLD AUTO: 0.5 10E3/UL (ref 0–1.3)
MONOCYTES NFR BLD AUTO: 7.1 %
N GONORRHOEA DNA SPEC QL NAA+PROBE: NEGATIVE
NEUTROPHILS # BLD AUTO: 4.67 10E3/UL (ref 1.6–8.3)
NEUTROPHILS NFR BLD AUTO: 66.3 %
NONHDLC SERPL-MCNC: 122 MG/DL
NRBC # BLD AUTO: <0.03 10E3/UL
NRBC BLD AUTO-RTO: 0 /100
PLATELET # BLD AUTO: 344 10E3/UL (ref 150–450)
POTASSIUM SERPL-SCNC: 3.9 MMOL/L (ref 3.4–5.3)
PROT SERPL-MCNC: 7.6 G/DL (ref 6.4–8.3)
RBC # BLD AUTO: 4.69 10E6/UL (ref 3.8–5.2)
SODIUM SERPL-SCNC: 139 MMOL/L (ref 135–145)
SPECIMEN TYPE: NORMAL
TRIGL SERPL-MCNC: 101 MG/DL
TSH SERPL DL<=0.005 MIU/L-ACNC: 1.93 UIU/ML (ref 0.3–4.2)
VIT B12 SERPL-MCNC: 265 PG/ML (ref 232–1245)
VIT D+METAB SERPL-MCNC: 29 NG/ML (ref 20–50)
WBC # BLD AUTO: 7.04 10E3/UL (ref 4–11)

## 2025-09-03 PROCEDURE — 82746 ASSAY OF FOLIC ACID SERUM: CPT | Mod: ZL | Performed by: PHYSICIAN ASSISTANT

## 2025-09-03 PROCEDURE — 85014 HEMATOCRIT: CPT | Mod: ZL | Performed by: PHYSICIAN ASSISTANT

## 2025-09-03 PROCEDURE — 82306 VITAMIN D 25 HYDROXY: CPT | Mod: ZL | Performed by: PHYSICIAN ASSISTANT

## 2025-09-03 PROCEDURE — 87591 N.GONORRHOEAE DNA AMP PROB: CPT | Mod: ZL | Performed by: PHYSICIAN ASSISTANT

## 2025-09-03 PROCEDURE — 80053 COMPREHEN METABOLIC PANEL: CPT | Mod: ZL | Performed by: PHYSICIAN ASSISTANT

## 2025-09-03 PROCEDURE — 83550 IRON BINDING TEST: CPT | Mod: ZL | Performed by: PHYSICIAN ASSISTANT

## 2025-09-03 PROCEDURE — 80061 LIPID PANEL: CPT | Mod: ZL | Performed by: PHYSICIAN ASSISTANT

## 2025-09-03 PROCEDURE — 84443 ASSAY THYROID STIM HORMONE: CPT | Mod: ZL | Performed by: PHYSICIAN ASSISTANT

## 2025-09-03 PROCEDURE — 82607 VITAMIN B-12: CPT | Mod: ZL | Performed by: PHYSICIAN ASSISTANT

## 2025-09-03 PROCEDURE — 82728 ASSAY OF FERRITIN: CPT | Mod: ZL | Performed by: PHYSICIAN ASSISTANT

## 2025-09-03 PROCEDURE — 36415 COLL VENOUS BLD VENIPUNCTURE: CPT | Mod: ZL | Performed by: PHYSICIAN ASSISTANT

## 2025-09-03 ASSESSMENT — PATIENT HEALTH QUESTIONNAIRE - PHQ9
SUM OF ALL RESPONSES TO PHQ QUESTIONS 1-9: 5
SUM OF ALL RESPONSES TO PHQ QUESTIONS 1-9: 5
10. IF YOU CHECKED OFF ANY PROBLEMS, HOW DIFFICULT HAVE THESE PROBLEMS MADE IT FOR YOU TO DO YOUR WORK, TAKE CARE OF THINGS AT HOME, OR GET ALONG WITH OTHER PEOPLE: SOMEWHAT DIFFICULT

## 2025-09-03 ASSESSMENT — PAIN SCALES - GENERAL: PAINLEVEL_OUTOF10: NO PAIN (0)

## (undated) RX ORDER — LORAZEPAM 1 MG/1
TABLET ORAL
Status: DISPENSED
Start: 2022-08-16

## (undated) RX ORDER — CEFTRIAXONE SODIUM 2 G/50ML
INJECTION, SOLUTION INTRAVENOUS
Status: DISPENSED
Start: 2022-08-16

## (undated) RX ORDER — DEXAMETHASONE SODIUM PHOSPHATE 4 MG/ML
INJECTION, SOLUTION INTRA-ARTICULAR; INTRALESIONAL; INTRAMUSCULAR; INTRAVENOUS; SOFT TISSUE
Status: DISPENSED
Start: 2024-06-10

## (undated) RX ORDER — DEXAMETHASONE SODIUM PHOSPHATE 4 MG/ML
INJECTION, SOLUTION INTRA-ARTICULAR; INTRALESIONAL; INTRAMUSCULAR; INTRAVENOUS; SOFT TISSUE
Status: DISPENSED
Start: 2024-06-02

## (undated) RX ORDER — ACYCLOVIR SODIUM 500 MG/10ML
INJECTION, SOLUTION INTRAVENOUS
Status: DISPENSED
Start: 2022-08-16

## (undated) RX ORDER — ACETAMINOPHEN 500 MG
TABLET ORAL
Status: DISPENSED
Start: 2024-06-02

## (undated) RX ORDER — OLANZAPINE 2.5 MG/1
TABLET, FILM COATED ORAL
Status: DISPENSED
Start: 2022-08-16

## (undated) RX ORDER — NALOXONE HYDROCHLORIDE 0.4 MG/ML
INJECTION, SOLUTION INTRAMUSCULAR; INTRAVENOUS; SUBCUTANEOUS
Status: DISPENSED
Start: 2022-08-15